# Patient Record
Sex: MALE | Race: WHITE | NOT HISPANIC OR LATINO | Employment: OTHER | URBAN - METROPOLITAN AREA
[De-identification: names, ages, dates, MRNs, and addresses within clinical notes are randomized per-mention and may not be internally consistent; named-entity substitution may affect disease eponyms.]

---

## 2017-04-28 ENCOUNTER — OFFICE VISIT (OUTPATIENT)
Dept: CARDIOLOGY | Facility: CLINIC | Age: 67
End: 2017-04-28

## 2017-04-28 ENCOUNTER — HOSPITAL ENCOUNTER (OUTPATIENT)
Dept: CARDIOLOGY | Facility: HOSPITAL | Age: 67
Discharge: HOME | End: 2017-04-28
Attending: INTERNAL MEDICINE | Admitting: INTERNAL MEDICINE

## 2017-04-28 VITALS
HEIGHT: 74 IN | DIASTOLIC BLOOD PRESSURE: 70 MMHG | BODY MASS INDEX: 24.38 KG/M2 | SYSTOLIC BLOOD PRESSURE: 128 MMHG | WEIGHT: 190 LBS | HEART RATE: 64 BPM

## 2017-04-28 DIAGNOSIS — R09.89 CAROTID BRUIT, UNSPECIFIED LATERALITY: Primary | ICD-10-CM

## 2017-04-28 DIAGNOSIS — E78.2 MIXED HYPERLIPIDEMIA: ICD-10-CM

## 2017-04-28 DIAGNOSIS — I10 ESSENTIAL HYPERTENSION: ICD-10-CM

## 2017-04-28 DIAGNOSIS — E11.65 TYPE 2 DIABETES MELLITUS WITH HYPERGLYCEMIA, WITHOUT LONG-TERM CURRENT USE OF INSULIN (HCC): ICD-10-CM

## 2017-04-28 DIAGNOSIS — I25.10 CORONARY ARTERY DISEASE INVOLVING NATIVE CORONARY ARTERY OF NATIVE HEART WITHOUT ANGINA PECTORIS: ICD-10-CM

## 2017-04-28 LAB
BH CV XLRA MEAS LEFT CCA RATIO VEL: 95.1 CM/SEC
BH CV XLRA MEAS LEFT DIST CCA EDV: 16.5 CM/SEC
BH CV XLRA MEAS LEFT DIST CCA PSV: 95.9 CM/SEC
BH CV XLRA MEAS LEFT DIST ICA EDV: 28.3 CM/SEC
BH CV XLRA MEAS LEFT DIST ICA PSV: 102.9 CM/SEC
BH CV XLRA MEAS LEFT ICA RATIO VEL: 95.1 CM/SEC
BH CV XLRA MEAS LEFT ICA/CCA RATIO: 1
BH CV XLRA MEAS LEFT MID ICA EDV: 27.5 CM/SEC
BH CV XLRA MEAS LEFT MID ICA PSV: 95.9 CM/SEC
BH CV XLRA MEAS LEFT PROX CCA EDV: 17.3 CM/SEC
BH CV XLRA MEAS LEFT PROX CCA PSV: 84.9 CM/SEC
BH CV XLRA MEAS LEFT PROX ECA PSV: 140.6 CM/SEC
BH CV XLRA MEAS LEFT PROX ICA EDV: 23.6 CM/SEC
BH CV XLRA MEAS LEFT PROX ICA PSV: 88 CM/SEC
BH CV XLRA MEAS LEFT PROX SCLA PSV: 108.4 CM/SEC
BH CV XLRA MEAS LEFT VERTEBRAL A EDV: 11.3 CM/SEC
BH CV XLRA MEAS LEFT VERTEBRAL A PSV: 58.9 CM/SEC
BH CV XLRA MEAS RIGHT CCA RATIO VEL: 73.3 CM/SEC
BH CV XLRA MEAS RIGHT DIST CCA EDV: 15.4 CM/SEC
BH CV XLRA MEAS RIGHT DIST CCA PSV: 73.9 CM/SEC
BH CV XLRA MEAS RIGHT DIST ICA EDV: 29.5 CM/SEC
BH CV XLRA MEAS RIGHT DIST ICA PSV: 103.1 CM/SEC
BH CV XLRA MEAS RIGHT ICA RATIO VEL: 104 CM/SEC
BH CV XLRA MEAS RIGHT ICA/CCA RATIO: 1.4
BH CV XLRA MEAS RIGHT MID ICA EDV: 28.3 CM/SEC
BH CV XLRA MEAS RIGHT MID ICA PSV: 105 CM/SEC
BH CV XLRA MEAS RIGHT PROX CCA EDV: 17.1 CM/SEC
BH CV XLRA MEAS RIGHT PROX CCA PSV: 84.4 CM/SEC
BH CV XLRA MEAS RIGHT PROX ECA PSV: 129.2 CM/SEC
BH CV XLRA MEAS RIGHT PROX ICA EDV: 18.9 CM/SEC
BH CV XLRA MEAS RIGHT PROX ICA PSV: 57.8 CM/SEC
BH CV XLRA MEAS RIGHT PROX SCLA PSV: 121 CM/SEC
BH CV XLRA MEAS RIGHT VERTEBRAL A EDV: 12.7 CM/SEC
BH CV XLRA MEAS RIGHT VERTEBRAL A PSV: 41 CM/SEC

## 2017-04-28 PROCEDURE — 93880 EXTRACRANIAL BILAT STUDY: CPT

## 2017-04-28 PROCEDURE — 99214 OFFICE O/P EST MOD 30 MIN: CPT | Performed by: INTERNAL MEDICINE

## 2017-04-28 PROCEDURE — 93880 EXTRACRANIAL BILAT STUDY: CPT | Performed by: INTERNAL MEDICINE

## 2017-04-28 NOTE — PROGRESS NOTES
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    Chief Complaint   Patient presents with   • Coronary Artery Disease   • Right bundle branch block   • Hypertension       PROBLEM LIST:   1.  Coronary artery disease:  a. Surgical revascularization,  Dr. Bryant, 07/01, [facility], receiving LIMA to LAD, free radial graft to PDA, saphenous graft to first diagonal, saphenous graft to obtuse marginal.  b. MPS, May 2008, per [facility]: Normal perfusion, EF 68%.   c. April 2012 - exercise Cardiolite  WNL, EF preserved.  2. Diabetes mellitus, on Metformin  times one year, onset 2009:  March 2015.  HG A1c 8.6.   3.  Dyslipidemia:  a. March 2015:  Total cholesterol 143, triglycerides  326, HDL 39, LDL 38.  4. Hypertension, presumed essential.   5.  Baseline right bundle-branch block.     Allergies  Allergies   Allergen Reactions   • Cardizem [Diltiazem Hcl]    • Celebrex [Celecoxib]        Current Medications    Current Outpatient Prescriptions:   •  aspirin 81 MG tablet, Take 81 mg by mouth Daily., Disp: , Rfl:   •  atorvastatin (LIPITOR) 20 MG tablet, Take 20 mg by mouth Daily., Disp: , Rfl:   •  glipiZIDE (GLUCOTROL) 5 MG tablet, Take 5 mg by mouth Daily., Disp: , Rfl:   •  metFORMIN (GLUCOPHAGE) 1000 MG tablet, Take 1,000 mg by mouth 2 (Two) Times a Day With Meals., Disp: , Rfl:   •  metoprolol succinate XL (TOPROL-XL) 50 MG 24 hr tablet, Take  by mouth Daily., Disp: , Rfl:   •  nitroglycerin (NITROSTAT) 0.4 MG SL tablet, Place 0.4 mg under the tongue Every 5 (Five) Minutes As Needed for chest pain. Take no more than 3 doses in 15 minutes., Disp: , Rfl:       History of Present Illness   Patient presents in routine follow-up. L shoulder and  "arm pain intermittently.  No leg or facial issue.  No chest pain or recurrent leg swelling .  Has not been compliant w diabetic diet after loss of his wife.  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    ROS:  All systems have been reviewed and are negative with the exception of those mentioned in the HPI.    OBJECTIVE:  Vitals:    04/28/17 1531   BP: 128/70   BP Location: Right arm   Patient Position: Sitting   Pulse: 64   Weight: 190 lb (86.2 kg)   Height: 74\" (188 cm)     Physical Exam   Constitutional: He appears well-developed and well-nourished.   Neck: Normal range of motion. Neck supple. No hepatojugular reflux and no JVD present. Carotid bruit is not present. No tracheal deviation present. No thyromegaly present.   Cardiovascular: Normal rate, regular rhythm, S1 normal, S2 normal, intact distal pulses and normal pulses.  PMI is not displaced.  Exam reveals no gallop, no distant heart sounds, no friction rub, no midsystolic click and no opening snap.    No murmur heard.  Pulses:       Carotid pulses are on the right side with bruit, and on the left side with bruit.       Radial pulses are 2+ on the right side, and 2+ on the left side.        Dorsalis pedis pulses are 2+ on the right side, and 2+ on the left side.        Posterior tibial pulses are 2+ on the right side, and 2+ on the left side.   Pulmonary/Chest: Effort normal and breath sounds normal. He has no wheezes. He has no rales.   Abdominal: Soft. Bowel sounds are normal. He exhibits no mass. There is no tenderness. There is no guarding.       Diagnostic Data:  Procedures      ASSESSMENT:   Diagnosis Plan   1. Carotid bruit, unspecified laterality  Duplex Carotid Ultrasound CAR   2. Coronary artery disease involving native coronary artery of native heart without angina pectoris     3. Essential hypertension     4. Mixed hyperlipidemia     5. Type 2 diabetes mellitus with hyperglycemia, without long-term current use of insulin   "       PLAN:  1.  No current anginal equivalent with last stress test 2012.  Continue current medical management and risk reduction.  2. htn not Elevated in office today with reported elevation at his PCPs office.  He does not check his blood pressures at home.  He is obtaining home monitor to see what his pressures at home or.  With his diabetes and coronary artery disease would recommend initiation of ACE inhibitor for cardiorenal protection unless otherwise contraindicated.  Pt thinks victoza is only rx that has helped him historically  But [facility].  3. HL  Reportedly well controlled on current statin therapy.  Recommend continued routine lipid check through his PCP.  4. L arm numbness - carotid bruit, will check cus

## 2017-11-10 ENCOUNTER — OFFICE VISIT (OUTPATIENT)
Dept: CARDIOLOGY | Facility: CLINIC | Age: 67
End: 2017-11-10

## 2017-11-10 VITALS
BODY MASS INDEX: 22.77 KG/M2 | OXYGEN SATURATION: 96 % | DIASTOLIC BLOOD PRESSURE: 60 MMHG | HEIGHT: 74 IN | HEART RATE: 92 BPM | WEIGHT: 177.4 LBS | SYSTOLIC BLOOD PRESSURE: 100 MMHG

## 2017-11-10 DIAGNOSIS — I10 ESSENTIAL HYPERTENSION: ICD-10-CM

## 2017-11-10 DIAGNOSIS — E78.2 MIXED HYPERLIPIDEMIA: ICD-10-CM

## 2017-11-10 DIAGNOSIS — I25.10 CORONARY ARTERY DISEASE INVOLVING NATIVE CORONARY ARTERY OF NATIVE HEART WITHOUT ANGINA PECTORIS: Primary | ICD-10-CM

## 2017-11-10 DIAGNOSIS — E11.9 TYPE 2 DIABETES MELLITUS WITHOUT COMPLICATION, WITHOUT LONG-TERM CURRENT USE OF INSULIN (HCC): ICD-10-CM

## 2017-11-10 PROCEDURE — 99214 OFFICE O/P EST MOD 30 MIN: CPT | Performed by: INTERNAL MEDICINE

## 2017-11-10 NOTE — PROGRESS NOTES
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    Chief Complaint   Patient presents with   • Coronary Artery Disease       PROBLEM LIST:   1.  Coronary artery disease:  a. Surgical revascularization,  Dr. Bryant, 07/01, [facility], receiving LIMA to LAD, free radial graft to PDA, saphenous graft to first diagonal, saphenous graft to obtuse marginal.  b. MPS, May 2008, per [facility]: Normal perfusion, EF 68%.   c. April 2012 - exercise Cardiolite  WNL, EF preserved.  2. Diabetes mellitus, on Metformin  times one year, onset 2009:  March 2015.  HG A1c 8.6.   3.  Dyslipidemia:  a. March 2015:  Total cholesterol 143, triglycerides  326, HDL 39, LDL 38.  4. Hypertension, presumed essential.   5. CVD - 4/17 CUS wnl  6.  Baseline right bundle-branch block.     Allergies  Allergies   Allergen Reactions   • Cardizem [Diltiazem Hcl]    • Celebrex [Celecoxib]        Current Medications    Current Outpatient Prescriptions:   •  aspirin 81 MG tablet, Take 81 mg by mouth Daily., Disp: , Rfl:   •  atorvastatin (LIPITOR) 20 MG tablet, Take 20 mg by mouth Daily., Disp: , Rfl:   •  glipiZIDE (GLUCOTROL) 5 MG tablet, Take 5 mg by mouth Daily., Disp: , Rfl:   •  metFORMIN (GLUCOPHAGE) 1000 MG tablet, Take 1,000 mg by mouth 2 (Two) Times a Day With Meals., Disp: , Rfl:   •  metoprolol succinate XL (TOPROL-XL) 50 MG 24 hr tablet, Take  by mouth 2 (Two) Times a Day., Disp: , Rfl:   •  nitroglycerin (NITROSTAT) 0.4 MG SL tablet, Place 0.4 mg under the tongue Every 5 (Five) Minutes As Needed for chest pain. Take no more than 3 doses in 15 minutes., Disp: , Rfl:       History of Present Illness   Patient presents in routine follow-up. L shoulder and arm pain  "intermittently.  No leg or facial issue.  No chest pain or recurrent leg swelling . Walking regularly up to 3 miles daily with his new female friend.  He states his blood sugars have been much better controlled and he is asymptomatic    ROS:  All systems have been reviewed and are negative with the exception of those mentioned in the HPI.    OBJECTIVE:  Vitals:    11/10/17 1402   BP: 100/60   BP Location: Right arm   Patient Position: Sitting   Pulse: 92   SpO2: 96%   Weight: 177 lb 6.4 oz (80.5 kg)   Height: 74\" (188 cm)     Physical Exam   Constitutional: He appears well-developed and well-nourished.   Neck: Normal range of motion. Neck supple. No hepatojugular reflux and no JVD present. Carotid bruit is not present. No tracheal deviation present. No thyromegaly present.   Cardiovascular: Normal rate, regular rhythm, S1 normal, S2 normal, intact distal pulses and normal pulses.  PMI is not displaced.  Exam reveals no gallop, no distant heart sounds, no friction rub, no midsystolic click and no opening snap.    No murmur heard.  Pulses:       Carotid pulses are on the right side with bruit, and on the left side with bruit.       Radial pulses are 2+ on the right side, and 2+ on the left side.        Dorsalis pedis pulses are 2+ on the right side, and 2+ on the left side.        Posterior tibial pulses are 2+ on the right side, and 2+ on the left side.   Pulmonary/Chest: Effort normal and breath sounds normal. He has no wheezes. He has no rales.   Abdominal: Soft. Bowel sounds are normal. He exhibits no mass. There is no tenderness. There is no guarding.       Diagnostic Data:  Procedures      ASSESSMENT:   Diagnosis Plan   1. Coronary artery disease involving native coronary artery of native heart without angina pectoris     2. Essential hypertension     3. Mixed hyperlipidemia     4. Type 2 diabetes mellitus without complication, without long-term current use of insulin         PLAN:  1.  No current anginal " equivalent with last stress test 2012.  Continue current medical management and risk reduction.  2. htn not Elevated in office today with reported elevation at his PCPs office.  He does not check his blood pressures at home.  He is obtaining home monitor to see what his pressures at home or.  With his diabetes and coronary artery disease would recommend initiation of ACE inhibitor for cardiorenal protection unless otherwise contraindicated.  Pt thinks victoza is only rx that has helped him historically  But [facility].  3. HL  Reportedly well controlled on current statin therapy.  Recommend continued routine lipid check through his PCP.  4. L arm numbness - carotid bruit, will check cus

## 2018-04-10 ENCOUNTER — HOSPITAL ENCOUNTER (OUTPATIENT)
Dept: GENERAL RADIOLOGY | Facility: HOSPITAL | Age: 68
Discharge: HOME | End: 2018-04-10
Admitting: FAMILY MEDICINE

## 2018-04-10 DIAGNOSIS — M25.551 RIGHT HIP PAIN: ICD-10-CM

## 2018-04-10 PROCEDURE — 73502 X-RAY EXAM HIP UNI 2-3 VIEWS: CPT

## 2018-04-10 PROCEDURE — 73502 X-RAY EXAM HIP UNI 2-3 VIEWS: CPT | Performed by: RADIOLOGY

## 2018-08-17 ENCOUNTER — OFFICE VISIT (OUTPATIENT)
Dept: CARDIOLOGY | Facility: CLINIC | Age: 68
End: 2018-08-17

## 2018-08-17 VITALS
DIASTOLIC BLOOD PRESSURE: 62 MMHG | WEIGHT: 185.2 LBS | SYSTOLIC BLOOD PRESSURE: 128 MMHG | HEIGHT: 74 IN | HEART RATE: 55 BPM | BODY MASS INDEX: 23.77 KG/M2

## 2018-08-17 DIAGNOSIS — I10 ESSENTIAL HYPERTENSION: ICD-10-CM

## 2018-08-17 DIAGNOSIS — E78.2 MIXED HYPERLIPIDEMIA: ICD-10-CM

## 2018-08-17 DIAGNOSIS — I25.10 CORONARY ARTERY DISEASE INVOLVING NATIVE CORONARY ARTERY OF NATIVE HEART WITHOUT ANGINA PECTORIS: Primary | ICD-10-CM

## 2018-08-17 PROCEDURE — 93000 ELECTROCARDIOGRAM COMPLETE: CPT | Performed by: INTERNAL MEDICINE

## 2018-08-17 PROCEDURE — 99213 OFFICE O/P EST LOW 20 MIN: CPT | Performed by: INTERNAL MEDICINE

## 2018-08-17 NOTE — PROGRESS NOTES
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    Chief Complaint   Patient presents with   • Coronary Artery Disease       PROBLEM LIST:   1.  Coronary artery disease:  a. Surgical revascularization,  Dr. Bryant, 07/01, [facility], receiving LIMA to LAD, free radial graft to PDA, saphenous graft to first diagonal, saphenous graft to obtuse marginal.  b. MPS, May 2008, per [facility]: Normal perfusion, EF 68%.   c. April 2012 - exercise Cardiolite  WNL, EF preserved.  2. Diabetes mellitus, on Metformin  times one year, onset 2009:  March 2015.  HG A1c 8.6.   3.  Dyslipidemia:  a. March 2015:  Total cholesterol 143, triglycerides  326, HDL 39, LDL 38.  4. Hypertension, presumed essential.   5. CVD - 4/17 CUS wnl  6.  Baseline right bundle-branch block.     Allergies  Allergies   Allergen Reactions   • Cardizem [Diltiazem Hcl]    • Celebrex [Celecoxib]        Current Medications    Current Outpatient Prescriptions:   •  aspirin 81 MG tablet, Take 81 mg by mouth Daily., Disp: , Rfl:   •  atorvastatin (LIPITOR) 20 MG tablet, Take 20 mg by mouth Daily., Disp: , Rfl:   •  glipiZIDE (GLUCOTROL) 5 MG tablet, Take 5 mg by mouth Daily., Disp: , Rfl:   •  metFORMIN (GLUCOPHAGE) 1000 MG tablet, Take 1,000 mg by mouth 2 (Two) Times a Day With Meals., Disp: , Rfl:   •  metoprolol succinate XL (TOPROL-XL) 50 MG 24 hr tablet, Take  by mouth 2 (Two) Times a Day., Disp: , Rfl:   •  nitroglycerin (NITROSTAT) 0.4 MG SL tablet, Place 0.4 mg under the tongue Every 5 (Five) Minutes As Needed for chest pain. Take no more than 3 doses in 15 minutes., Disp: , Rfl:       History of Present Illness   Patient presents in routine follow-up.No chest pain or  "recurrent leg swelling . Walking regularly up to 2 miles daily.  He states his blood sugars have been much better controlled and he is asymptomatic.  Caring for mother w cancer 87 yo may not go to [place] this winter.    ROS:  All systems have been reviewed and are negative with the exception of those mentioned in the HPI.    OBJECTIVE:  Vitals:    08/17/18 1349   BP: 128/62   BP Location: Left arm   Patient Position: Sitting   Pulse: 55   Weight: 84 kg (185 lb 3.2 oz)   Height: 188 cm (74\")     Physical Exam   Constitutional: He appears well-developed and well-nourished.   Neck: Normal range of motion. Neck supple. No hepatojugular reflux and no JVD present. Carotid bruit is not present. No tracheal deviation present. No thyromegaly present.   Cardiovascular: Normal rate, regular rhythm, S1 normal, S2 normal, intact distal pulses and normal pulses.  PMI is not displaced.  Exam reveals no gallop, no distant heart sounds, no friction rub, no midsystolic click and no opening snap.    No murmur heard.  Pulses:       Carotid pulses are on the right side with bruit, and on the left side with bruit.       Radial pulses are 2+ on the right side, and 2+ on the left side.        Dorsalis pedis pulses are 2+ on the right side, and 2+ on the left side.        Posterior tibial pulses are 2+ on the right side, and 2+ on the left side.   Pulmonary/Chest: Effort normal and breath sounds normal. He has no wheezes. He has no rales.   Abdominal: Soft. Bowel sounds are normal. He exhibits no mass. There is no tenderness. There is no guarding.       Diagnostic Data:    ECG 12 Lead  Date/Time: 8/17/2018 1:18 PM  Performed by: SYDNEY LIRIANO  Authorized by: SYDNEY LIRIANO   Comparison: not compared with previous ECG   Previous ECG: no previous ECG available  Rhythm: sinus rhythm  BPM: 86  Conduction: right bundle branch block  Clinical impression: abnormal ECG  Comments: TWA              ASSESSMENT:   Diagnosis Plan   1. Coronary " artery disease involving native coronary artery of native heart without angina pectoris     2. Essential hypertension     3. Mixed hyperlipidemia         PLAN:  1.  No current anginal equivalent with last stress test 2012.  Continue current medical management and risk reduction.  2. htn not Elevated in office today with reported elevation at his PCPs office.  He does not check his blood pressures at home.  He is obtaining home monitor to see what his pressures at home or.  With his diabetes and coronary artery disease would recommend initiation of ACE inhibitor for cardiorenal protection unless otherwise contraindicated.   3. HL  Reportedly well controlled on current statin therapy.  Recommend continued routine lipid check through his PCP.

## 2018-10-01 ENCOUNTER — OFFICE VISIT (OUTPATIENT)
Dept: UROLOGY | Facility: CLINIC | Age: 68
End: 2018-10-01

## 2018-10-01 VITALS — WEIGHT: 185 LBS | HEIGHT: 74 IN | BODY MASS INDEX: 23.74 KG/M2

## 2018-10-01 DIAGNOSIS — N40.1 BPH ASSOCIATED WITH NOCTURIA: ICD-10-CM

## 2018-10-01 DIAGNOSIS — R53.83 FATIGUE, UNSPECIFIED TYPE: Primary | ICD-10-CM

## 2018-10-01 DIAGNOSIS — R35.1 BPH ASSOCIATED WITH NOCTURIA: ICD-10-CM

## 2018-10-01 DIAGNOSIS — N48.6 PEYRONIE DISEASE: ICD-10-CM

## 2018-10-01 DIAGNOSIS — R79.89 LOW TESTOSTERONE IN MALE: ICD-10-CM

## 2018-10-01 LAB — TESTOST SERPL-MCNC: 453.78 NG/DL (ref 86.98–780.1)

## 2018-10-01 PROCEDURE — 36415 COLL VENOUS BLD VENIPUNCTURE: CPT | Performed by: UROLOGY

## 2018-10-01 PROCEDURE — 99204 OFFICE O/P NEW MOD 45 MIN: CPT | Performed by: UROLOGY

## 2018-10-01 PROCEDURE — 84403 ASSAY OF TOTAL TESTOSTERONE: CPT | Performed by: UROLOGY

## 2018-10-01 RX ORDER — TAMSULOSIN HYDROCHLORIDE 0.4 MG/1
1 CAPSULE ORAL NIGHTLY
Qty: 30 CAPSULE | Refills: 3 | Status: ON HOLD | OUTPATIENT
Start: 2018-10-01 | End: 2022-08-26

## 2018-10-01 NOTE — PROGRESS NOTES
Chief Complaint:          Chief Complaint   Patient presents with   • Erectile Dysfunction       HPI:   67 y.o. male.  67-year-old white male referred for evaluation of erectile dysfunction.  He took Viagra get a severe headache.  He didn't think Cialis helped.  He said his PSA is low.  He's never had a testosterone level.  He has significant dribbling after micturition he is a diabetic but is not sure how well it is controlled. he quit tobacco in 2001 when he had his bypass he's got extensive Peyronie's disease on palpation of his penis; place him on alpha blockade and check a testosterone level as well as recheck him back in 4 weeks    Past Medical History:        Past Medical History:   Diagnosis Date   • Coronary artery disease 10/17/2016    Surgical revascularization, Dr. Bryant, 07/01, [facility], receiving LIMA to LAD, free radial graft to PDA, saphenous graft to first diagonal, saphenous graft to obtuse marginal. MPS, May 2008, per [facility]: Normal perfusion, EF 68%.  April 2012 - exercise Cardiolite WNL, EF preserved.   • Diabetes mellitus (CMS/HCC) 10/17/2016    on Metformin times one year, onset 2009:  March 2015.  HG A1c 8.6.    • Dyslipidemia 10/17/2016    March 2015:  Total cholesterol 143, triglycerides 326, HDL 39, LDL 38.   • Hypertension 10/17/2016    presumed essential.    • Right bundle branch block 10/17/2016    baseline         Current Meds:     Current Outpatient Prescriptions   Medication Sig Dispense Refill   • aspirin 81 MG tablet Take 81 mg by mouth Daily.     • atorvastatin (LIPITOR) 20 MG tablet Take 20 mg by mouth Daily.     • glipiZIDE (GLUCOTROL) 5 MG tablet Take 5 mg by mouth Daily.     • metFORMIN (GLUCOPHAGE) 1000 MG tablet Take 1,000 mg by mouth 2 (Two) Times a Day With Meals.     • metoprolol succinate XL (TOPROL-XL) 50 MG 24 hr tablet Take  by mouth 2 (Two) Times a Day.     • nitroglycerin (NITROSTAT) 0.4 MG SL tablet Place 0.4 mg under the tongue Every 5 (Five) Minutes As Needed for  chest pain. Take no more than 3 doses in 15 minutes.       No current facility-administered medications for this visit.         Allergies:      Allergies   Allergen Reactions   • Cardizem [Diltiazem Hcl]    • Celebrex [Celecoxib]         Past Surgical History:     No past surgical history on file.      Social History:     Social History     Social History   • Marital status:      Spouse name: N/A   • Number of children: N/A   • Years of education: N/A     Occupational History   • Not on file.     Social History Main Topics   • Smoking status: Former Smoker     Types: Cigarettes     Quit date: 10/28/2001   • Smokeless tobacco: Never Used   • Alcohol use No   • Drug use: No   • Sexual activity: Defer     Other Topics Concern   • Not on file     Social History Narrative   • No narrative on file       Family History:     Family History   Problem Relation Age of Onset   • Ovarian cancer Mother    • Hypertension Father    • Stroke Father        Review of Systems:     Review of Systems   Constitutional: Negative.    HENT: Negative.    Eyes: Negative.    Respiratory: Negative.    Cardiovascular: Negative.    Gastrointestinal: Negative.    Endocrine: Negative.    Genitourinary: Positive for penile pain.   Musculoskeletal: Negative.    Allergic/Immunologic: Negative.    Neurological: Negative.    Hematological: Negative.    Psychiatric/Behavioral: Negative.        Physical Exam:     Physical Exam   Constitutional: He is oriented to person, place, and time. He appears well-developed and well-nourished.   HENT:   Head: Normocephalic and atraumatic.   Eyes: Pupils are equal, round, and reactive to light. Conjunctivae and EOM are normal.   Neck: Normal range of motion.   Cardiovascular: Normal rate, regular rhythm, normal heart sounds and intact distal pulses.    Pulmonary/Chest: Effort normal and breath sounds normal.   Abdominal: Soft. Bowel sounds are normal.   Genitourinary:   Genitourinary Comments: Soft nontender  abdomen with no organomegaly, rigidity, or tenderness.  He has normal external genitalia and uncircumcised phallus with a freely movable foreskin.  He has extensive plaque in the shaft. bilaterally descended testes without masses there is no inguinal hernias adenopathy or abnormalities he had good rectal tone and a large smooth firm prostate.  There is no nodularity or any suspicious rectal abnormalities     Musculoskeletal: Normal range of motion.   Neurological: He is alert and oriented to person, place, and time. He has normal reflexes.   Skin: Skin is warm and dry.   Psychiatric: He has a normal mood and affect. His behavior is normal. Judgment and thought content normal.   Nursing note and vitals reviewed.      I have reviewed the following portions of the patient's history: allergies, current medications, past family history, past medical history, past social history, past surgical history, problem list and ROS and confirm it's accurate.      Procedure:       Assessment/Plan:   Peyronie's Disease-we discussed the pathophysiology of this at length.  I went ahead and anand a diagram showing the disease status and how it has about an 18% association with a congenital condition called Dupuytren's contracture which is very much an inherited disease but is seen in men at the average age in the 50s and is associated many times with diabetes.  However the vast majority of this disease is related to penile trauma especially in the mid 50s when testosterone levels are dropping and a rockhard erection is not an option causing bending at the flexion point of the penis.  We discussed treatment options including the use of xiaflex, which is clostridial collagenase causing a dissolution of the plaque.  This is especially useful single bend however we also discussed penile traction devices vacuum pumps and the use of low-dose PDE-5 inhibitors have been effective. We also discussed the significant foreshortening of the penis  which is a hallmark of the disease  This is a very difficult situation for him and we will initiate aggressive therapy as his wife is very interested in resuming sexual intercourse.  He also discussed the various antiquated therapies including the use of colchicine,PABA, verapamil, and vitamin E therapy.  Low TestosteroneThis pleasant male patient presents today with signs and symptoms that are consistent with low testosterone he has positive Ramon questionnaire by history this includes both the sexual and nonsexual side effects.  Sexual side effects include inability to achieve and maintain an erection, in ability to maintain his erection and decreased interest and sexual activity.  Nonsexual symptomatology includes fatigue, difficulty completing a job, tiredness.  He has a discussion of the various forms testosterone available including parenteral, topical, and the form of a patch.  We discussed the efficacy of the gels, and the injections.  As well as the cost and benefits analysis.  We discussed the the studies a talked about heart disease and its effect on prostate cancer both of which are negligible.  He gives verbal consent to proceed with treatment.  He understands the risks and benefits of length he also completed his attempts at fertility he understands the partial effect on spermatogenesis     Patient's Body mass index is 23.74 kg/m². BMI is within normal parameters. No follow-up required.          This document has been electronically signed by LIAN SHULTZ MD October 1, 2018 8:55 AM

## 2018-10-17 ENCOUNTER — TELEPHONE (OUTPATIENT)
Dept: UROLOGY | Facility: CLINIC | Age: 68
End: 2018-10-17

## 2018-10-17 NOTE — TELEPHONE ENCOUNTER
I called Dr. Estrada's office per Dr. Espinoza's request to try and get a copy of the patient's PSA results.  I left a message and asked them to please fax it over or call us and left both numbers.

## 2018-11-13 ENCOUNTER — OFFICE VISIT (OUTPATIENT)
Dept: UROLOGY | Facility: CLINIC | Age: 68
End: 2018-11-13

## 2018-11-13 VITALS — BODY MASS INDEX: 23.74 KG/M2 | WEIGHT: 185 LBS | HEIGHT: 74 IN

## 2018-11-13 DIAGNOSIS — N48.6 PEYRONIE DISEASE: Primary | ICD-10-CM

## 2018-11-13 PROBLEM — R79.89 LOW TESTOSTERONE IN MALE: Status: RESOLVED | Noted: 2018-10-01 | Resolved: 2018-11-13

## 2018-11-13 PROCEDURE — 99214 OFFICE O/P EST MOD 30 MIN: CPT | Performed by: UROLOGY

## 2018-11-13 NOTE — PROGRESS NOTES
Chief Complaint:          Chief Complaint   Patient presents with   • Fatigue       HPI:   68 y.o. male.  67-year-old white male referred for evaluation of erectile dysfunction.  He took Viagra get a severe headache.  He didn't think Cialis helped.  He said his PSA is low.  He's never had a testosterone level.  He has significant dribbling after micturition he is a diabetic but is not sure how well it is controlled. he quit tobacco in 2001 when he had his bypass he's got extensive Peyronie's disease on palpation of his penis; place him on alpha blockade and check a testosterone level as well as recheck him back in 4 weeks.  He returns today to review his labs.  His testosterone levels 453.78 which is well within the normal range and is indicative of the fact he will get no response from exogenous testosterone.  With regards to his Peyronie's Disease.  He is unable to penetrate because of distal softening.  Because of this, I don't believe is a candidate for Xiaflex we discussed other options including penile injections versus a penile implant.  He wants to talk it over with his second wife.  I indicated if he has a very active sex life this would be worth it but if it's a situation where she has no interest and would probably be a waste of time and very futile and frustrating for him.  He's going to discuss his situation with his wife and get back with me in this regard.    Past Medical History:        Past Medical History:   Diagnosis Date   • Coronary artery disease 10/17/2016    Surgical revascularization, Dr. Bryant, 07/01, [facility], receiving LIMA to LAD, free radial graft to PDA, saphenous graft to first diagonal, saphenous graft to obtuse marginal. MPS, May 2008, per [facility]: Normal perfusion, EF 68%.  April 2012 - exercise Cardiolite WNL, EF preserved.   • Diabetes mellitus (CMS/[facility]) 10/17/2016    on Metformin times one year, onset 2009:  March 2015.  HG A1c 8.6.    • Dyslipidemia 10/17/2016    March 2015:   Total cholesterol 143, triglycerides 326, HDL 39, LDL 38.   • Hypertension 10/17/2016    presumed essential.    • Right bundle branch block 10/17/2016    baseline         Current Meds:     Current Outpatient Medications   Medication Sig Dispense Refill   • aspirin 81 MG tablet Take 81 mg by mouth Daily.     • atorvastatin (LIPITOR) 20 MG tablet Take 20 mg by mouth Daily.     • glipiZIDE (GLUCOTROL) 5 MG tablet Take 5 mg by mouth Daily.     • metFORMIN (GLUCOPHAGE) 1000 MG tablet Take 1,000 mg by mouth 2 (Two) Times a Day With Meals.     • metoprolol succinate XL (TOPROL-XL) 50 MG 24 hr tablet Take  by mouth 2 (Two) Times a Day.     • nitroglycerin (NITROSTAT) 0.4 MG SL tablet Place 0.4 mg under the tongue Every 5 (Five) Minutes As Needed for chest pain. Take no more than 3 doses in 15 minutes.     • tamsulosin (FLOMAX) 0.4 MG capsule 24 hr capsule Take 1 capsule by mouth Every Night. 30 capsule 3     No current facility-administered medications for this visit.         Allergies:      Allergies   Allergen Reactions   • Cardizem [Diltiazem Hcl]    • Celebrex [Celecoxib]         Past Surgical History:     No past surgical history on file.      Social History:     Social History     Socioeconomic History   • Marital status:      Spouse name: Not on file   • Number of children: Not on file   • Years of education: Not on file   • Highest education level: Not on file   Social Needs   • Financial resource strain: Not on file   • Food insecurity - worry: Not on file   • Food insecurity - inability: Not on file   • Transportation needs - medical: Not on file   • Transportation needs - non-medical: Not on file   Occupational History   • Not on file   Tobacco Use   • Smoking status: Former Smoker     Types: Cigarettes     Last attempt to quit: 10/28/2001     Years since quittin.0   • Smokeless tobacco: Never Used   Substance and Sexual Activity   • Alcohol use: No   • Drug use: No   • Sexual activity: Defer   Other  Topics Concern   • Not on file   Social History Narrative   • Not on file       Family History:     Family History   Problem Relation Age of Onset   • Ovarian cancer Mother    • Hypertension Father    • Stroke Father        Review of Systems:     Review of Systems   Constitutional: Negative.    HENT: Negative.    Eyes: Negative.    Respiratory: Negative.    Cardiovascular: Negative.    Gastrointestinal: Negative.    Endocrine: Negative.    Musculoskeletal: Negative.    Allergic/Immunologic: Negative.    Neurological: Negative.    Hematological: Negative.    Psychiatric/Behavioral: Negative.        Physical Exam:     Physical Exam   Constitutional: He is oriented to person, place, and time. He appears well-developed and well-nourished.   HENT:   Head: Normocephalic and atraumatic.   Eyes: Conjunctivae and EOM are normal. Pupils are equal, round, and reactive to light.   Neck: Normal range of motion.   Cardiovascular: Normal rate, regular rhythm, normal heart sounds and intact distal pulses.   Pulmonary/Chest: Effort normal and breath sounds normal.   Abdominal: Soft. Bowel sounds are normal.   Musculoskeletal: Normal range of motion.   Neurological: He is alert and oriented to person, place, and time. He has normal reflexes.   Skin: Skin is warm and dry.   Psychiatric: He has a normal mood and affect. His behavior is normal. Judgment and thought content normal.   Nursing note and vitals reviewed.      I have reviewed the following portions of the patient's history: allergies, current medications, past family history, past medical history, past social history, past surgical history, problem list and ROS and confirm it's accurate.      Procedure:       Assessment/Plan:   Peyronie's Disease-we discussed the pathophysiology of this at length.  I went ahead and anand a diagram showing the disease status and how it has about an 18% association with a congenital condition called Dupuytren's contracture which is very much an  inherited disease but is seen in men at the average age in the 50s and is associated many times with diabetes.  However the vast majority of this disease is related to penile trauma especially in the mid 50s when testosterone levels are dropping and a rockhard erection is not an option causing bending at the flexion point of the penis.  We discussed treatment options including the use of xiaflex, which is clostridial collagenase causing a dissolution of the plaque.  This is especially useful single bend however we also discussed penile traction devices vacuum pumps and the use of low-dose PDE-5 inhibitors have been effective. We also discussed the significant foreshortening of the penis which is a hallmark of the disease  This is a very difficult situation for him and we will initiate aggressive therapy as his wife is very interested in resuming sexual intercourse.  He also discussed the various antiquated therapies including the use of colchicine,PABA, verapamil, and vitamin E therapy.  He has enough distal softening and inability to penetrate I think his next step would be to be penile injections which will be probably only marginal versus a penile implant he's to think about this and get back with me in this regard     Patient's Body mass index is 23.74 kg/m². BMI is within normal parameters. No follow-up required.          This document has been electronically signed by LIAN SHULTZ MD November 13, 2018 9:23 AM

## 2019-07-18 ENCOUNTER — TELEPHONE (OUTPATIENT)
Dept: CARDIOLOGY | Facility: CLINIC | Age: 69
End: 2019-07-18

## 2019-07-18 DIAGNOSIS — R07.9 CHEST PAIN, UNSPECIFIED TYPE: Primary | ICD-10-CM

## 2019-07-19 ENCOUNTER — TELEPHONE (OUTPATIENT)
Dept: CARDIOLOGY | Facility: CLINIC | Age: 69
End: 2019-07-19

## 2019-07-19 ENCOUNTER — OFFICE VISIT (OUTPATIENT)
Dept: CARDIOLOGY | Facility: CLINIC | Age: 69
End: 2019-07-19

## 2019-07-19 VITALS
HEART RATE: 77 BPM | OXYGEN SATURATION: 97 % | HEIGHT: 74 IN | BODY MASS INDEX: 22.3 KG/M2 | SYSTOLIC BLOOD PRESSURE: 122 MMHG | WEIGHT: 173.8 LBS | DIASTOLIC BLOOD PRESSURE: 66 MMHG

## 2019-07-19 DIAGNOSIS — I10 ESSENTIAL HYPERTENSION: ICD-10-CM

## 2019-07-19 DIAGNOSIS — R06.02 SHORTNESS OF BREATH: ICD-10-CM

## 2019-07-19 DIAGNOSIS — E78.5 DYSLIPIDEMIA: ICD-10-CM

## 2019-07-19 DIAGNOSIS — R49.0 HOARSENESS: Primary | ICD-10-CM

## 2019-07-19 DIAGNOSIS — I25.10 CORONARY ARTERY DISEASE INVOLVING NATIVE CORONARY ARTERY OF NATIVE HEART WITHOUT ANGINA PECTORIS: ICD-10-CM

## 2019-07-19 DIAGNOSIS — N52.9 IMPOTENCE: ICD-10-CM

## 2019-07-19 DIAGNOSIS — E11.9 TYPE 2 DIABETES MELLITUS WITHOUT COMPLICATION, WITHOUT LONG-TERM CURRENT USE OF INSULIN (HCC): ICD-10-CM

## 2019-07-19 PROCEDURE — 93000 ELECTROCARDIOGRAM COMPLETE: CPT | Performed by: INTERNAL MEDICINE

## 2019-07-19 PROCEDURE — 99214 OFFICE O/P EST MOD 30 MIN: CPT | Performed by: INTERNAL MEDICINE

## 2019-07-19 NOTE — TELEPHONE ENCOUNTER
ent referral made for thurs July 25. Pt will go to [address] [facility] to [place] [facility].     Depending on what this shows he will go for a gi referral.     Pt is set up with dr espinal for urology in [place] already a pt there. He will call and make an appt.

## 2019-07-19 NOTE — PROGRESS NOTES
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, from [place].    Chief Complaint   Patient presents with   • Coronary Artery Disease   • Shortness of Breath   • Cough       PROBLEM LIST:   1.  Coronary artery disease:  a. Surgical revascularization,  Dr. Bryant, 07/01, [facility], receiving LIMA to LAD, free radial graft to PDA, saphenous graft to first diagonal, saphenous graft to obtuse marginal.  b. MPS, May 2008, per [facility]: Normal perfusion, EF 68%.   c. April 2012 - exercise Cardiolite  WNL, EF preserved.  2. Diabetes mellitus, on Metformin  times one year, onset 2009:  March 2015.  HG A1c 8.6.   3.  Dyslipidemia:  a. March 2015:  Total cholesterol 143, triglycerides  326, HDL 39, LDL 38.  4. Hypertension, presumed essential.   5. CVD - 4/17 CUS wnl  6.  Baseline right bundle-branch block.     Allergies  Allergies   Allergen Reactions   • Cardizem [Diltiazem Hcl]    • Celebrex [Celecoxib]        Current Medications    Current Outpatient Medications:   •  aspirin 81 MG tablet, Take 81 mg by mouth Daily., Disp: , Rfl:   •  atorvastatin (LIPITOR) 20 MG tablet, Take 20 mg by mouth Daily., Disp: , Rfl:   •  glipiZIDE (GLUCOTROL) 5 MG tablet, Take 5 mg by mouth Daily., Disp: , Rfl:   •  metFORMIN (GLUCOPHAGE) 1000 MG tablet, Take 1,000 mg by mouth 2 (Two) Times a Day With Meals., Disp: , Rfl:   •  metoprolol succinate XL (TOPROL-XL) 50 MG 24 hr tablet, Take  by mouth 2 (Two) Times a Day., Disp: , Rfl:   •  nitroglycerin (NITROSTAT) 0.4 MG SL tablet, Place 0.4 mg under the tongue Every 5 (Five) Minutes As Needed for chest pain. Take no more than 3 doses in 15 minutes., Disp: , Rfl:   •  tamsulosin (FLOMAX) 0.4 MG capsule 24 hr capsule, Take 1  "capsule by mouth Every Night., Disp: 30 capsule, Rfl: 3      History of Present Illness   Patient presents in follow-up. Reports acid reflux and esophageal spasm pain as well. Has been horse and having SOB. No chest tightness.     He states his blood sugars have been much better controlled and he is asymptomatic.  BP is acceptable. Is on statin.     Is down 12 lbs from last visit unintentionally.         OBJECTIVE:  Vitals:    07/19/19 1353   BP: 122/66   BP Location: Left arm   Patient Position: Sitting   Pulse: 77   SpO2: 97%   Weight: 78.8 kg (173 lb 12.8 oz)   Height: 188 cm (74\")     Physical Exam   Constitutional: He appears well-developed and well-nourished.   Neck: Normal range of motion. Neck supple. No hepatojugular reflux and no JVD present. Carotid bruit is not present. No tracheal deviation present. No thyromegaly present.   Cardiovascular: Normal rate, regular rhythm, S1 normal, S2 normal, intact distal pulses and normal pulses. PMI is not displaced. Exam reveals no gallop, no distant heart sounds, no friction rub, no midsystolic click and no opening snap.   No murmur heard.  Pulses:       Carotid pulses are on the right side with bruit, and on the left side with bruit.       Radial pulses are 2+ on the right side, and 2+ on the left side.        Dorsalis pedis pulses are 2+ on the right side, and 2+ on the left side.        Posterior tibial pulses are 2+ on the right side, and 2+ on the left side.   Pulmonary/Chest: Effort normal and breath sounds normal. He has no wheezes. He has no rales.   Abdominal: Soft. Bowel sounds are normal. He exhibits no mass. There is no tenderness. There is no guarding.       Diagnostic Data:    ECG 12 Lead  Date/Time: 7/19/2019 9:24 AM  Performed by: Dinoicio Trevino MD  Authorized by: Dionicio Trevino MD   Comparison: compared with previous ECG from 8/17/2018  Similar to previous ECG  Rhythm: sinus rhythm  Rate: normal  BPM: 84  Conduction: right bundle branch " block  QRS axis: indeterminate  Other findings: left ventricular hypertrophy  Comments: Inferior infarct,               ASSESSMENT:   Diagnosis Plan   1. Coronary artery disease involving native coronary artery of native heart without angina pectoris     2. Essential hypertension     3. Dyslipidemia     4. Type 2 diabetes mellitus without complication, without long-term current use of insulin (CMS/[facility])         PLAN:  1. Pt w stress test planned next week for eval of anginal equivalent dyspnea. Cont medical management otherwise.  2. BP acceptable  3. Cont statin therapy  4. Counseled that cardiac risk w dm increases w a1c >7  5. With SOB and hoarseness, if stress test is acceptable would be concerned re upper airway issue. Will refer to ENT. Dr. Dhillon goes to [place].   6. Will also refer to urology per pt request to [facility]    Scribed for Dionicio Trevino MD by Audrey Crow PA-C. 7/19/2019  2:14 PM   IDionicio MD, personally performed the services described in this documentation as scribed by the above named individual in my presence, and it is both accurate and complete.  7/19/2019  2:27 PM

## 2019-07-23 ENCOUNTER — HOSPITAL ENCOUNTER (OUTPATIENT)
Dept: CARDIOLOGY | Facility: HOSPITAL | Age: 69
Discharge: HOME | End: 2019-07-23

## 2019-07-23 DIAGNOSIS — R07.9 CHEST PAIN, UNSPECIFIED TYPE: ICD-10-CM

## 2019-07-23 PROCEDURE — 25010000002 REGADENOSON 0.4 MG/5ML SOLUTION: Performed by: INTERNAL MEDICINE

## 2019-07-23 PROCEDURE — 0 TECHNETIUM SESTAMIBI: Performed by: INTERNAL MEDICINE

## 2019-07-23 PROCEDURE — 78452 HT MUSCLE IMAGE SPECT MULT: CPT | Performed by: INTERNAL MEDICINE

## 2019-07-23 PROCEDURE — A9500 TC99M SESTAMIBI: HCPCS | Performed by: INTERNAL MEDICINE

## 2019-07-23 PROCEDURE — 93018 CV STRESS TEST I&R ONLY: CPT | Performed by: INTERNAL MEDICINE

## 2019-07-23 PROCEDURE — 93017 CV STRESS TEST TRACING ONLY: CPT

## 2019-07-23 PROCEDURE — 78452 HT MUSCLE IMAGE SPECT MULT: CPT

## 2019-07-23 RX ADMIN — REGADENOSON 0.4 MG: 0.08 INJECTION, SOLUTION INTRAVENOUS at 13:12

## 2019-07-23 RX ADMIN — TECHNETIUM TC 99M SESTAMIBI 1 DOSE: 1 INJECTION INTRAVENOUS at 11:45

## 2019-07-23 RX ADMIN — TECHNETIUM TC 99M SESTAMIBI 1 DOSE: 1 INJECTION INTRAVENOUS at 13:15

## 2019-07-24 LAB
BH CV STRESS BP STAGE 2: NORMAL
BH CV STRESS COMMENTS STAGE 1: NORMAL
BH CV STRESS DOSE REGADENOSON STAGE 1: 0.4
BH CV STRESS DURATION MIN STAGE 1: 1
BH CV STRESS DURATION MIN STAGE 2: 1
BH CV STRESS DURATION MIN STAGE 3: 1
BH CV STRESS DURATION MIN STAGE 4: 1
BH CV STRESS DURATION SEC STAGE 1: 10
BH CV STRESS DURATION SEC STAGE 2: 0
BH CV STRESS HR STAGE 1: 72
BH CV STRESS HR STAGE 2: 78
BH CV STRESS HR STAGE 3: 73
BH CV STRESS HR STAGE 4: 64
BH CV STRESS PROTOCOL 1: NORMAL
BH CV STRESS RECOVERY BP: NORMAL MMHG
BH CV STRESS RECOVERY HR: 70 BPM
BH CV STRESS STAGE 1: 1
BH CV STRESS STAGE 2: 2
BH CV STRESS STAGE 3: 3
BH CV STRESS STAGE 4: 4
LV EF NUC BP: 70 %
MAXIMAL PREDICTED HEART RATE: 151 BPM
PERCENT MAX PREDICTED HR: 52.98 %
STRESS BASELINE BP: NORMAL MMHG
STRESS BASELINE HR: 48 BPM
STRESS PERCENT HR: 62 %
STRESS POST PEAK BP: NORMAL MMHG
STRESS POST PEAK HR: 80 BPM
STRESS TARGET HR: 128 BPM

## 2019-07-30 ENCOUNTER — TELEPHONE (OUTPATIENT)
Dept: CARDIOLOGY | Facility: CLINIC | Age: 69
End: 2019-07-30

## 2020-07-24 ENCOUNTER — OFFICE VISIT (OUTPATIENT)
Dept: CARDIOLOGY | Facility: CLINIC | Age: 70
End: 2020-07-24

## 2020-07-24 VITALS
HEIGHT: 74 IN | WEIGHT: 179.4 LBS | BODY MASS INDEX: 23.02 KG/M2 | DIASTOLIC BLOOD PRESSURE: 70 MMHG | SYSTOLIC BLOOD PRESSURE: 120 MMHG

## 2020-07-24 DIAGNOSIS — E11.9 TYPE 2 DIABETES MELLITUS WITHOUT COMPLICATION, WITHOUT LONG-TERM CURRENT USE OF INSULIN (HCC): ICD-10-CM

## 2020-07-24 DIAGNOSIS — I25.10 CORONARY ARTERY DISEASE INVOLVING NATIVE CORONARY ARTERY OF NATIVE HEART WITHOUT ANGINA PECTORIS: Primary | ICD-10-CM

## 2020-07-24 DIAGNOSIS — I10 ESSENTIAL HYPERTENSION: ICD-10-CM

## 2020-07-24 DIAGNOSIS — E78.2 MIXED HYPERLIPIDEMIA: ICD-10-CM

## 2020-07-24 PROCEDURE — 99214 OFFICE O/P EST MOD 30 MIN: CPT | Performed by: INTERNAL MEDICINE

## 2020-07-24 NOTE — PROGRESS NOTES
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    PROBLEM LIST:   1.  Coronary artery disease:  a. Surgical revascularization,  Dr. Bryant, 07/01, [facility], receiving LIMA to LAD, free radial graft to PDA, saphenous graft to first diagonal, saphenous graft to obtuse marginal.  b. MPS, May 2008, per [facility]: Normal perfusion, EF 68%.   c. April 2012 - exercise Cardiolite  WNL, EF preserved.  d. 7/24/19 MPS exercise cardiolite wnl  2. Diabetes mellitus, on Metformin  times one year, onset 2009:  March 2015.  HG A1c 8.6.   3.  Dyslipidemia:  a. March 2015:  Total cholesterol 143, triglycerides  326, HDL 39, LDL 38.  4. Hypertension, presumed essential.   5. CVD - 4/17 CUS wnl  6.  Baseline right bundle-branch block.       Chief Complaint   Patient presents with   • Coronary artery disease involving native coronary artery of        Allergies  Allergies   Allergen Reactions   • Cardizem [Diltiazem Hcl]    • Celebrex [Celecoxib]        Current Medications    Current Outpatient Medications:   •  aspirin 81 MG tablet, Take 81 mg by mouth Daily., Disp: , Rfl:   •  atorvastatin (LIPITOR) 20 MG tablet, Take 20 mg by mouth Daily., Disp: , Rfl:   •  glipiZIDE (GLUCOTROL) 5 MG tablet, Take 5 mg by mouth Daily., Disp: , Rfl:   •  metFORMIN (GLUCOPHAGE) 1000 MG tablet, Take 1,000 mg by mouth 2 (Two) Times a Day With Meals., Disp: , Rfl:   •  metoprolol succinate XL (TOPROL-XL) 50 MG 24 hr tablet, Take  by mouth 2 (Two) Times a Day., Disp: , Rfl:   •  nitroglycerin (NITROSTAT) 0.4 MG SL tablet, Place 0.4 mg under the tongue Every 5 (Five) Minutes As Needed for chest pain. Take no more than 3 doses in 15 minutes., Disp: ,  "Rfl:   •  NON FORMULARY, New medicine for prostate pt states does not know name, Disp: , Rfl:   •  omalizumab (XOLAIR) 150 MG injection, Inject  under the skin into the appropriate area as directed 1 (One) Time., Disp: , Rfl:   •  tamsulosin (FLOMAX) 0.4 MG capsule 24 hr capsule, Take 1 capsule by mouth Every Night., Disp: 30 capsule, Rfl: 3      History of Present Illness   Giorgio Omer is a 69 y.o. year old male here for follow up. Pt reports more fatigue. Stress last summer was low risk. Reports pain in R hip, has seen orthopedics, not considering surgery as of now.     Pt denies any chest pain, dyspnea, dyspnea on exertion, orthopnea, PND, palpitations, lower extremity edema, or claudication. Reports a1c was recently low 7s. Is on statin. BP is controlled.       OBJECTIVE:  Vitals:    07/24/20 1306   BP: 120/70   BP Location: Right arm   Patient Position: Sitting   Weight: 81.4 kg (179 lb 6.4 oz)   Height: 188 cm (74\")     Physical Exam   Constitutional: He appears well-developed and well-nourished.   Neck: Normal range of motion. Neck supple. No hepatojugular reflux and no JVD present. Carotid bruit is not present. No tracheal deviation present. No thyromegaly present.   Cardiovascular: Normal rate, regular rhythm, S1 normal, S2 normal, intact distal pulses and normal pulses. PMI is not displaced. Exam reveals no gallop, no distant heart sounds, no friction rub, no midsystolic click and no opening snap.   No murmur heard.  Pulses:       Carotid pulses are on the right side with bruit, and on the left side with bruit.       Radial pulses are 2+ on the right side, and 2+ on the left side.        Dorsalis pedis pulses are 2+ on the right side, and 2+ on the left side.        Posterior tibial pulses are 2+ on the right side, and 2+ on the left side.   Pulmonary/Chest: Effort normal and breath sounds normal. He has no wheezes. He has no rales.   Abdominal: Soft. Bowel sounds are normal. He exhibits no mass. There " is no tenderness. There is no guarding.       Diagnostic Data:  Procedures      ASSESSMENT:   Diagnosis Plan   1. Coronary artery disease involving native coronary artery of native heart without angina pectoris     2. Essential hypertension     3. Type 2 diabetes mellitus without complication, without long-term current use of insulin (CMS/[facility])     4. Mixed hyperlipidemia         PLAN:  1. Patient is stable with no anginal equivalent symptoms. Continue medical management. Acceptable stress last summer  2. Patient has acceptable BP. Continue current medical management. Counseled to regularly check BP at home with goal averaging <130/80.   3. Patient counseled that cardiac risk with diabetes increases with hemaglobin a1c >7. Counseled to avoid starch rich foods such as bread, pasta, potatoes, and sweets, and to avoid drinking sugary beverages.    4. Continue statin therapy.     Lurdes Estrada MD, thank you for referring Mr. Omer for evaluation.  I have forwarded my electronically generated recommendations to you for review.  Please do not hesitate to call with any questions.    Scribed for Dionicio Trevino MD by Audrey Crow PA-C. 7/24/2020  13:57   Dionicio Trevino MD, [facility]

## 2020-08-07 ENCOUNTER — TELEPHONE (OUTPATIENT)
Dept: CARDIOLOGY | Facility: CLINIC | Age: 70
End: 2020-08-07

## 2020-08-07 NOTE — TELEPHONE ENCOUNTER
Surgical/Cardiac Clearance needed:    Procedure: Hernia Repair    Provider: Dr. Marr    Date: unknown      Contact Info: [place]  -Phone: [phone]  -Fax: [phone]    Please Advise

## 2020-08-20 ENCOUNTER — TRANSCRIBE ORDERS (OUTPATIENT)
Dept: ADMINISTRATIVE | Facility: HOSPITAL | Age: 70
End: 2020-08-20

## 2020-08-20 DIAGNOSIS — Z01.818 OTHER SPECIFIED PRE-OPERATIVE EXAMINATION: Primary | ICD-10-CM

## 2020-08-24 ENCOUNTER — LAB (OUTPATIENT)
Dept: LAB | Facility: HOSPITAL | Age: 70
End: 2020-08-24

## 2020-08-24 DIAGNOSIS — Z01.818 OTHER SPECIFIED PRE-OPERATIVE EXAMINATION: ICD-10-CM

## 2020-08-24 LAB
REF LAB TEST METHOD: NORMAL
SARS-COV-2 RNA RESP QL NAA+PROBE: NOT DETECTED

## 2020-08-24 PROCEDURE — U0004 COV-19 TEST NON-CDC HGH THRU: HCPCS

## 2020-08-24 PROCEDURE — U0002 COVID-19 LAB TEST NON-CDC: HCPCS

## 2020-08-24 PROCEDURE — C9803 HOPD COVID-19 SPEC COLLECT: HCPCS

## 2021-02-17 DIAGNOSIS — Z23 IMMUNIZATION DUE: ICD-10-CM

## 2021-08-06 ENCOUNTER — OFFICE VISIT (OUTPATIENT)
Dept: CARDIOLOGY | Facility: CLINIC | Age: 71
End: 2021-08-06

## 2021-08-06 VITALS
HEART RATE: 56 BPM | SYSTOLIC BLOOD PRESSURE: 130 MMHG | DIASTOLIC BLOOD PRESSURE: 56 MMHG | WEIGHT: 176 LBS | BODY MASS INDEX: 22.59 KG/M2 | OXYGEN SATURATION: 98 % | HEIGHT: 74 IN

## 2021-08-06 DIAGNOSIS — I25.10 CORONARY ARTERY DISEASE INVOLVING NATIVE CORONARY ARTERY OF NATIVE HEART WITHOUT ANGINA PECTORIS: ICD-10-CM

## 2021-08-06 DIAGNOSIS — E08.00 DIABETES MELLITUS DUE TO UNDERLYING CONDITION WITH HYPEROSMOLARITY WITHOUT COMA, WITHOUT LONG-TERM CURRENT USE OF INSULIN (HCC): ICD-10-CM

## 2021-08-06 DIAGNOSIS — E78.5 DYSLIPIDEMIA: ICD-10-CM

## 2021-08-06 DIAGNOSIS — I10 ESSENTIAL HYPERTENSION: ICD-10-CM

## 2021-08-06 DIAGNOSIS — M25.551 RIGHT HIP PAIN: Primary | ICD-10-CM

## 2021-08-06 DIAGNOSIS — I45.10 RIGHT BUNDLE BRANCH BLOCK: ICD-10-CM

## 2021-08-06 PROCEDURE — 99214 OFFICE O/P EST MOD 30 MIN: CPT | Performed by: INTERNAL MEDICINE

## 2021-08-06 NOTE — PROGRESS NOTES
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, from [place].    PROBLEM LIST:   1.  Coronary artery disease:  a. Surgical revascularization,  Dr. Bryant, 07/01, [facility], receiving LIMA to LAD, free radial graft to PDA, saphenous graft to first diagonal, saphenous graft to obtuse marginal.  b. MPS, May 2008, per [facility]: Normal perfusion, EF 68%.   c. April 2012 - exercise Cardiolite  WNL, EF preserved.  d. 7/24/19 MPS exercise cardiolite wnl  2. Diabetes mellitus, on Metformin  times one year, onset 2009:  March 2015.  HG A1c 8.6.   3.  Dyslipidemia:  a. March 2015:  Total cholesterol 143, triglycerides  326, HDL 39, LDL 38.  4. Hypertension, presumed essential.   5. CVD - 4/17 CUS wnl  6.  Baseline right bundle-branch block.   7. Left Lower Extremity Edema  1. US LLE: 2016 Partial DVT      CC:   Chief Complaint   Patient presents with   • Coronary artery disease involving native coronary artery of        Allergies  Allergies   Allergen Reactions   • Cardizem [Diltiazem Hcl]    • Celebrex [Celecoxib]        Current Medications    Current Outpatient Medications:   •  aspirin 81 MG tablet, Take 81 mg by mouth Daily., Disp: , Rfl:   •  atorvastatin (LIPITOR) 20 MG tablet, Take 20 mg by mouth Daily., Disp: , Rfl:   •  glipiZIDE (GLUCOTROL) 5 MG tablet, Take 5 mg by mouth Daily., Disp: , Rfl:   •  metFORMIN (GLUCOPHAGE) 1000 MG tablet, Take 1,000 mg by mouth 2 (Two) Times a Day With Meals., Disp: , Rfl:   •  metoprolol succinate XL (TOPROL-XL) 50 MG 24 hr tablet, Take 25 mg by mouth Daily., Disp: , Rfl:   •  nitroglycerin (NITROSTAT) 0.4 MG SL tablet, Place 0.4 mg under the tongue Every 5 (Five) Minutes As Needed for chest  "pain. Take no more than 3 doses in 15 minutes., Disp: , Rfl:   •  NON FORMULARY, New medicine for prostate pt states does not know name, Disp: , Rfl:   •  tamsulosin (FLOMAX) 0.4 MG capsule 24 hr capsule, Take 1 capsule by mouth Every Night., Disp: 30 capsule, Rfl: 3  •  omalizumab (XOLAIR) 150 MG injection, Inject  under the skin into the appropriate area as directed 1 (One) Time., Disp: , Rfl:       History of Present Illness   Giorgio Omer is a 70 y.o. year old male here for follow up.  Today, the patient reports that he is doing well from a cardiac standpoint.  He states that he is significantly limited in his physical activities due to hip pain.  He states that this hip pain does not allow him to walk very far and causes excruciating pain.  Despite his lack of physical activity, the patient denies any chest pain, dyspnea, dyspnea on exertion, orthopnea, PND, palpitations, lower extremity edema, or claudication.  He also denies any signs or symptoms of sleep apnea.    [facility] him.  He would like to have a referral to have a steroid shot placed in his trochanteric bursa.    The patient reports that he had his A1c and lipid labs drawn earlier this year but is unaware of his current status.    OBJECTIVE:  Vitals:    08/06/21 1440   BP: 130/56   BP Location: Right arm   Patient Position: Sitting   Pulse: 56   SpO2: 98%   Weight: 79.8 kg (176 lb)   Height: 188 cm (74\")     Body mass index is 22.6 kg/m².    Vitals reviewed.   Constitutional:       General: Awake.      Appearance: Healthy appearance. Well-developed. Cachectic.   Neck:      Thyroid: No thyromegaly.      Vascular: No carotid bruit or JVD.      Trachea: No tracheal deviation.   Pulmonary:      Effort: Pulmonary effort is normal. No respiratory distress.      Breath sounds: Normal breath sounds. No wheezing. No rales.   Cardiovascular:      Normal rate. Regular " rhythm. Normal S1. Normal S2.      Murmurs: There is no murmur.      No gallop. No click. No rub.   Pulses:     Intact distal pulses.   Edema:     Peripheral edema absent.   Abdominal:      General: Bowel sounds are normal.      Palpations: Abdomen is soft. There is no abdominal mass.      Tenderness: There is no abdominal tenderness. There is no guarding.   Musculoskeletal:      Cervical back: Normal range of motion and neck supple. Skin:     General: Skin is warm and dry.   Neurological:      Mental Status: Alert, oriented to person, place, and time and oriented to person, place and time.   Psychiatric:         Attention and Perception: Attention normal.         Mood and Affect: Mood normal.         Speech: Speech normal.         Behavior: Behavior normal. Behavior is cooperative.         Diagnostic Data:  Procedures      ASSESSMENT:   Diagnosis Plan   1. Right hip pain     2. Coronary artery disease involving native coronary artery of native heart without angina pectoris     3. Essential hypertension     4. Right bundle branch block     5. Diabetes mellitus due to underlying condition with hyperosmolarity without coma, without long-term current use of insulin (CMS/[facility])     6. Dyslipidemia         PLAN:  1.  Right hip pain -referral to orthopedics for cortisone injections in right trochanteric bursa.  2.  Today the patient does not relay any anginal nor atypical anginal symptoms.  3. Hypertension well-controlled patient is not on any antihypertensive.  4.  [facility] of [facility] mellitus and dyslipidemia.    Scribe: Sanju Everett PA-C for Dr. Dionicio Trevino M.D. [facility]

## 2021-09-23 DIAGNOSIS — M25.559 HIP PAIN: Primary | ICD-10-CM

## 2021-09-30 ENCOUNTER — OFFICE VISIT (OUTPATIENT)
Dept: ORTHOPEDIC SURGERY | Facility: CLINIC | Age: 71
End: 2021-09-30

## 2021-09-30 VITALS
BODY MASS INDEX: 22.59 KG/M2 | DIASTOLIC BLOOD PRESSURE: 61 MMHG | HEIGHT: 74 IN | SYSTOLIC BLOOD PRESSURE: 138 MMHG | HEART RATE: 57 BPM

## 2021-09-30 DIAGNOSIS — M70.61 TROCHANTERIC BURSITIS OF RIGHT HIP: Primary | ICD-10-CM

## 2021-09-30 DIAGNOSIS — M25.551 RIGHT HIP PAIN: ICD-10-CM

## 2021-09-30 DIAGNOSIS — M16.11 PRIMARY OSTEOARTHRITIS OF RIGHT HIP: ICD-10-CM

## 2021-09-30 PROCEDURE — 99204 OFFICE O/P NEW MOD 45 MIN: CPT | Performed by: ORTHOPAEDIC SURGERY

## 2021-09-30 PROCEDURE — 20610 DRAIN/INJ JOINT/BURSA W/O US: CPT | Performed by: ORTHOPAEDIC SURGERY

## 2021-09-30 RX ORDER — BUPIVACAINE HYDROCHLORIDE 2.5 MG/ML
3 INJECTION, SOLUTION EPIDURAL; INFILTRATION; INTRACAUDAL
Status: COMPLETED | OUTPATIENT
Start: 2021-09-30 | End: 2021-09-30

## 2021-09-30 RX ORDER — TRIAMCINOLONE ACETONIDE 40 MG/ML
80 INJECTION, SUSPENSION INTRA-ARTICULAR; INTRAMUSCULAR
Status: COMPLETED | OUTPATIENT
Start: 2021-09-30 | End: 2021-09-30

## 2021-09-30 RX ORDER — LIDOCAINE HYDROCHLORIDE 10 MG/ML
3 INJECTION, SOLUTION EPIDURAL; INFILTRATION; INTRACAUDAL; PERINEURAL
Status: COMPLETED | OUTPATIENT
Start: 2021-09-30 | End: 2021-09-30

## 2021-09-30 RX ADMIN — TRIAMCINOLONE ACETONIDE 80 MG: 40 INJECTION, SUSPENSION INTRA-ARTICULAR; INTRAMUSCULAR at 13:09

## 2021-09-30 RX ADMIN — LIDOCAINE HYDROCHLORIDE 3 ML: 10 INJECTION, SOLUTION EPIDURAL; INFILTRATION; INTRACAUDAL; PERINEURAL at 13:09

## 2021-09-30 RX ADMIN — BUPIVACAINE HYDROCHLORIDE 3 ML: 2.5 INJECTION, SOLUTION EPIDURAL; INFILTRATION; INTRACAUDAL at 13:09

## 2021-09-30 NOTE — PROGRESS NOTES
Procedure   Large Joint Arthrocentesis: R greater trochanteric bursa  Date/Time: 9/30/2021 1:09 PM  Consent given by: patient  Site marked: site marked  Timeout: Immediately prior to procedure a time out was called to verify the correct patient, procedure, equipment, support staff and site/side marked as required   Supporting Documentation  Indications: pain   Procedure Details  Location: hip - R greater trochanteric bursa  Preparation: Patient was prepped and draped in the usual sterile fashion  Needle size: 22 G  Approach: lateral  Medications administered: 3 mL bupivacaine (PF) 0.25 %; 3 mL lidocaine PF 1% 1 %; 80 mg triamcinolone acetonide 40 MG/ML  Patient tolerance: patient tolerated the procedure well with no immediate complications

## 2021-09-30 NOTE — PROGRESS NOTES
Orthopaedic Clinic Note: Hip New Patient    Chief Complaint   Patient presents with   • Right Hip - Initial Evaluation        HPI  Consult from: Dionicio Trevino MD    Giorgio Omer is a 70 y.o. male who presents with right hip pain for 3 year(s). Onset atraumatic and gradual in nature. Pain is localized to lateral trochanter and is a 7/10 on the pain scale.Pain is described as stabbing. Associated symptoms include pain.  The pain is worse with walking and standing; sitting improve the pain. Previous treatments have included: physical therapy since symptom onset. Although some transient relief was reported with these interventions, these conservative measures have failed and symptoms have persisted. The patient is limited in daily activities and has had a significant decrease in quality of life as a result. He denies fevers, chills, or constitutional symptoms.    I have reviewed the following portions of the patient's history:History of Present Illness    Past Medical History:   Diagnosis Date   • Coronary artery disease 10/17/2016    Surgical revascularization, Dr. Bryant, 07/01, [facility], receiving LIMA to LAD, free radial graft to PDA, saphenous graft to first diagonal, saphenous graft to obtuse marginal. MPS, May 2008, per [facility]: Normal perfusion, EF 68%.  April 2012 - exercise Cardiolite WNL, EF preserved.   • Diabetes mellitus (CMS/[facility]) 10/17/2016    on Metformin times one year, onset 2009:  March 2015.  HG A1c 8.6.    • Dyslipidemia 10/17/2016    March 2015:  Total cholesterol 143, triglycerides 326, HDL 39, LDL 38.   • Hypertension 10/17/2016    presumed essential.    • Right bundle branch block 10/17/2016    baseline      Past Surgical History:   Procedure Laterality Date   • CORONARY ARTERY BYPASS GRAFT        Family History   Problem Relation Age of Onset   • Ovarian cancer Mother    • Hypertension Father    • Stroke Father      Social History     Socioeconomic History   • Marital status:       Spouse name: Not on file   • Number of children: Not on file   • Years of education: Not on file   • Highest education level: Not on file   Tobacco Use   • Smoking status: Former Smoker     Types: Cigarettes     Quit date: 10/28/2001     Years since quittin.9   • Smokeless tobacco: Never Used   Vaping Use   • Vaping Use: Never used   Substance and Sexual Activity   • Alcohol use: No   • Drug use: No   • Sexual activity: Defer      Current Outpatient Medications on File Prior to Visit   Medication Sig Dispense Refill   • aspirin 81 MG tablet Take 81 mg by mouth Daily.     • atorvastatin (LIPITOR) 20 MG tablet Take 20 mg by mouth Daily.     • glipiZIDE (GLUCOTROL) 5 MG tablet Take 5 mg by mouth Daily.     • metFORMIN (GLUCOPHAGE) 1000 MG tablet Take 1,000 mg by mouth 2 (Two) Times a Day With Meals.     • metoprolol succinate XL (TOPROL-XL) 50 MG 24 hr tablet Take 25 mg by mouth Daily.     • nitroglycerin (NITROSTAT) 0.4 MG SL tablet Place 0.4 mg under the tongue Every 5 (Five) Minutes As Needed for chest pain. Take no more than 3 doses in 15 minutes.     • NON FORMULARY New medicine for prostate pt states does not know name     • tamsulosin (FLOMAX) 0.4 MG capsule 24 hr capsule Take 1 capsule by mouth Every Night. 30 capsule 3     No current facility-administered medications on file prior to visit.      Allergies   Allergen Reactions   • Cardizem [Diltiazem Hcl]    • Celebrex [Celecoxib]         Review of Systems   Constitutional: Negative.    HENT: Negative.    Eyes: Negative.    Respiratory: Negative.    Cardiovascular: Negative.    Gastrointestinal: Negative.    Endocrine: Negative.    Genitourinary: Negative.    Musculoskeletal: Positive for arthralgias.   Skin: Negative.    Allergic/Immunologic: Negative.    Neurological: Negative.    Hematological: Negative.    Psychiatric/Behavioral: Negative.         The patient's Review of Systems was personally reviewed and confirmed as accurate.    The following  "portions of the patient's history were reviewed and updated as appropriate: allergies, current medications, past family history, past medical history, past social history, past surgical history and problem list.    Physical Exam  Blood pressure 138/61, pulse 57, height 188 cm (74.02\").    Body mass index is 22.59 kg/m².    GENERAL APPEARANCE: awake, alert & oriented x 3, in no acute distress and well developed, well nourished  PSYCH: normal affect  LUNGS:  breathing nonlabored  EYES: sclera anicteric  CARDIOVASCULAR: palpable dorsalis pedis, palpable posterior tibial bilaterally. Capillary refill less than 2 seconds  EXTREMITIES: no clubbing, cyanosis  GAIT:  Normal           Right Hip Exam:  RANGE OF MOTION:   FLEXION CONTRACTURE: None   FLEXION: 110 degrees   INTERNAL ROTATION: 20 degrees at 90 degrees of flexion   EXTERNAL ROTATION: 40 degrees at 90 degrees of flexion    PAIN WITH HIP MOTION: no  PAIN WITH LOGROLL: no  STINCHFIELD TEST: negative    KNEE EXAM: full knee ROM (0-120 degrees), stable to varus and valgus stress at terminal extension and 30 degrees flexion     STRENGTH:  5/5 hip adduction, abduction, flexion. 5/5 strength knee flexion, extension. 5/5 strength ankle dorsiflexion and plantarflexion.     GREATER TROCHANTER BURSAL PAIN:  yes     REFLEXES:   PATELLAR 2+/4   ACHILLES 2+/4    CLONUS: negative  STRAIGHT LEG TEST:   negative    SENSATION TO LIGHT TOUCH:  DEEP PERONEAL/SUPERFICIAL PERONEAL/SURAL/SAPHENOUS/TIBIAL:  intact    EDEMA:   no  ERYTHEMA:  no  WOUNDS/INCISIONS: no overlying skin problems.      Left Hip Exam:   RANGE OF MOTION:   FLEXION CONTRACTURE: None   FLEXION: 110 degrees   INTERNAL ROTATION: 20 degrees at 90 degrees of flexion   EXTERNAL ROTATION: 40 degrees at 90 degrees of flexion    PAIN WITH HIP MOTION: no  PAIN WITH LOGROLL: no  STINCHFIELD TEST: negative    KNEE EXAM: full knee ROM (0-120 degrees), stable to varus and valgus stress at terminal extension and 30 degrees flexion "     STRENGTH:  5/5 hip adduction, abduction, flexion. 5/5 strength knee flexion, extension. 5/5 strength ankle dorsiflexion and plantarflexion.     GREATER TROCHANTER BURSAL PAIN:  no     REFLEXES:   PATELLAR 2+/4   ACHILLES 2+/4    CLONUS: negative  STRAIGHT LEG TEST:   negative    SENSATION TO LIGHT TOUCH:  DEEP PERONEAL/SUPERFICIAL PERONEAL/SURAL/SAPHENOUS/TIBIAL:  intact    EDEMA:   no  ERYTHEMA:  no  WOUNDS/INCISIONS: no overlying skin problems.      ------------------------------------------------------------------    LEG LENGTHS:  equal  _____________________________________________________  _____________________________________________________    RADIOGRAPHIC FINDINGS:   Indication: Right hip pain    Comparison: No prior xrays are available for comparison    AP pelvis, hip 2 views: Right: mild joint space narrowing, minimal osteophyte formation; Left: mild joint space narrowing, minimal osteophyte formation    Assessment/Plan:   Diagnosis Plan   1. Trochanteric bursitis of right hip     2. Right hip pain  XR Hip With or Without Pelvis 2 - 3 View Right   3. Primary osteoarthritis of right hip       Patient has mild but asymptomatic hip arthritis on the right side.  His range of motion symmetric and asymptomatic on exam today.  The majority of his pain is localized to trochanteric bursa.  He is suffering with trochanteric bursitis.  I discussed treatment options with him.  He is agreeable to trochanteric bursa cortisone injection today.  He will follow-up in 3 months for repeat assessment.    Procedure Note:  I discussed with the patient the potential benefits of performing a therapeutic injection of the right hip trochanteric bursa as well as potential risks including but not limited to infection, swelling, pain, bleeding, bruising, nerve/vessel damage, skin color changes, transient elevation in blood glucose levels, and fat atrophy. After informed consent and verifying correct patient, procedure site, and  type of procedure, the area was prepped with alcohol, ethyl chloride was used to numb the skin. Via the direct lateral approach, 3cc of 1% lidocaine, 3cc of 0.25% bupivicaine and 2 cc of 40mg/ml of Kenalog were injected into the right hip trochanteric bursa. The patient tolerated the procedure well. There were no complications. A sterile dressing was placed over the injection site.      Jameel Patiño MD  09/30/21  13:11 EDT

## 2022-01-04 ENCOUNTER — OFFICE VISIT (OUTPATIENT)
Dept: ORTHOPEDIC SURGERY | Facility: CLINIC | Age: 72
End: 2022-01-04

## 2022-01-04 VITALS
DIASTOLIC BLOOD PRESSURE: 76 MMHG | SYSTOLIC BLOOD PRESSURE: 126 MMHG | BODY MASS INDEX: 22.07 KG/M2 | HEIGHT: 74 IN | WEIGHT: 172 LBS

## 2022-01-04 DIAGNOSIS — M25.551 RIGHT HIP PAIN: ICD-10-CM

## 2022-01-04 DIAGNOSIS — M70.61 TROCHANTERIC BURSITIS OF RIGHT HIP: Primary | ICD-10-CM

## 2022-01-04 PROCEDURE — 99213 OFFICE O/P EST LOW 20 MIN: CPT | Performed by: ORTHOPAEDIC SURGERY

## 2022-01-04 NOTE — PROGRESS NOTES
Orthopaedic Clinic Note: Hip Established Patient    Chief Complaint   Patient presents with   • Follow-up     3 month f/u Trochanteric bursitis of right hip; cortisone injection 2021        HPI    It has been 3  month(s) since Mr. Omer's last visit. He returns to clinic today for follow-up right hip trochanteric bursitis.  He underwent cortisone injection the right hip 3 months ago.  The injection worked well for about 4 days.  His pain is gradually returned.  Rates his pain 9/10 on the pain scale today.  He is localizing pain in the lateral trochanter.  He is ambulating with no assistive device.  Denies fevers chills or constitutional symptoms.  Overall he is doing about the same.    Past Medical History:   Diagnosis Date   • Coronary artery disease 10/17/2016    Surgical revascularization, Dr. Bryant, , [facility], receiving LIMA to LAD, free radial graft to PDA, saphenous graft to first diagonal, saphenous graft to obtuse marginal. MPS, May 2008, per [facility]: Normal perfusion, EF 68%.  2012 - exercise Cardiolite WNL, EF preserved.   • Diabetes mellitus (HCC) 10/17/2016    on Metformin times one year, onset :  2015.  HG A1c 8.6.    • Dyslipidemia 10/17/2016    2015:  Total cholesterol 143, triglycerides 326, HDL 39, LDL 38.   • Hypertension 10/17/2016    presumed essential.    • Right bundle branch block 10/17/2016    baseline      Past Surgical History:   Procedure Laterality Date   • CORONARY ARTERY BYPASS GRAFT        Family History   Problem Relation Age of Onset   • Ovarian cancer Mother    • Hypertension Father    • Stroke Father      Social History     Socioeconomic History   • Marital status:    Tobacco Use   • Smoking status: Former Smoker     Types: Cigarettes     Quit date: 10/28/2001     Years since quittin.2   • Smokeless tobacco: Never Used   Vaping Use   • Vaping Use: Never used   Substance and Sexual Activity   • Alcohol use: No   • Drug use: No   • Sexual  "activity: Defer      Current Outpatient Medications on File Prior to Visit   Medication Sig Dispense Refill   • aspirin 81 MG tablet Take 81 mg by mouth Daily.     • atorvastatin (LIPITOR) 20 MG tablet Take 20 mg by mouth Daily.     • glipiZIDE (GLUCOTROL) 5 MG tablet Take 5 mg by mouth Daily.     • metFORMIN (GLUCOPHAGE) 1000 MG tablet Take 1,000 mg by mouth 2 (Two) Times a Day With Meals.     • metoprolol succinate XL (TOPROL-XL) 50 MG 24 hr tablet Take 25 mg by mouth Daily.     • nitroglycerin (NITROSTAT) 0.4 MG SL tablet Place 0.4 mg under the tongue Every 5 (Five) Minutes As Needed for chest pain. Take no more than 3 doses in 15 minutes.     • NON FORMULARY New medicine for prostate pt states does not know name     • tamsulosin (FLOMAX) 0.4 MG capsule 24 hr capsule Take 1 capsule by mouth Every Night. 30 capsule 3     No current facility-administered medications on file prior to visit.      Allergies   Allergen Reactions   • Cardizem [Diltiazem Hcl]    • Celebrex [Celecoxib]         Review of Systems   Constitutional: Negative.    HENT: Negative.    Eyes: Negative.    Respiratory: Negative.    Cardiovascular: Negative.    Gastrointestinal: Negative.    Endocrine: Negative.    Genitourinary: Negative.    Musculoskeletal: Positive for arthralgias.   Skin: Negative.    Allergic/Immunologic: Negative.    Neurological: Negative.    Hematological: Negative.    Psychiatric/Behavioral: Negative.         The patient's Review of Systems was personally reviewed and confirmed as accurate.    Physical Exam  Blood pressure 126/76, height 188 cm (74.02\"), weight 78 kg (172 lb).    Body mass index is 22.07 kg/m².    GENERAL APPEARANCE: awake, alert, oriented, in no acute distress and well developed, well nourished  LUNGS:  breathing nonlabored  EXTREMITIES: no clubbing, cyanosis  PERIPHERAL PULSES: palpable dorsalis pedis and posterior tibial pulses bilaterally.    GAIT:  Normal            Hip Exam:  Right    RANGE OF " MOTION:  EXTENSION/FLEXION:  normal (0-110 degrees)  IR (at 90 degrees of flexion):  20  ER (at 90 degrees of flexion):  40  PAIN WITH HIP MOTION:  no  PAIN WITH LOGROLL:  no     STINCHFIELD TEST: negative    STRENGTH:  ABDUCTOR:  5/5  ADDUCTOR:  5/5  HIP FLEXION:  5/5    GREATER TROCHANTER BURSAL PAIN:  yes    SENSATION TO LIGHT TOUCH:  DEEP PERONEAL/SUPERFICIAL PERONEAL/SURAL/SAPHENOUS/TIBIAL:   intact    EDEMA:  no  ERYTHEMA:  no  WOUNDS/INCISIONS:   no  _________________________________________________________________  _________________________________________________________________    RADIOGRAPHIC FINDINGS:   No new imaging today    Assessment/Plan:   Diagnosis Plan   1. Trochanteric bursitis of right hip  MRI Hip Right Without Contrast   2. Right hip pain       Patient's right hip pain has been refractory to conservative treatments including physical therapy, over-the-counter anti-inflammatories, and cortisone injection of the trochanteric bursa.  He is continue to have debilitating pain localized lateral trochanter.  His hip range of motion is well-preserved and radiographs demonstrate mild arthritic changes.  I recommend obtaining an MRI of the right hip to evaluate for potential source of his lateral based hip pain.  He is agreeable this plan.  He will follow-up after the MRI.    Jameel Patiño MD  01/04/22  15:01 EST

## 2022-01-24 ENCOUNTER — HOSPITAL ENCOUNTER (OUTPATIENT)
Dept: MRI IMAGING | Facility: HOSPITAL | Age: 72
Discharge: HOME | End: 2022-01-24
Admitting: ORTHOPAEDIC SURGERY

## 2022-01-24 DIAGNOSIS — M70.61 TROCHANTERIC BURSITIS OF RIGHT HIP: ICD-10-CM

## 2022-01-24 PROCEDURE — 73721 MRI JNT OF LWR EXTRE W/O DYE: CPT

## 2022-03-18 ENCOUNTER — TELEPHONE (OUTPATIENT)
Dept: ORTHOPEDIC SURGERY | Facility: CLINIC | Age: 72
End: 2022-03-18

## 2022-03-31 ENCOUNTER — OFFICE VISIT (OUTPATIENT)
Dept: ORTHOPEDIC SURGERY | Facility: CLINIC | Age: 72
End: 2022-03-31

## 2022-03-31 VITALS
SYSTOLIC BLOOD PRESSURE: 112 MMHG | WEIGHT: 172.6 LBS | HEIGHT: 74 IN | DIASTOLIC BLOOD PRESSURE: 60 MMHG | BODY MASS INDEX: 22.15 KG/M2

## 2022-03-31 DIAGNOSIS — M16.11 PRIMARY OSTEOARTHRITIS OF RIGHT HIP: ICD-10-CM

## 2022-03-31 DIAGNOSIS — M70.61 TROCHANTERIC BURSITIS OF RIGHT HIP: Primary | ICD-10-CM

## 2022-03-31 PROCEDURE — 20610 DRAIN/INJ JOINT/BURSA W/O US: CPT | Performed by: ORTHOPAEDIC SURGERY

## 2022-03-31 PROCEDURE — 99214 OFFICE O/P EST MOD 30 MIN: CPT | Performed by: ORTHOPAEDIC SURGERY

## 2022-03-31 RX ORDER — LIDOCAINE HYDROCHLORIDE 10 MG/ML
3 INJECTION, SOLUTION EPIDURAL; INFILTRATION; INTRACAUDAL; PERINEURAL
Status: COMPLETED | OUTPATIENT
Start: 2022-03-31 | End: 2022-03-31

## 2022-03-31 RX ORDER — TRIAMCINOLONE ACETONIDE 40 MG/ML
80 INJECTION, SUSPENSION INTRA-ARTICULAR; INTRAMUSCULAR
Status: COMPLETED | OUTPATIENT
Start: 2022-03-31 | End: 2022-03-31

## 2022-03-31 RX ADMIN — TRIAMCINOLONE ACETONIDE 80 MG: 40 INJECTION, SUSPENSION INTRA-ARTICULAR; INTRAMUSCULAR at 13:55

## 2022-03-31 RX ADMIN — LIDOCAINE HYDROCHLORIDE 3 ML: 10 INJECTION, SOLUTION EPIDURAL; INFILTRATION; INTRACAUDAL; PERINEURAL at 13:55

## 2022-03-31 NOTE — PROGRESS NOTES
Procedure   - Large Joint Arthrocentesis: R greater trochanteric bursa on 3/31/2022 1:55 PM  Indications: pain  Details: 22 G needle, medial approach  Medications: 3 mL lidocaine PF 1% 1 %; 80 mg triamcinolone acetonide 40 MG/ML (1cc .75% Marcaine NDC 3738-8693-21 LOT [id number] EXP 04.01.2023)  Outcome: tolerated well, no immediate complications  Procedure, treatment alternatives, risks and benefits explained, specific risks discussed. Consent was given by the patient. Immediately prior to procedure a time out was called to verify the correct patient, procedure, equipment, support staff and site/side marked as required. Patient was prepped and draped in the usual sterile fashion.

## 2022-03-31 NOTE — PROGRESS NOTES
Orthopaedic Clinic Note: Hip Established Patient    Chief Complaint   Patient presents with   • Follow-up     2.5 month MRI f/u        HPI    It has been 2.5  month(s) since Mr. Omer's last visit. He returns to clinic today for follow-up MRI.  Patient was last seen 2-1/2 months ago and MRI of the right hip was ordered.  He returns to clinic today for follow-up assessment.  He continues localized pain lateral trochanter they rates a 9/10 on the pain scale today.  He has failed anti-inflammatories as well as cortisone injections and therapy in the past.  He is here today to discuss treatment as well as the results of his MRI.  Overall he is doing worse.    Past Medical History:   Diagnosis Date   • Coronary artery disease 10/17/2016    Surgical revascularization, Dr. Bryant, , [facility], receiving LIMA to LAD, free radial graft to PDA, saphenous graft to first diagonal, saphenous graft to obtuse marginal. MPS, May 2008, per [facility]: Normal perfusion, EF 68%.  2012 - exercise Cardiolite WNL, EF preserved.   • Diabetes mellitus (HCC) 10/17/2016    on Metformin times one year, onset :  2015.  HG A1c 8.6.    • Dyslipidemia 10/17/2016    2015:  Total cholesterol 143, triglycerides 326, HDL 39, LDL 38.   • Hypertension 10/17/2016    presumed essential.    • Right bundle branch block 10/17/2016    baseline      Past Surgical History:   Procedure Laterality Date   • CORONARY ARTERY BYPASS GRAFT        Family History   Problem Relation Age of Onset   • Ovarian cancer Mother    • Hypertension Father    • Stroke Father      Social History     Socioeconomic History   • Marital status:    Tobacco Use   • Smoking status: Former Smoker     Types: Cigarettes     Quit date: 10/28/2001     Years since quittin.4   • Smokeless tobacco: Never Used   Vaping Use   • Vaping Use: Never used   Substance and Sexual Activity   • Alcohol use: No   • Drug use: No   • Sexual activity: Defer      Current  "Outpatient Medications on File Prior to Visit   Medication Sig Dispense Refill   • aspirin 81 MG tablet Take 81 mg by mouth Daily.     • atorvastatin (LIPITOR) 20 MG tablet Take 20 mg by mouth Daily.     • insulin aspart (novoLOG) 100 UNIT/ML injection Inject  under the skin into the appropriate area as directed 3 (Three) Times a Day Before Meals.     • metoprolol succinate XL (TOPROL-XL) 50 MG 24 hr tablet Take 25 mg by mouth Daily.     • nitroglycerin (NITROSTAT) 0.4 MG SL tablet Place 0.4 mg under the tongue Every 5 (Five) Minutes As Needed for Chest Pain. Take no more than 3 doses in 15 minutes.     • NON FORMULARY New medicine for prostate pt states does not know name     • tamsulosin (FLOMAX) 0.4 MG capsule 24 hr capsule Take 1 capsule by mouth Every Night. 30 capsule 3   • [DISCONTINUED] glipiZIDE (GLUCOTROL) 5 MG tablet Take 5 mg by mouth Daily.     • [DISCONTINUED] metFORMIN (GLUCOPHAGE) 1000 MG tablet Take 1,000 mg by mouth 2 (Two) Times a Day With Meals.       No current facility-administered medications on file prior to visit.      Allergies   Allergen Reactions   • Cardizem [Diltiazem Hcl]    • Celebrex [Celecoxib]         Review of Systems   Constitutional: Negative.    HENT: Negative.    Eyes: Negative.    Respiratory: Negative.    Cardiovascular: Negative.    Gastrointestinal: Negative.    Endocrine: Negative.    Genitourinary: Negative.    Musculoskeletal: Positive for arthralgias.   Skin: Negative.    Allergic/Immunologic: Negative.    Neurological: Negative.    Hematological: Negative.    Psychiatric/Behavioral: Negative.         The patient's Review of Systems was personally reviewed and confirmed as accurate.    Physical Exam  Blood pressure 112/60, height 188 cm (74.02\"), weight 78.3 kg (172 lb 9.6 oz).    Body mass index is 22.15 kg/m².    GENERAL APPEARANCE: awake, alert, oriented, in no acute distress and well developed, well nourished  LUNGS:  breathing nonlabored  EXTREMITIES: no clubbing, " cyanosis  PERIPHERAL PULSES: palpable dorsalis pedis and posterior tibial pulses bilaterally.    GAIT:  Normal            Hip Exam:  Right     RANGE OF MOTION:  EXTENSION/FLEXION:  normal (0-110 degrees)  IR (at 90 degrees of flexion):  20  ER (at 90 degrees of flexion):  40  PAIN WITH HIP MOTION:  no  PAIN WITH LOGROLL:  no      STINCHFIELD TEST: negative     STRENGTH:  ABDUCTOR:    5/5  ADDUCTOR:  5/5  HIP FLEXION:  5/5     GREATER TROCHANTER BURSAL PAIN:  yes    SENSATION TO LIGHT TOUCH:  DEEP PERONEAL/SUPERFICIAL PERONEAL/SURAL/SAPHENOUS/TIBIAL:   intact    EDEMA:  no  ERYTHEMA:  no  WOUNDS/INCISIONS:   no  _________________________________________________________________  _________________________________________________________________    RADIOGRAPHIC FINDINGS:   MRI of the right hip from 1/24/2022 was personally interpreted.  MRI shows mild arthritis bilaterally of the hips with no significant joint space narrowing or osteophyte formation.  No evidence of subchondral edema, labral tear, or gluteal tendon tear.    Assessment/Plan:   Diagnosis Plan   1. Trochanteric bursitis of right hip     2. Primary osteoarthritis of right hip       I reviewed the MRI findings with the patient.  He clinically presents as trochanteric bursitis but his MRI is essentially benign.  I discussed treatment options.  He is agreeable to proceeding with trochanteric bursa cortisone injection today.  He will follow-up as needed.    Procedure Note:  I discussed with the patient the potential benefits of performing a therapeutic injection of the right hip trochanteric bursa as well as potential risks including but not limited to infection, swelling, pain, bleeding, bruising, nerve/vessel damage, skin color changes, transient elevation in blood glucose levels, and fat atrophy. After informed consent and verifying correct patient, procedure site, and type of procedure, the area was prepped with alcohol, ethyl chloride was used to numb the  skin. Via the direct lateral approach, 3cc of 1% lidocaine, 1 cc of 0.75% Marcaine and 2 cc of 40mg/ml of Kenalog were injected into the right hip trochanteric bursa. The patient tolerated the procedure well. There were no complications. A sterile dressing was placed over the injection site.      Jameel Patiño MD  03/31/22  13:57 EDT

## 2022-08-26 ENCOUNTER — HOSPITAL ENCOUNTER (INPATIENT)
Facility: HOSPITAL | Age: 72
LOS: 2 days | Discharge: HOME | End: 2022-08-28
Attending: INTERNAL MEDICINE | Admitting: INTERNAL MEDICINE

## 2022-08-26 PROBLEM — I21.4 NSTEMI (NON-ST ELEVATED MYOCARDIAL INFARCTION) (HCC): Status: ACTIVE | Noted: 2022-08-26

## 2022-08-26 LAB
CATH EF ESTIMATED: 55 %
GLUCOSE BLDC GLUCOMTR-MCNC: 144 MG/DL (ref 70–130)

## 2022-08-26 PROCEDURE — 25010000002 MIDAZOLAM PER 1 MG: Performed by: INTERNAL MEDICINE

## 2022-08-26 PROCEDURE — 93005 ELECTROCARDIOGRAM TRACING: CPT | Performed by: INTERNAL MEDICINE

## 2022-08-26 PROCEDURE — 99222 1ST HOSP IP/OBS MODERATE 55: CPT | Performed by: INTERNAL MEDICINE

## 2022-08-26 PROCEDURE — C1887 CATHETER, GUIDING: HCPCS | Performed by: INTERNAL MEDICINE

## 2022-08-26 PROCEDURE — B2111ZZ FLUOROSCOPY OF MULTIPLE CORONARY ARTERIES USING LOW OSMOLAR CONTRAST: ICD-10-PCS | Performed by: INTERNAL MEDICINE

## 2022-08-26 PROCEDURE — C1725 CATH, TRANSLUMIN NON-LASER: HCPCS | Performed by: INTERNAL MEDICINE

## 2022-08-26 PROCEDURE — 25010000002 BIVALIRUDIN TRIFLUOROACETATE 250 MG RECONSTITUTED SOLUTION 1 EACH VIAL: Performed by: INTERNAL MEDICINE

## 2022-08-26 PROCEDURE — C1894 INTRO/SHEATH, NON-LASER: HCPCS | Performed by: INTERNAL MEDICINE

## 2022-08-26 PROCEDURE — C1769 GUIDE WIRE: HCPCS | Performed by: INTERNAL MEDICINE

## 2022-08-26 PROCEDURE — 92937 PRQ TRLUML REVSC CAB GRF 1: CPT | Performed by: INTERNAL MEDICINE

## 2022-08-26 PROCEDURE — 82962 GLUCOSE BLOOD TEST: CPT

## 2022-08-26 PROCEDURE — 93010 ELECTROCARDIOGRAM REPORT: CPT | Performed by: INTERNAL MEDICINE

## 2022-08-26 PROCEDURE — 027034Z DILATION OF CORONARY ARTERY, ONE ARTERY WITH DRUG-ELUTING INTRALUMINAL DEVICE, PERCUTANEOUS APPROACH: ICD-10-PCS | Performed by: INTERNAL MEDICINE

## 2022-08-26 PROCEDURE — C9604 PERC D-E COR REVASC T CABG S: HCPCS | Performed by: INTERNAL MEDICINE

## 2022-08-26 PROCEDURE — 4A023N7 MEASUREMENT OF CARDIAC SAMPLING AND PRESSURE, LEFT HEART, PERCUTANEOUS APPROACH: ICD-10-PCS | Performed by: INTERNAL MEDICINE

## 2022-08-26 PROCEDURE — 93459 L HRT ART/GRFT ANGIO: CPT | Performed by: INTERNAL MEDICINE

## 2022-08-26 PROCEDURE — C1874 STENT, COATED/COV W/DEL SYS: HCPCS | Performed by: INTERNAL MEDICINE

## 2022-08-26 PROCEDURE — B2151ZZ FLUOROSCOPY OF LEFT HEART USING LOW OSMOLAR CONTRAST: ICD-10-PCS | Performed by: INTERNAL MEDICINE

## 2022-08-26 PROCEDURE — 25010000002 FENTANYL CITRATE (PF) 50 MCG/ML SOLUTION: Performed by: INTERNAL MEDICINE

## 2022-08-26 PROCEDURE — C1760 CLOSURE DEV, VASC: HCPCS | Performed by: INTERNAL MEDICINE

## 2022-08-26 PROCEDURE — B2131ZZ FLUOROSCOPY OF MULTIPLE CORONARY ARTERY BYPASS GRAFTS USING LOW OSMOLAR CONTRAST: ICD-10-PCS | Performed by: INTERNAL MEDICINE

## 2022-08-26 PROCEDURE — 25010000002 PHENYLEPHRINE 10 MG/ML SOLUTION: Performed by: INTERNAL MEDICINE

## 2022-08-26 PROCEDURE — 0 IOPAMIDOL PER 1 ML: Performed by: INTERNAL MEDICINE

## 2022-08-26 DEVICE — XIENCE SKYPOINT™ EVEROLIMUS ELUTING CORONARY STENT SYSTEM 4.00 MM X 38 MM / RAPID-EXCHANGE
Type: IMPLANTABLE DEVICE | Status: FUNCTIONAL
Brand: XIENCE SKYPOINT™

## 2022-08-26 RX ORDER — SODIUM CHLORIDE 0.9 % (FLUSH) 0.9 %
10 SYRINGE (ML) INJECTION AS NEEDED
Status: DISCONTINUED | OUTPATIENT
Start: 2022-08-26 | End: 2022-08-28 | Stop reason: HOSPADM

## 2022-08-26 RX ORDER — NICARDIPINE HCL-0.9% SOD CHLOR 1 MG/10 ML
SYRINGE (ML) INTRAVENOUS AS NEEDED
Status: DISCONTINUED | OUTPATIENT
Start: 2022-08-26 | End: 2022-08-26 | Stop reason: HOSPADM

## 2022-08-26 RX ORDER — SODIUM CHLORIDE 0.9 % (FLUSH) 0.9 %
10 SYRINGE (ML) INJECTION EVERY 12 HOURS SCHEDULED
Status: DISCONTINUED | OUTPATIENT
Start: 2022-08-26 | End: 2022-08-28 | Stop reason: HOSPADM

## 2022-08-26 RX ORDER — SODIUM CHLORIDE 9 MG/ML
1 INJECTION, SOLUTION INTRAVENOUS CONTINUOUS
Status: ACTIVE | OUTPATIENT
Start: 2022-08-26 | End: 2022-08-27

## 2022-08-26 RX ORDER — CLOPIDOGREL BISULFATE 75 MG/1
75 TABLET ORAL DAILY
Status: DISCONTINUED | OUTPATIENT
Start: 2022-08-27 | End: 2022-08-28 | Stop reason: HOSPADM

## 2022-08-26 RX ORDER — CLOPIDOGREL 300 MG/1
TABLET, FILM COATED ORAL AS NEEDED
Status: DISCONTINUED | OUTPATIENT
Start: 2022-08-26 | End: 2022-08-26 | Stop reason: HOSPADM

## 2022-08-26 RX ORDER — PHENYLEPHRINE HYDROCHLORIDE 10 MG/ML
INJECTION INTRAVENOUS AS NEEDED
Status: DISCONTINUED | OUTPATIENT
Start: 2022-08-26 | End: 2022-08-26 | Stop reason: HOSPADM

## 2022-08-26 RX ORDER — LIDOCAINE HYDROCHLORIDE 10 MG/ML
INJECTION, SOLUTION EPIDURAL; INFILTRATION; INTRACAUDAL; PERINEURAL AS NEEDED
Status: DISCONTINUED | OUTPATIENT
Start: 2022-08-26 | End: 2022-08-26 | Stop reason: HOSPADM

## 2022-08-26 RX ORDER — LISINOPRIL 5 MG/1
5 TABLET ORAL DAILY
Status: DISCONTINUED | OUTPATIENT
Start: 2022-08-26 | End: 2022-08-27

## 2022-08-26 RX ORDER — DIPHENHYDRAMINE HYDROCHLORIDE 50 MG/ML
25 INJECTION INTRAMUSCULAR; INTRAVENOUS EVERY 6 HOURS PRN
Status: DISCONTINUED | OUTPATIENT
Start: 2022-08-26 | End: 2022-08-28 | Stop reason: HOSPADM

## 2022-08-26 RX ORDER — FENTANYL CITRATE 50 UG/ML
INJECTION, SOLUTION INTRAMUSCULAR; INTRAVENOUS AS NEEDED
Status: DISCONTINUED | OUTPATIENT
Start: 2022-08-26 | End: 2022-08-26 | Stop reason: HOSPADM

## 2022-08-26 RX ORDER — ACETAMINOPHEN 325 MG/1
650 TABLET ORAL EVERY 4 HOURS PRN
Status: DISCONTINUED | OUTPATIENT
Start: 2022-08-26 | End: 2022-08-28 | Stop reason: HOSPADM

## 2022-08-26 RX ORDER — ASPIRIN 81 MG/1
81 TABLET ORAL DAILY
Status: DISCONTINUED | OUTPATIENT
Start: 2022-08-26 | End: 2022-08-28 | Stop reason: HOSPADM

## 2022-08-26 RX ORDER — ROSUVASTATIN CALCIUM 20 MG/1
40 TABLET, COATED ORAL NIGHTLY
Status: DISCONTINUED | OUTPATIENT
Start: 2022-08-26 | End: 2022-08-28 | Stop reason: HOSPADM

## 2022-08-26 RX ORDER — ALPRAZOLAM 0.25 MG/1
0.25 TABLET ORAL 3 TIMES DAILY PRN
Status: DISCONTINUED | OUTPATIENT
Start: 2022-08-26 | End: 2022-08-28 | Stop reason: HOSPADM

## 2022-08-26 RX ORDER — MIDAZOLAM HYDROCHLORIDE 1 MG/ML
INJECTION INTRAMUSCULAR; INTRAVENOUS AS NEEDED
Status: DISCONTINUED | OUTPATIENT
Start: 2022-08-26 | End: 2022-08-26 | Stop reason: HOSPADM

## 2022-08-26 RX ORDER — ASPIRIN 81 MG/1
81 TABLET ORAL DAILY
Status: DISCONTINUED | OUTPATIENT
Start: 2022-08-26 | End: 2022-08-26 | Stop reason: SDUPTHER

## 2022-08-26 RX ADMIN — SODIUM CHLORIDE 1 ML/KG/HR: 9 INJECTION, SOLUTION INTRAVENOUS at 22:22

## 2022-08-26 RX ADMIN — METOPROLOL TARTRATE 25 MG: 25 TABLET, FILM COATED ORAL at 21:35

## 2022-08-26 RX ADMIN — ROSUVASTATIN CALCIUM 40 MG: 20 TABLET, FILM COATED ORAL at 21:35

## 2022-08-27 LAB
ANION GAP SERPL CALCULATED.3IONS-SCNC: 10 MMOL/L (ref 5–15)
BUN SERPL-MCNC: 16 MG/DL (ref 8–23)
BUN/CREAT SERPL: 16.3 (ref 7–25)
CALCIUM SPEC-SCNC: 8.8 MG/DL (ref 8.6–10.5)
CHLORIDE SERPL-SCNC: 102 MMOL/L (ref 98–107)
CHOLEST SERPL-MCNC: 188 MG/DL (ref 0–200)
CO2 SERPL-SCNC: 24 MMOL/L (ref 22–29)
CREAT SERPL-MCNC: 0.98 MG/DL (ref 0.76–1.27)
DEPRECATED RDW RBC AUTO: 37.2 FL (ref 37–54)
EGFRCR SERPLBLD CKD-EPI 2021: 81.9 ML/MIN/1.73
ERYTHROCYTE [DISTWIDTH] IN BLOOD BY AUTOMATED COUNT: 11.3 % (ref 12.3–15.4)
FLUAV RNA RESP QL NAA+PROBE: NOT DETECTED
FLUBV RNA RESP QL NAA+PROBE: NOT DETECTED
GLUCOSE BLDC GLUCOMTR-MCNC: 323 MG/DL (ref 70–130)
GLUCOSE BLDC GLUCOMTR-MCNC: 330 MG/DL (ref 70–130)
GLUCOSE BLDC GLUCOMTR-MCNC: 366 MG/DL (ref 70–130)
GLUCOSE SERPL-MCNC: 236 MG/DL (ref 65–99)
HBA1C MFR BLD: 10.6 % (ref 4.8–5.6)
HCT VFR BLD AUTO: 50.1 % (ref 37.5–51)
HDLC SERPL-MCNC: 42 MG/DL (ref 40–60)
HGB BLD-MCNC: 17.3 G/DL (ref 13–17.7)
LDLC SERPL CALC-MCNC: 114 MG/DL (ref 0–100)
LDLC/HDLC SERPL: 2.6 {RATIO}
MCH RBC QN AUTO: 31.1 PG (ref 26.6–33)
MCHC RBC AUTO-ENTMCNC: 34.5 G/DL (ref 31.5–35.7)
MCV RBC AUTO: 90.1 FL (ref 79–97)
PLATELET # BLD AUTO: 196 10*3/MM3 (ref 140–450)
PMV BLD AUTO: 11.9 FL (ref 6–12)
POTASSIUM SERPL-SCNC: 4.3 MMOL/L (ref 3.5–5.2)
RBC # BLD AUTO: 5.56 10*6/MM3 (ref 4.14–5.8)
SARS-COV-2 RNA RESP QL NAA+PROBE: NOT DETECTED
SODIUM SERPL-SCNC: 136 MMOL/L (ref 136–145)
TRIGL SERPL-MCNC: 185 MG/DL (ref 0–150)
TROPONIN T SERPL-MCNC: 0.72 NG/ML (ref 0–0.03)
VLDLC SERPL-MCNC: 32 MG/DL (ref 5–40)
WBC NRBC COR # BLD: 8.66 10*3/MM3 (ref 3.4–10.8)

## 2022-08-27 PROCEDURE — 83036 HEMOGLOBIN GLYCOSYLATED A1C: CPT | Performed by: INTERNAL MEDICINE

## 2022-08-27 PROCEDURE — 93010 ELECTROCARDIOGRAM REPORT: CPT | Performed by: INTERNAL MEDICINE

## 2022-08-27 PROCEDURE — 84484 ASSAY OF TROPONIN QUANT: CPT | Performed by: INTERNAL MEDICINE

## 2022-08-27 PROCEDURE — 93005 ELECTROCARDIOGRAM TRACING: CPT | Performed by: INTERNAL MEDICINE

## 2022-08-27 PROCEDURE — 85027 COMPLETE CBC AUTOMATED: CPT | Performed by: INTERNAL MEDICINE

## 2022-08-27 PROCEDURE — 63710000001 INSULIN LISPRO (HUMAN) PER 5 UNITS: Performed by: NURSE PRACTITIONER

## 2022-08-27 PROCEDURE — 63710000001 INSULIN DETEMIR PER 5 UNITS: Performed by: NURSE PRACTITIONER

## 2022-08-27 PROCEDURE — 80061 LIPID PANEL: CPT | Performed by: INTERNAL MEDICINE

## 2022-08-27 PROCEDURE — 99232 SBSQ HOSP IP/OBS MODERATE 35: CPT | Performed by: NURSE PRACTITIONER

## 2022-08-27 PROCEDURE — 82962 GLUCOSE BLOOD TEST: CPT

## 2022-08-27 PROCEDURE — 87636 SARSCOV2 & INF A&B AMP PRB: CPT | Performed by: INTERNAL MEDICINE

## 2022-08-27 PROCEDURE — 80048 BASIC METABOLIC PNL TOTAL CA: CPT | Performed by: INTERNAL MEDICINE

## 2022-08-27 PROCEDURE — 99222 1ST HOSP IP/OBS MODERATE 55: CPT | Performed by: NURSE PRACTITIONER

## 2022-08-27 RX ORDER — DEXTROSE MONOHYDRATE 25 G/50ML
25 INJECTION, SOLUTION INTRAVENOUS
Status: DISCONTINUED | OUTPATIENT
Start: 2022-08-27 | End: 2022-08-28 | Stop reason: HOSPADM

## 2022-08-27 RX ORDER — INSULIN LISPRO 100 [IU]/ML
0-7 INJECTION, SOLUTION INTRAVENOUS; SUBCUTANEOUS
Status: DISCONTINUED | OUTPATIENT
Start: 2022-08-27 | End: 2022-08-27

## 2022-08-27 RX ORDER — LISINOPRIL 2.5 MG/1
2.5 TABLET ORAL DAILY
Qty: 30 TABLET | Refills: 11 | Status: CANCELLED | OUTPATIENT
Start: 2022-08-28

## 2022-08-27 RX ORDER — INSULIN LISPRO 100 [IU]/ML
0-14 INJECTION, SOLUTION INTRAVENOUS; SUBCUTANEOUS
Status: DISCONTINUED | OUTPATIENT
Start: 2022-08-27 | End: 2022-08-28 | Stop reason: HOSPADM

## 2022-08-27 RX ORDER — NICOTINE POLACRILEX 4 MG
15 LOZENGE BUCCAL
Status: DISCONTINUED | OUTPATIENT
Start: 2022-08-27 | End: 2022-08-28 | Stop reason: HOSPADM

## 2022-08-27 RX ADMIN — INSULIN DETEMIR 10 UNITS: 100 INJECTION, SOLUTION SUBCUTANEOUS at 21:23

## 2022-08-27 RX ADMIN — SODIUM CHLORIDE 250 ML: 9 INJECTION, SOLUTION INTRAVENOUS at 14:12

## 2022-08-27 RX ADMIN — INSULIN LISPRO 3 UNITS: 100 INJECTION, SOLUTION INTRAVENOUS; SUBCUTANEOUS at 11:08

## 2022-08-27 RX ADMIN — SODIUM CHLORIDE 250 ML: 9 INJECTION, SOLUTION INTRAVENOUS at 14:41

## 2022-08-27 RX ADMIN — ROSUVASTATIN CALCIUM 40 MG: 20 TABLET, FILM COATED ORAL at 21:22

## 2022-08-27 RX ADMIN — LISINOPRIL 5 MG: 5 TABLET ORAL at 08:23

## 2022-08-27 RX ADMIN — CLOPIDOGREL BISULFATE 75 MG: 75 TABLET ORAL at 08:23

## 2022-08-27 RX ADMIN — INSULIN LISPRO 2 UNITS: 100 INJECTION, SOLUTION INTRAVENOUS; SUBCUTANEOUS at 12:39

## 2022-08-27 RX ADMIN — Medication 10 ML: at 08:27

## 2022-08-27 RX ADMIN — Medication 10 ML: at 21:22

## 2022-08-27 RX ADMIN — ASPIRIN 81 MG: 81 TABLET, COATED ORAL at 08:23

## 2022-08-27 RX ADMIN — METOPROLOL TARTRATE 25 MG: 25 TABLET, FILM COATED ORAL at 08:23

## 2022-08-27 RX ADMIN — METOPROLOL TARTRATE 25 MG: 25 TABLET, FILM COATED ORAL at 21:22

## 2022-08-27 RX ADMIN — INSULIN LISPRO 10 UNITS: 100 INJECTION, SOLUTION INTRAVENOUS; SUBCUTANEOUS at 18:11

## 2022-08-28 VITALS
SYSTOLIC BLOOD PRESSURE: 116 MMHG | TEMPERATURE: 98.5 F | DIASTOLIC BLOOD PRESSURE: 70 MMHG | HEIGHT: 74 IN | OXYGEN SATURATION: 100 % | HEART RATE: 72 BPM | RESPIRATION RATE: 16 BRPM | BODY MASS INDEX: 20.17 KG/M2 | WEIGHT: 157.19 LBS

## 2022-08-28 LAB
GLUCOSE BLDC GLUCOMTR-MCNC: 161 MG/DL (ref 70–130)
GLUCOSE BLDC GLUCOMTR-MCNC: 234 MG/DL (ref 70–130)

## 2022-08-28 PROCEDURE — 99238 HOSP IP/OBS DSCHRG MGMT 30/<: CPT | Performed by: INTERNAL MEDICINE

## 2022-08-28 PROCEDURE — 63710000001 INSULIN LISPRO (HUMAN) PER 5 UNITS: Performed by: NURSE PRACTITIONER

## 2022-08-28 PROCEDURE — 97161 PT EVAL LOW COMPLEX 20 MIN: CPT

## 2022-08-28 PROCEDURE — 82962 GLUCOSE BLOOD TEST: CPT

## 2022-08-28 RX ORDER — ROSUVASTATIN CALCIUM 40 MG/1
40 TABLET, COATED ORAL NIGHTLY
Qty: 90 TABLET | Refills: 11 | Status: SHIPPED | OUTPATIENT
Start: 2022-08-28

## 2022-08-28 RX ORDER — CLOPIDOGREL BISULFATE 75 MG/1
75 TABLET ORAL DAILY
Qty: 30 TABLET | Refills: 11 | Status: SHIPPED | OUTPATIENT
Start: 2022-08-28

## 2022-08-28 RX ADMIN — CLOPIDOGREL BISULFATE 75 MG: 75 TABLET ORAL at 09:06

## 2022-08-28 RX ADMIN — Medication 10 ML: at 09:06

## 2022-08-28 RX ADMIN — INSULIN LISPRO 3 UNITS: 100 INJECTION, SOLUTION INTRAVENOUS; SUBCUTANEOUS at 09:06

## 2022-08-28 RX ADMIN — INSULIN LISPRO 5 UNITS: 100 INJECTION, SOLUTION INTRAVENOUS; SUBCUTANEOUS at 13:36

## 2022-08-28 RX ADMIN — ASPIRIN 81 MG: 81 TABLET, COATED ORAL at 09:06

## 2022-08-29 ENCOUNTER — READMISSION MANAGEMENT (OUTPATIENT)
Dept: CALL CENTER | Facility: HOSPITAL | Age: 72
End: 2022-08-29

## 2022-08-29 NOTE — OUTREACH NOTE
Prep Survey    Flowsheet Row Responses   [facility] patient discharged from? [facility]   Is LACE score < 7 ? No   Emergency Room discharge w/ pulse ox? No   Eligibility Readm Mgmt   Discharge diagnosis NSTEMI   Does the patient have one of the following disease processes/diagnoses(primary or secondary)? Acute MI (STEMI,NSTEMI)   Does the patient have Home health ordered? No   Is there a DME ordered? No   Prep survey completed? Yes          NAYELY MOJICA - Registered Nurse

## 2022-08-30 ENCOUNTER — DOCUMENTATION (OUTPATIENT)
Dept: CARDIAC REHAB | Facility: HOSPITAL | Age: 72
End: 2022-08-30

## 2022-08-30 NOTE — PROGRESS NOTES
Cardiac Rehab staff mailed referral letter to patient regarding Phase II Cardiac Rehab program. Instruction for patient to contact [facility] Cardiac Rehab Department for additional program information and to forward referral to closest facility.

## 2022-08-31 ENCOUNTER — READMISSION MANAGEMENT (OUTPATIENT)
Dept: CALL CENTER | Facility: HOSPITAL | Age: 72
End: 2022-08-31

## 2022-09-06 ENCOUNTER — READMISSION MANAGEMENT (OUTPATIENT)
Dept: CALL CENTER | Facility: HOSPITAL | Age: 72
End: 2022-09-06

## 2022-09-06 NOTE — OUTREACH NOTE
AMI Week 1 Survey    Flowsheet Row Responses   [facility] patient discharged from? [facility]   Does the patient have one of the following disease processes/diagnoses(primary or secondary)? Acute MI (STEMI,NSTEMI)   Week 1 attempt successful? Yes   Call start time 1530   Call end time 1536   Discharge diagnosis NSTEMI   Meds reviewed with patient/caregiver? Yes   Is the patient having any side effects they believe may be caused by any medication additions or changes? No   Does the patient have all prescriptions related to this admission filled (includes statins,anticoagulants,HTN meds,anti-arrhythmia meds) Yes   Is the patient taking all medications as directed (includes completed medication regime)? Yes   Does the patient have a primary care provider?  Yes   Does the patient have an appointment with their PCP,cardiologist,or clinic within 7 days of discharge? Yes   Has the patient kept scheduled appointments due by today? Yes   Has home health visited the patient within 72 hours of discharge? N/A   Psychosocial issues? No   Did the patient receive a copy of their discharge instructions? Yes   Nursing interventions Reviewed instructions with patient   What is the patient's perception of their health status since discharge? Improving   Nursing interventions Nurse provided patient education   Is the patient/caregiver able to teach back signs and symptoms of when to call for help immediately: Sudden chest discomfort, Sudden discomfort in arms, back, neck or jaw, Shortness of breath at any time, Sudden sweating or clammy skin, Nausea or vomiting, Dizziness or lightheadedness, Irregular or rapid heart rate   Nursing interventions Nurse provided patient education   Is the pateint /caregiver able to teach back the importance of cardiac rehab? Yes   Is the patient/caregiver able to teach back lifestyle changes to help prevent MIs Heart healthy diet, Reducing stress, Maintaining a healthy weight, Regular exercise as  approved by provider   Is the patient/caregiver able to teach back ways to prevent a second heart attack: Take medications, Follow up with MD, Participate in Cardiac Rehab, Manage risk factors   If the patient is a current smoker, are they able to teach back resources for cessation? Not a smoker   Is the patient/caregiver able to teach back the hierarchy of who to call/visit for symptoms/problems? PCP, Specialist, Home health nurse, Urgent Care, ED, 911 Yes   Additional teach back comments states is working on changing diet to low sodium, low fat   Week 1 call completed? Yes          DWAINE NIEVES - Registered Nurse

## 2022-09-09 LAB
QT INTERVAL: 392 MS
QT INTERVAL: 468 MS
QT INTERVAL: 470 MS
QT INTERVAL: 478 MS
QT INTERVAL: 494 MS
QTC INTERVAL: 457 MS
QTC INTERVAL: 459 MS
QTC INTERVAL: 471 MS
QTC INTERVAL: 493 MS
QTC INTERVAL: 501 MS

## 2022-10-04 ENCOUNTER — OFFICE VISIT (OUTPATIENT)
Dept: CARDIOLOGY | Facility: CLINIC | Age: 72
End: 2022-10-04

## 2022-10-04 VITALS
SYSTOLIC BLOOD PRESSURE: 118 MMHG | DIASTOLIC BLOOD PRESSURE: 64 MMHG | WEIGHT: 171.6 LBS | BODY MASS INDEX: 22.02 KG/M2 | HEIGHT: 74 IN | OXYGEN SATURATION: 99 % | HEART RATE: 58 BPM

## 2022-10-04 DIAGNOSIS — I10 PRIMARY HYPERTENSION: ICD-10-CM

## 2022-10-04 DIAGNOSIS — E78.2 MIXED HYPERLIPIDEMIA: ICD-10-CM

## 2022-10-04 DIAGNOSIS — I25.10 CORONARY ARTERY DISEASE INVOLVING NATIVE CORONARY ARTERY OF NATIVE HEART WITHOUT ANGINA PECTORIS: Primary | ICD-10-CM

## 2022-10-04 PROCEDURE — 99214 OFFICE O/P EST MOD 30 MIN: CPT | Performed by: INTERNAL MEDICINE

## 2022-10-04 NOTE — PROGRESS NOTES
[facility] Card[facility]  Office Progress Note  Giorgio Omer  1950  [address] [address]       Visit Date: 10/04/22    PCP: Lurdes Estrada MD  [address] [place] [address]    IDENTIFICATION: A 71 y.o. 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    PROBLEM LIST:   1.  Coronary artery disease:  a. Surgical revascularization,  Dr. Bryant, 07/01, [facility], receiving LIMA to LAD, free radial graft to PDA, saphenous graft to first diagonal, saphenous graft to obtuse marginal.  b. 7/24/19 MPS exercise cardiolite wnl  c. 8/22 LHC per TM 4.0X 38 Xience to svg- D  patent lima/lad, radial to PDA. 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 Diabetes mellitus, on Metformin  times one year, onset 2009:  March 2015.  HG A1c 8.6.   3. 8/22 10.6  4.  Dyslipidemia:  a. March 2015:  Total cholesterol 143, triglycerides  326, HDL 39, LDL 38.  b. 8/22 188/185/42/114  5. Hypertension, presumed essential.   6. CVD - 4/17 CUS wnl  7.  Baseline right bundle-branch block.   8. Left Lower Extremity Edema  1. US LLE: 2016 Partial DVT      CC:   Chief Complaint   Patient presents with   • Coronary artery disease involving native coronary artery of       Allergies  Allergies   Allergen Reactions   • Cardizem [Diltiazem Hcl]    • Celecoxib Itching       Current Medications    Current Outpatient Medications:   •  aspirin 81 MG tablet, Take 81 mg by mouth Daily., Disp: , Rfl:   •  clopidogrel (PLAVIX) 75 MG tablet, Take 1 tablet by mouth Daily., Disp: 30 tablet, Rfl: 11  •  insulin aspart (novoLOG) 100 UNIT/ML injection, Inject  under the skin into the appropriate area as directed 3 (Three) Times a Day Before Meals., Disp: , Rfl:   •  insulin detemir (LEVEMIR) 100 UNIT/ML injection, Inject 12 Units under the skin into the appropriate area as directed Daily. Dispense with needles (Patient taking differently:  "Inject 10 Units under the skin into the appropriate area as directed Daily. Dispense with needles), Disp: 10 mL, Rfl: 0  •  Liraglutide (VICTOZA SC), Inject  under the skin into the appropriate area as directed., Disp: , Rfl:   •  metFORMIN (GLUCOPHAGE) 1000 MG tablet, Take 1,000 mg by mouth 2 (Two) Times a Day With Meals., Disp: , Rfl:   •  metoprolol succinate XL (TOPROL-XL) 50 MG 24 hr tablet, Take 25 mg by mouth Daily., Disp: , Rfl:   •  nitroglycerin (NITROSTAT) 0.4 MG SL tablet, Place 0.4 mg under the tongue Every 5 (Five) Minutes As Needed for Chest Pain. Take no more than 3 doses in 15 minutes., Disp: , Rfl:   •  rosuvastatin (CRESTOR) 40 MG tablet, Take 1 tablet by mouth Every Night., Disp: 90 tablet, Rfl: 11      History of Present Illness   Giorgio Omer is a 71 y.o. year old male here for follow up.    He is fared well post his most recent PCI and stenting.  He is back on his medications and has no cardiac complaints.  He is again planning to go to [facility] this winter for crappy fishing    OBJECTIVE:  Vitals:    10/04/22 1310   BP: 118/64   BP Location: Left arm   Patient Position: Sitting   Pulse: 58   SpO2: 99%   Weight: 77.8 kg (171 lb 9.6 oz)   Height: 188 cm (74\")     Body mass index is 22.03 kg/m².    Vitals reviewed.   Constitutional:       General: Awake.      Appearance: Healthy appearance. Well-developed. Cachectic.   Neck:      Thyroid: No thyromegaly.      Vascular: No carotid bruit or JVD.      Trachea: No tracheal deviation.   Pulmonary:      Effort: Pulmonary effort is normal. No respiratory distress.      Breath sounds: Normal breath sounds. No wheezing. No rales.   Cardiovascular:      Normal rate. Regular rhythm. Normal S1. Normal S2.      Murmurs: There is no murmur.      No gallop. No click. No rub.   Pulses:     Intact distal pulses.   Edema:     Peripheral edema absent.   Abdominal:      General: Bowel sounds are normal.      Palpations: Abdomen is soft. There is no " abdominal mass.      Tenderness: There is no abdominal tenderness. There is no guarding.   Musculoskeletal:      Cervical back: Normal range of motion and neck supple. Skin:     General: Skin is warm and dry.   Neurological:      Mental Status: Alert, oriented to person, place, and time and oriented to person, place and time.   Psychiatric:         Attention and Perception: Attention normal.         Mood and Affect: Mood normal.         Speech: Speech normal.         Behavior: Behavior normal. Behavior is cooperative.         Diagnostic Data:  Procedures      ASSESSMENT:   Diagnosis Plan   1. Coronary artery disease involving native coronary artery of native heart without angina pectoris     2. Primary hypertension     3. Mixed hyperlipidemia         PLAN:  1.  CAD post recent PCI and stenting remote surgical revascularization continue GDMT  2.  Today the patient does not relay any anginal nor atypical anginal symptoms.  3. Hypertension well-controlled patient is not on any antihypertensive.  4.  [facility] for further assessment of diabetes mellitus and dyslipidemia.   5.  Right hip pain -referred to orthopedics for cortisone injections in right trochanteric bursa.  Unfortunately he has not had significant relief with such     Dr. Dionicio Trevino M.D. [facility]

## 2023-01-16 ENCOUNTER — OFFICE VISIT (OUTPATIENT)
Dept: UROLOGY | Facility: CLINIC | Age: 73
End: 2023-01-16
Payer: MEDICARE

## 2023-01-16 VITALS — WEIGHT: 171 LBS | HEIGHT: 74 IN | BODY MASS INDEX: 21.94 KG/M2

## 2023-01-16 DIAGNOSIS — N40.1 BPH WITH OBSTRUCTION/LOWER URINARY TRACT SYMPTOMS: ICD-10-CM

## 2023-01-16 DIAGNOSIS — Q24.9 HEART ABNORMALITY: ICD-10-CM

## 2023-01-16 DIAGNOSIS — N47.1 PHIMOSIS: Primary | ICD-10-CM

## 2023-01-16 DIAGNOSIS — N48.6 PEYRONIE DISEASE: ICD-10-CM

## 2023-01-16 DIAGNOSIS — N52.9 ERECTILE DYSFUNCTION, UNSPECIFIED ERECTILE DYSFUNCTION TYPE: ICD-10-CM

## 2023-01-16 DIAGNOSIS — N13.8 BPH WITH OBSTRUCTION/LOWER URINARY TRACT SYMPTOMS: ICD-10-CM

## 2023-01-16 PROCEDURE — 99204 OFFICE O/P NEW MOD 45 MIN: CPT

## 2023-01-16 RX ORDER — GENTAMICIN SULFATE 80 MG/100ML
80 INJECTION, SOLUTION INTRAVENOUS ONCE
Status: CANCELLED | OUTPATIENT
Start: 2023-01-16 | End: 2023-01-16

## 2023-01-16 RX ORDER — TAMSULOSIN HYDROCHLORIDE 0.4 MG/1
1 CAPSULE ORAL NIGHTLY
Qty: 30 CAPSULE | Refills: 5 | Status: SHIPPED | OUTPATIENT
Start: 2023-01-16 | End: 2023-01-16 | Stop reason: SDUPTHER

## 2023-01-16 RX ORDER — TAMSULOSIN HYDROCHLORIDE 0.4 MG/1
1 CAPSULE ORAL NIGHTLY
Qty: 30 CAPSULE | Refills: 5 | Status: SHIPPED | OUTPATIENT
Start: 2023-01-16

## 2023-01-16 NOTE — PROGRESS NOTES
"Chief Complaint:    Chief Complaint   Patient presents with   • Difficulty Urinating       Vital Signs:   Ht 188 cm (74.02\")   Wt 77.6 kg (171 lb)   BMI 21.95 kg/m²   Body mass index is 21.95 kg/m².      HPI:  Giorgio Omer is a 72 y.o. male who presents today for initial evaluation     History of Present Illness  Mr. Omer presents to the clinic for initial evaluation of difficulty urinating and erectile dysfunction.  He has a medical history significant for dyslipidemia, coronary artery disease, hypertension, and type 2 diabetes mellitus.  He has a medical history for CABG. He currently takes Plavix 75 mg once daily with a baby aspirin.  He reports that his diabetes has been uncontrolled for the past 2 years.  States he currently recently started taking medication to improve A1c.  His last A1c on file was in August 2022 and roughly 10.5.  Denies any history of prostate problems in the past.  Denies any family history of prostate cancer.  He states that his last PSA was normal.  He was previously seen by Dr. Espinoza in 2019 for Peyronie's disease and ED.  He has tried Viagra and Cialis in the past with minimal benefit.  He did have some improvement with erections with Viagra however had significant headaches so stopped the medication.  At his previous visit with Dr. Espinoza he had discussed undergoing penile injections versus an implant.  Patient wanted to discuss this with his current wife before starting medication.  States today that he has an increase in difficulty urinating.  He does not have an erection in roughly 1 year.  On physical exam today there is a significant phimosis noted.  I discussed with the patient circumcision to help treat phimosis.  I also discussed the use of penile injections versus implants however I am not recommending this at this time given inability to retract foreskin appropriately.      Past Medical History:  Past Medical History:   Diagnosis Date   • Coronary artery disease " 10/17/2016    Surgical revascularization, Dr. Bryant, 07/01, [facility], receiving LIMA to LAD, free radial graft to PDA, saphenous graft to first diagonal, saphenous graft to obtuse marginal. MPS, May 2008, per [facility]: Normal perfusion, EF 68%.  April 2012 - exercise Cardiolite WNL, EF preserved.   • Diabetes mellitus (HCC) 10/17/2016    on Metformin times one year, onset 2009:  March 2015.  HG A1c 8.6.    • Dyslipidemia 10/17/2016    March 2015:  Total cholesterol 143, triglycerides 326, HDL 39, LDL 38.   • Hypertension 10/17/2016    presumed essential.    • Right bundle branch block 10/17/2016    baseline       Current Meds:  Current Outpatient Medications   Medication Sig Dispense Refill   • tamsulosin (Flomax) 0.4 MG capsule 24 hr capsule Take 1 capsule by mouth Every Night. 30 capsule 5   • aspirin 81 MG tablet Take 81 mg by mouth Daily.     • clopidogrel (PLAVIX) 75 MG tablet Take 1 tablet by mouth Daily. 30 tablet 11   • insulin aspart (novoLOG) 100 UNIT/ML injection Inject  under the skin into the appropriate area as directed 3 (Three) Times a Day Before Meals.     • insulin detemir (LEVEMIR) 100 UNIT/ML injection Inject 12 Units under the skin into the appropriate area as directed Daily. Dispense with needles (Patient taking differently: Inject 10 Units under the skin into the appropriate area as directed Daily. Dispense with needles) 10 mL 0   • Liraglutide (VICTOZA SC) Inject  under the skin into the appropriate area as directed.     • metFORMIN (GLUCOPHAGE) 1000 MG tablet Take 1,000 mg by mouth 2 (Two) Times a Day With Meals.     • metoprolol succinate XL (TOPROL-XL) 50 MG 24 hr tablet Take 25 mg by mouth Daily.     • nitroglycerin (NITROSTAT) 0.4 MG SL tablet Place 0.4 mg under the tongue Every 5 (Five) Minutes As Needed for Chest Pain. Take no more than 3 doses in 15 minutes.     • rosuvastatin (CRESTOR) 40 MG tablet Take 1 tablet by mouth Every Night. 90 tablet 11     No current facility-administered  medications for this visit.        Allergies:   Allergies   Allergen Reactions   • Cardizem [Diltiazem Hcl]    • Celecoxib Itching        Past Surgical History:  Past Surgical History:   Procedure Laterality Date   • CARDIAC CATHETERIZATION N/A 2022    Procedure: LEFT HEART CATH;  Surgeon: Sriram Bowers MD;  Location: [facility] CATH INVASIVE LOC[facility];  Service: Cardiovascular;  Laterality: N/A;   • CORONARY ARTERY BYPASS GRAFT         Social History:  Social History     Socioeconomic History   • Marital status:    Tobacco Use   • Smoking status: Former     Types: Cigarettes     Quit date: 10/28/2001     Years since quittin.2   • Smokeless tobacco: Never   Vaping Use   • Vaping Use: Never used   Substance and Sexual Activity   • Alcohol use: No   • Drug use: No   • Sexual activity: Defer       Family History:  Family History   Problem Relation Age of Onset   • Ovarian cancer Mother    • Hypertension Father    • Stroke Father        Review of Systems:  Review of Systems   Constitutional: Negative for fatigue, fever and unexpected weight change.   Respiratory: Negative for chest tightness and shortness of breath.    Cardiovascular: Negative for chest pain.   Gastrointestinal: Negative for abdominal pain, constipation, diarrhea, nausea and vomiting.   Genitourinary: Positive for difficulty urinating, penile pain and penile swelling. Negative for dysuria, frequency, hematuria and urgency.   Skin: Negative for rash.   Psychiatric/Behavioral: Negative for confusion and suicidal ideas.       Physical Exam:  Physical Exam  Constitutional:       General: He is not in acute distress.     Appearance: Normal appearance.   HENT:      Head: Normocephalic and atraumatic.      Nose: Nose normal.      Mouth/Throat:      Mouth: Mucous membranes are moist.   Eyes:      Conjunctiva/sclera: Conjunctivae normal.   Cardiovascular:      Rate and Rhythm: Normal rate and regular rhythm.      Pulses: Normal pulses.      Heart  sounds: Normal heart sounds.   Pulmonary:      Effort: Pulmonary effort is normal.      Breath sounds: Normal breath sounds.   Abdominal:      General: Bowel sounds are normal.      Palpations: Abdomen is soft.      Tenderness: There is no right CVA tenderness or left CVA tenderness.   Genitourinary:     Comments: Phimosis  Musculoskeletal:         General: Normal range of motion.      Cervical back: Normal range of motion.   Skin:     General: Skin is warm.   Neurological:      General: No focal deficit present.      Mental Status: He is alert and oriented to person, place, and time.   Psychiatric:         Mood and Affect: Mood normal.         Behavior: Behavior normal.         Thought Content: Thought content normal.         Judgment: Judgment normal.         IPSS Questionnaire (AUA-7):  IPSS Questionnaire (AUA-7):                  IPSS Questionnaire (AUA-7):  Over the past month…    1)  Incomplete Emptying  How often have you had a sensation of not emptying your bladder?  3 - About half the time   2)  Frequency  How often have you had to urinate less than every two hours? 4 - More than half the time   3)  Intermittency  How often have you found you stopped and started again several times when you urinated?  5 - Almost always   4) Urgency  How often have you found it difficult to postpone urination?  5 - Almost always   5) Weak Stream  How often have you had a weak urinary stream?  5 - Almost always   6) Straining  How often have you had to push or strain to begin urination?  0 - Not at all   7) Nocturia  How many times did you typically get up at night to urinate?  3 - 3 times   Total Score:  25   The International Prostate Symptom Score (IPSS) is used to screen, diagnose, track symptoms of benign prostatic hyperplasia (BPH).    0-7 pts (Mild Symptoms)  / 8-19 pts (Moderate) / 20-35 (Severe)    Quality of life due to urinary symptoms:  If you were to spend the rest of your life with your urinary condition the way  it is now, how would you feel about that? 6-Terrible   Urine Leakage (Incontinence) 0-No Leakage       Recent Image (CT and/or KUB):   CT Abdomen and Pelvis: No results found for this or any previous visit.     CT Stone Protocol: No results found for this or any previous visit.     KUB: No results found for this or any previous visit.       Labs:  Brief Urine Lab Results     None        No visits with results within 3 Month(s) from this visit.   Latest known visit with results is:   Admission on 08/26/2022, Discharged on 08/28/2022   Component Date Value Ref Range Status   • Glucose 08/26/2022 144 (H)  70 - 130 mg/dL Final    Meter: [id number] : [id number] Ulysses Palumbo   • QT Interval 08/26/2022 478  ms Final   • QTC Interval 08/26/2022 493  ms Final   • Cath EF Estimated 08/26/2022 55  % Final   • QT Interval 08/26/2022 392  ms Final   • QTC Interval 08/26/2022 471  ms Final   • QT Interval 08/27/2022 470  ms Final   • QTC Interval 08/27/2022 457  ms Final   • Troponin T 08/27/2022 0.716 (C)  0.000 - 0.030 ng/mL Final   • Glucose 08/27/2022 236 (H)  65 - 99 mg/dL Final   • BUN 08/27/2022 16  8 - 23 mg/dL Final   • Creatinine 08/27/2022 0.98  0.76 - 1.27 mg/dL Final   • Sodium 08/27/2022 136  136 - 145 mmol/L Final   • Potassium 08/27/2022 4.3  3.5 - 5.2 mmol/L Final    Slight hemolysis detected by analyzer. Results may be affected.   • Chloride 08/27/2022 102  98 - 107 mmol/L Final   • CO2 08/27/2022 24.0  22.0 - 29.0 mmol/L Final   • Calcium 08/27/2022 8.8  8.6 - 10.5 mg/dL Final   • BUN/Creatinine Ratio 08/27/2022 16.3  7.0 - 25.0 Final   • Anion Gap 08/27/2022 10.0  5.0 - 15.0 mmol/L Final   • eGFR 08/27/2022 81.9  >60.0 mL/min/1.73 Final    National Kidney Foundation and American Society of Nephrology (ASN) Task Force recommended calculation based on the Chronic Kidney Disease Epidemiology Collaboration (CKD-EPI) equation refit without adjustment for race.   • Total Cholesterol 08/27/2022 188  0 - 200  mg/dL Final   • Triglycerides 08/27/2022 185 (H)  0 - 150 mg/dL Final   • HDL Cholesterol 08/27/2022 42  40 - 60 mg/dL Final   • LDL Cholesterol  08/27/2022 114 (H)  0 - 100 mg/dL Final   • VLDL Cholesterol 08/27/2022 32  5 - 40 mg/dL Final   • LDL/HDL Ratio 08/27/2022 2.60   Final   • Hemoglobin A1C 08/27/2022 10.60 (H)  4.80 - 5.60 % Final   • WBC 08/27/2022 8.66  3.40 - 10.80 10*3/mm3 Final   • RBC 08/27/2022 5.56  4.14 - 5.80 10*6/mm3 Final   • Hemoglobin 08/27/2022 17.3  13.0 - 17.7 g/dL Final   • Hematocrit 08/27/2022 50.1  37.5 - 51.0 % Final   • MCV 08/27/2022 90.1  79.0 - 97.0 fL Final   • MCH 08/27/2022 31.1  26.6 - 33.0 pg Final   • MCHC 08/27/2022 34.5  31.5 - 35.7 g/dL Final   • RDW 08/27/2022 11.3 (L)  12.3 - 15.4 % Final   • RDW-SD 08/27/2022 37.2  37.0 - 54.0 fl Final   • MPV 08/27/2022 11.9  6.0 - 12.0 fL Final   • Platelets 08/27/2022 196  140 - 450 10*3/mm3 Final   • QT Interval 08/27/2022 494  ms Final   • QTC Interval 08/27/2022 501  ms Final   • QT Interval 08/27/2022 468  ms Final   • QTC Interval 08/27/2022 459  ms Final   • Glucose 08/27/2022 323 (H)  70 - 130 mg/dL Final    Meter: [id number] : [id number] Saw Choi   • Glucose 08/27/2022 330 (H)  70 - 130 mg/dL Final    Meter: [id number] : [id number] Evan Summers   • COVID19 08/27/2022 Not Detected  Not Detected - Ref. Range Final   • Influenza A PCR 08/27/2022 Not Detected  Not Detected Final   • Influenza B PCR 08/27/2022 Not Detected  Not Detected Final   • Glucose 08/27/2022 366 (H)  70 - 130 mg/dL Final    Meter: [id number] : [id number] Osei Ashton   • Glucose 08/28/2022 161 (H)  70 - 130 mg/dL Final    Meter: [id number] : [id number] Karlos Philip   • Glucose 08/28/2022 234 (H)  70 - 130 mg/dL Final    Meter: [id number] : [id number] Karlos Philip        Procedure: None  Procedures     I have reviewed and agree with the above PMH, PSH, FMH, social history, medications, allergies, and labs.      Assessment/Plan:   Problem List Items Addressed This Visit        Genitourinary and Reproductive     Peyronie disease    Phimosis - Primary    Overview     Added automatically from request for surgery [id number]         Relevant Orders    Case Request (Completed)    CBC (No Diff)    Basic Metabolic Panel   Other Visit Diagnoses     BPH with obstruction/lower urinary tract symptoms        Relevant Medications    tamsulosin (Flomax) 0.4 MG capsule 24 hr capsule    Heart abnormality        Erectile dysfunction, unspecified erectile dysfunction type              Health Maintenance:   Health Maintenance Due   Topic Date Due   • URINE MICROALBUMIN  Never done   • COVID-19 Vaccine (1) Never done   • Pneumococcal Vaccine 65+ (1 - PCV) Never done   • TDAP/TD VACCINES (1 - Tdap) Never done   • ZOSTER VACCINE (1 of 2) Never done   • HEPATITIS C SCREENING  Never done   • DIABETIC FOOT EXAM  Never done   • DIABETIC EYE EXAM  Never done   • ANNUAL WELLNESS VISIT  05/20/2022   • INFLUENZA VACCINE  08/01/2022        Smoking Counseling: Former smoker quit in 2001.  Never used smokeless tobacco.    Urine Incontinence: Patient reports that he is not currently experiencing any symptoms of urinary incontinence.    Patient was given instructions and counseling regarding his condition or for health maintenance advice. Please see specific information pulled into the AVS if appropriate.    Patient Education:   Phimosis -discussed with the patient pathophysiology of the phimosis.  I discussed treatment for phimosis including conservative methods such as Lotrisone cream versus surgery such as circumcision.  Also discussed the use of a dorsal slit.  This time I am recommending a circumcision.  Patient is currently on anticoagulation daily.  Advised him that he will need to stop anticoagulation 5 to 7 days prior to procedure.  He will need to be cleared by his cardiologist first.  Discussed the risks and benefits of a circumcision.  Advised  patient that he may experience bleeding, bruising, swelling, infection, or pain.  Patient verbalizes understanding and agrees to proceed forward with a circumcision. I will go ahead and schedule him for a circumcision with Dr. Espinoza on 2/6/2023.  Benign Prostate Hypertrophy (BPH): Discussed the pathophysiology of BPH and obstruction.  We discussed the static and dynamic effects of BPH as well as using 5 alpha reductase inhibitors versus alpha blockade.  We discussed the indications for transurethral surgery as well and/ or other therapeutic options available including all of the newer techniques.  Patient has never been on any medications for his prostate.  I am recommending Flomax once daily to help with urinary symptoms.  Discussed the risk and side effects of this medication.  Advised patient to discontinue medication if he has significant syncope, lightheadedness, dizziness, blurry vision, chest pain, or shortness of breath.  Patient verbalizes understanding.  Peyronie's disease - we discussed the pathophysiology of this at length.  I went ahead and anand a diagram showing the disease status and how it has about an 18% association with a congenital condition called Dupuytren's contracture which is very much an inherited disease but is seen in men at the average age in the 50s and is associated many times with diabetes.  However the vast majority of this disease is related to penile trauma especially in the mid 50s when testosterone levels are dropping and a rockhard erection is not an option causing bending at the flexion point of the penis.  We discussed treatment options including the use of xiaflex, which is clostridial collagenase causing a dissolution of the plaque.  This is especially useful single bend however we also discussed penile traction devices vacuum pumps and the use of low-dose PDE-5 inhibitors have been effective.   Erectile dysfunction -I discussed with the patient the pathophysiology of  erectile dysfunction.  Patient has tried PDE 5 inhibitors in the past with no benefit.  I discussed with him the use of penile injections versus penile implants.  Advised patient that penile injections may minimally help given current history of Peyronie's disease and phimosis.  Advised circumcision to be completed prior to the start of injections.  Patient is in agreements with this and verbalizes understanding.    Visit Diagnoses:    ICD-10-CM ICD-9-CM   1. Phimosis  N47.1 605   2. BPH with obstruction/lower urinary tract symptoms  N40.1 600.01    N13.8 599.69   3. Heart abnormality  Q24.9 746.9   4. Peyronie disease  N48.6 607.85   5. Erectile dysfunction, unspecified erectile dysfunction type  N52.9 607.84       Meds Ordered During Visit:  New Medications Ordered This Visit   Medications   • tamsulosin (Flomax) 0.4 MG capsule 24 hr capsule     Sig: Take 1 capsule by mouth Every Night.     Dispense:  30 capsule     Refill:  5       Follow Up Appointment: Circumcision with Dr. Espinoza pending cardiology approval  No follow-ups on file.      This document has been electronically signed by Silvio Roblero PA-C   January 16, 2023 15:49 EST    Part of this note may be an electronic transcription/translation of spoken language to printed text using the Dragon Dictation System.

## 2023-02-03 NOTE — DISCHARGE INSTRUCTIONS

## 2023-02-06 ENCOUNTER — PRE-ADMISSION TESTING (OUTPATIENT)
Dept: PREADMISSION TESTING | Facility: HOSPITAL | Age: 73
End: 2023-02-06
Payer: MEDICARE

## 2023-02-06 DIAGNOSIS — N47.1 PHIMOSIS: ICD-10-CM

## 2023-02-06 LAB
ANION GAP SERPL CALCULATED.3IONS-SCNC: 10.5 MMOL/L (ref 5–15)
BUN SERPL-MCNC: 14 MG/DL (ref 8–23)
BUN/CREAT SERPL: 13.1 (ref 7–25)
CALCIUM SPEC-SCNC: 9.2 MG/DL (ref 8.6–10.5)
CHLORIDE SERPL-SCNC: 98 MMOL/L (ref 98–107)
CO2 SERPL-SCNC: 25.5 MMOL/L (ref 22–29)
CREAT SERPL-MCNC: 1.07 MG/DL (ref 0.76–1.27)
DEPRECATED RDW RBC AUTO: 37.1 FL (ref 37–54)
EGFRCR SERPLBLD CKD-EPI 2021: 73.3 ML/MIN/1.73
ERYTHROCYTE [DISTWIDTH] IN BLOOD BY AUTOMATED COUNT: 11.7 % (ref 12.3–15.4)
GLUCOSE SERPL-MCNC: 505 MG/DL (ref 65–99)
HCT VFR BLD AUTO: 45.3 % (ref 37.5–51)
HGB BLD-MCNC: 15.5 G/DL (ref 13–17.7)
MCH RBC QN AUTO: 30.2 PG (ref 26.6–33)
MCHC RBC AUTO-ENTMCNC: 34.2 G/DL (ref 31.5–35.7)
MCV RBC AUTO: 88.3 FL (ref 79–97)
PLATELET # BLD AUTO: 166 10*3/MM3 (ref 140–450)
PMV BLD AUTO: 12.7 FL (ref 6–12)
POTASSIUM SERPL-SCNC: 4.7 MMOL/L (ref 3.5–5.2)
RBC # BLD AUTO: 5.13 10*6/MM3 (ref 4.14–5.8)
SODIUM SERPL-SCNC: 134 MMOL/L (ref 136–145)
WBC NRBC COR # BLD: 6.29 10*3/MM3 (ref 3.4–10.8)

## 2023-02-06 PROCEDURE — 85027 COMPLETE CBC AUTOMATED: CPT

## 2023-02-06 PROCEDURE — 36415 COLL VENOUS BLD VENIPUNCTURE: CPT

## 2023-02-06 PROCEDURE — 80048 BASIC METABOLIC PNL TOTAL CA: CPT

## 2023-02-08 ENCOUNTER — TELEPHONE (OUTPATIENT)
Dept: UROLOGY | Facility: CLINIC | Age: 73
End: 2023-02-08
Payer: MEDICARE

## 2023-02-08 NOTE — TELEPHONE ENCOUNTER
Called patient to inform him of his most recent increased glucose on CMP.  Advised him to continue to monitor his glucose 2-3 times daily.  Patient states that has come down and he is doing okay at this time.  Advised him to go to the ER if it continues to stay high.  Patient verbalized understanding.

## 2023-02-27 ENCOUNTER — HOSPITAL ENCOUNTER (OUTPATIENT)
Facility: HOSPITAL | Age: 73
Setting detail: HOSPITAL OUTPATIENT SURGERY
Discharge: HOME | End: 2023-02-27
Attending: UROLOGY | Admitting: UROLOGY
Payer: MEDICARE

## 2023-02-27 ENCOUNTER — ANESTHESIA EVENT (OUTPATIENT)
Dept: PERIOP | Facility: HOSPITAL | Age: 73
End: 2023-02-27
Payer: MEDICARE

## 2023-02-27 ENCOUNTER — ANESTHESIA (OUTPATIENT)
Dept: PERIOP | Facility: HOSPITAL | Age: 73
End: 2023-02-27
Payer: MEDICARE

## 2023-02-27 VITALS
BODY MASS INDEX: 22.28 KG/M2 | OXYGEN SATURATION: 97 % | RESPIRATION RATE: 14 BRPM | DIASTOLIC BLOOD PRESSURE: 72 MMHG | HEART RATE: 72 BPM | TEMPERATURE: 97.8 F | HEIGHT: 74 IN | SYSTOLIC BLOOD PRESSURE: 115 MMHG | WEIGHT: 173.6 LBS

## 2023-02-27 DIAGNOSIS — N47.1 PHIMOSIS: ICD-10-CM

## 2023-02-27 LAB — GLUCOSE BLDC GLUCOMTR-MCNC: 201 MG/DL (ref 70–130)

## 2023-02-27 PROCEDURE — 25010000002 ONDANSETRON PER 1 MG: Performed by: NURSE ANESTHETIST, CERTIFIED REGISTERED

## 2023-02-27 PROCEDURE — 88304 TISSUE EXAM BY PATHOLOGIST: CPT

## 2023-02-27 PROCEDURE — S0260 H&P FOR SURGERY: HCPCS | Performed by: UROLOGY

## 2023-02-27 PROCEDURE — 25010000002 PROPOFOL 10 MG/ML EMULSION: Performed by: NURSE ANESTHETIST, CERTIFIED REGISTERED

## 2023-02-27 PROCEDURE — 54161 CIRCUM 28 DAYS OR OLDER: CPT | Performed by: UROLOGY

## 2023-02-27 PROCEDURE — 82962 GLUCOSE BLOOD TEST: CPT

## 2023-02-27 PROCEDURE — 25010000002 FENTANYL CITRATE (PF) 50 MCG/ML SOLUTION: Performed by: NURSE ANESTHETIST, CERTIFIED REGISTERED

## 2023-02-27 PROCEDURE — 25010000002 GENTAMICIN PER 80 MG

## 2023-02-27 RX ORDER — ONDANSETRON 2 MG/ML
INJECTION INTRAMUSCULAR; INTRAVENOUS AS NEEDED
Status: DISCONTINUED | OUTPATIENT
Start: 2023-02-27 | End: 2023-02-27 | Stop reason: SURG

## 2023-02-27 RX ORDER — BUPIVACAINE HYDROCHLORIDE AND EPINEPHRINE 5; 5 MG/ML; UG/ML
INJECTION, SOLUTION EPIDURAL; INTRACAUDAL; PERINEURAL AS NEEDED
Status: DISCONTINUED | OUTPATIENT
Start: 2023-02-27 | End: 2023-02-27 | Stop reason: HOSPADM

## 2023-02-27 RX ORDER — MIDAZOLAM HYDROCHLORIDE 1 MG/ML
0.5 INJECTION INTRAMUSCULAR; INTRAVENOUS
Status: DISCONTINUED | OUTPATIENT
Start: 2023-02-27 | End: 2023-02-27 | Stop reason: HOSPADM

## 2023-02-27 RX ORDER — SODIUM CHLORIDE, SODIUM LACTATE, POTASSIUM CHLORIDE, CALCIUM CHLORIDE 600; 310; 30; 20 MG/100ML; MG/100ML; MG/100ML; MG/100ML
125 INJECTION, SOLUTION INTRAVENOUS ONCE
Status: COMPLETED | OUTPATIENT
Start: 2023-02-27 | End: 2023-02-27

## 2023-02-27 RX ORDER — SODIUM CHLORIDE 0.9 % (FLUSH) 0.9 %
10 SYRINGE (ML) INJECTION EVERY 12 HOURS SCHEDULED
Status: DISCONTINUED | OUTPATIENT
Start: 2023-02-27 | End: 2023-02-27 | Stop reason: HOSPADM

## 2023-02-27 RX ORDER — IPRATROPIUM BROMIDE AND ALBUTEROL SULFATE 2.5; .5 MG/3ML; MG/3ML
3 SOLUTION RESPIRATORY (INHALATION) ONCE AS NEEDED
Status: DISCONTINUED | OUTPATIENT
Start: 2023-02-27 | End: 2023-02-27 | Stop reason: HOSPADM

## 2023-02-27 RX ORDER — FAMOTIDINE 10 MG/ML
INJECTION, SOLUTION INTRAVENOUS AS NEEDED
Status: DISCONTINUED | OUTPATIENT
Start: 2023-02-27 | End: 2023-02-27 | Stop reason: SURG

## 2023-02-27 RX ORDER — OXYCODONE HYDROCHLORIDE AND ACETAMINOPHEN 5; 325 MG/1; MG/1
1 TABLET ORAL ONCE AS NEEDED
Status: DISCONTINUED | OUTPATIENT
Start: 2023-02-27 | End: 2023-02-27 | Stop reason: HOSPADM

## 2023-02-27 RX ORDER — MEPERIDINE HYDROCHLORIDE 25 MG/ML
12.5 INJECTION INTRAMUSCULAR; INTRAVENOUS; SUBCUTANEOUS
Status: DISCONTINUED | OUTPATIENT
Start: 2023-02-27 | End: 2023-02-27 | Stop reason: HOSPADM

## 2023-02-27 RX ORDER — FENTANYL CITRATE 50 UG/ML
INJECTION, SOLUTION INTRAMUSCULAR; INTRAVENOUS AS NEEDED
Status: DISCONTINUED | OUTPATIENT
Start: 2023-02-27 | End: 2023-02-27 | Stop reason: SURG

## 2023-02-27 RX ORDER — LIDOCAINE HYDROCHLORIDE 20 MG/ML
INJECTION, SOLUTION EPIDURAL; INFILTRATION; INTRACAUDAL; PERINEURAL AS NEEDED
Status: DISCONTINUED | OUTPATIENT
Start: 2023-02-27 | End: 2023-02-27 | Stop reason: SURG

## 2023-02-27 RX ORDER — GENTAMICIN SULFATE 80 MG/100ML
80 INJECTION, SOLUTION INTRAVENOUS ONCE
Status: COMPLETED | OUTPATIENT
Start: 2023-02-27 | End: 2023-02-27

## 2023-02-27 RX ORDER — HYDROCODONE BITARTRATE AND ACETAMINOPHEN 10; 325 MG/1; MG/1
1 TABLET ORAL EVERY 4 HOURS PRN
Qty: 12 TABLET | Refills: 0 | Status: SHIPPED | OUTPATIENT
Start: 2023-02-27

## 2023-02-27 RX ORDER — FENTANYL CITRATE 50 UG/ML
50 INJECTION, SOLUTION INTRAMUSCULAR; INTRAVENOUS
Status: DISCONTINUED | OUTPATIENT
Start: 2023-02-27 | End: 2023-02-27 | Stop reason: HOSPADM

## 2023-02-27 RX ORDER — BUPIVACAINE HYDROCHLORIDE AND EPINEPHRINE 2.5; 5 MG/ML; UG/ML
INJECTION, SOLUTION EPIDURAL; INFILTRATION; INTRACAUDAL; PERINEURAL AS NEEDED
Status: DISCONTINUED | OUTPATIENT
Start: 2023-02-27 | End: 2023-02-27 | Stop reason: HOSPADM

## 2023-02-27 RX ORDER — SODIUM CHLORIDE 9 MG/ML
40 INJECTION, SOLUTION INTRAVENOUS AS NEEDED
Status: DISCONTINUED | OUTPATIENT
Start: 2023-02-27 | End: 2023-02-27 | Stop reason: HOSPADM

## 2023-02-27 RX ORDER — BACITRACIN ZINC 500 [USP'U]/G
OINTMENT TOPICAL AS NEEDED
Status: DISCONTINUED | OUTPATIENT
Start: 2023-02-27 | End: 2023-02-27 | Stop reason: HOSPADM

## 2023-02-27 RX ORDER — MAGNESIUM HYDROXIDE 1200 MG/15ML
LIQUID ORAL AS NEEDED
Status: DISCONTINUED | OUTPATIENT
Start: 2023-02-27 | End: 2023-02-27 | Stop reason: HOSPADM

## 2023-02-27 RX ORDER — SODIUM CHLORIDE 0.9 % (FLUSH) 0.9 %
10 SYRINGE (ML) INJECTION AS NEEDED
Status: DISCONTINUED | OUTPATIENT
Start: 2023-02-27 | End: 2023-02-27 | Stop reason: HOSPADM

## 2023-02-27 RX ORDER — PROPOFOL 10 MG/ML
VIAL (ML) INTRAVENOUS AS NEEDED
Status: DISCONTINUED | OUTPATIENT
Start: 2023-02-27 | End: 2023-02-27 | Stop reason: SURG

## 2023-02-27 RX ORDER — SODIUM CHLORIDE, SODIUM LACTATE, POTASSIUM CHLORIDE, CALCIUM CHLORIDE 600; 310; 30; 20 MG/100ML; MG/100ML; MG/100ML; MG/100ML
INJECTION, SOLUTION INTRAVENOUS CONTINUOUS PRN
Status: DISCONTINUED | OUTPATIENT
Start: 2023-02-27 | End: 2023-02-27 | Stop reason: SURG

## 2023-02-27 RX ORDER — ONDANSETRON 2 MG/ML
4 INJECTION INTRAMUSCULAR; INTRAVENOUS AS NEEDED
Status: DISCONTINUED | OUTPATIENT
Start: 2023-02-27 | End: 2023-02-27 | Stop reason: HOSPADM

## 2023-02-27 RX ORDER — EPHEDRINE SULFATE 5 MG/ML
INJECTION INTRAVENOUS AS NEEDED
Status: DISCONTINUED | OUTPATIENT
Start: 2023-02-27 | End: 2023-02-27 | Stop reason: SURG

## 2023-02-27 RX ORDER — SODIUM CHLORIDE, SODIUM LACTATE, POTASSIUM CHLORIDE, CALCIUM CHLORIDE 600; 310; 30; 20 MG/100ML; MG/100ML; MG/100ML; MG/100ML
100 INJECTION, SOLUTION INTRAVENOUS ONCE AS NEEDED
Status: DISCONTINUED | OUTPATIENT
Start: 2023-02-27 | End: 2023-02-27 | Stop reason: HOSPADM

## 2023-02-27 RX ADMIN — EPHEDRINE SULFATE 10 MG: 5 INJECTION INTRAVENOUS at 08:34

## 2023-02-27 RX ADMIN — ONDANSETRON 4 MG: 2 INJECTION INTRAMUSCULAR; INTRAVENOUS at 08:12

## 2023-02-27 RX ADMIN — SODIUM CHLORIDE, POTASSIUM CHLORIDE, SODIUM LACTATE AND CALCIUM CHLORIDE: 600; 310; 30; 20 INJECTION, SOLUTION INTRAVENOUS at 08:12

## 2023-02-27 RX ADMIN — GENTAMICIN SULFATE 80 MG: 80 INJECTION, SOLUTION INTRAVENOUS at 08:12

## 2023-02-27 RX ADMIN — EPHEDRINE SULFATE 10 MG: 5 INJECTION INTRAVENOUS at 08:20

## 2023-02-27 RX ADMIN — SODIUM CHLORIDE, POTASSIUM CHLORIDE, SODIUM LACTATE AND CALCIUM CHLORIDE 125 ML/HR: 600; 310; 30; 20 INJECTION, SOLUTION INTRAVENOUS at 07:35

## 2023-02-27 RX ADMIN — PROPOFOL 100 MG: 10 INJECTION, EMULSION INTRAVENOUS at 08:15

## 2023-02-27 RX ADMIN — EPHEDRINE SULFATE 10 MG: 5 INJECTION INTRAVENOUS at 08:29

## 2023-02-27 RX ADMIN — FAMOTIDINE 20 MG: 10 INJECTION, SOLUTION INTRAVENOUS at 08:12

## 2023-02-27 RX ADMIN — EPHEDRINE SULFATE 10 MG: 5 INJECTION INTRAVENOUS at 08:17

## 2023-02-27 RX ADMIN — FENTANYL CITRATE 100 MCG: 50 INJECTION INTRAMUSCULAR; INTRAVENOUS at 08:12

## 2023-02-27 RX ADMIN — EPHEDRINE SULFATE 10 MG: 5 INJECTION INTRAVENOUS at 08:26

## 2023-02-27 RX ADMIN — EPHEDRINE SULFATE 10 MG: 5 INJECTION INTRAVENOUS at 08:23

## 2023-02-27 RX ADMIN — LIDOCAINE HYDROCHLORIDE 60 MG: 20 INJECTION, SOLUTION EPIDURAL; INFILTRATION; INTRACAUDAL; PERINEURAL at 08:15

## 2023-02-27 NOTE — ANESTHESIA PREPROCEDURE EVALUATION
Anesthesia Evaluation     no history of anesthetic complications:  NPO Solid Status: > 8 hours  NPO Liquid Status: > 8 hours           Airway   Mallampati: II  TM distance: >3 FB  Neck ROM: full  No difficulty expected  Dental - normal exam   (+) upper dentures and poor dentition    Pulmonary - normal exam   (+) a smoker Former,   Cardiovascular - normal exam    (+) hypertension, past MI , CAD, CABG >6 Months, dysrhythmias,       Neuro/Psych  GI/Hepatic/Renal/Endo    (+)   diabetes mellitus,     Musculoskeletal     Abdominal  - normal exam    Bowel sounds: normal.   Substance History      OB/GYN          Other                      Anesthesia Plan    ASA 3     general     intravenous induction     Anesthetic plan, risks, benefits, and alternatives have been provided, discussed and informed consent has been obtained with: patient.        CODE STATUS:

## 2023-02-27 NOTE — ANESTHESIA POSTPROCEDURE EVALUATION
Patient: Giorgio Omer    Procedure Summary     Date: 02/27/23 Room / Location: [facility] OR  /  [facility] OR    Anesthesia Start: 0812 Anesthesia Stop: 0845    Procedure: CIRCUMCISION (Penis) Diagnosis:       Phimosis      (Phimosis [N47.1])    Surgeons: Himanshu Espinoza MD Provider: Sy Wu MD    Anesthesia Type: general ASA Status: 3          Anesthesia Type: general    Vitals  Vitals Value Taken Time   /69 02/27/23 0916   Temp 97.4 °F (36.3 °C) 02/27/23 0846   Pulse 74 02/27/23 0916   Resp 14 02/27/23 0916   SpO2 97 % 02/27/23 0916           Post Anesthesia Care and Evaluation    Patient location during evaluation: PHASE II  Patient participation: complete - patient participated  Level of consciousness: awake and alert  Pain score: 1  Pain management: adequate    Airway patency: patent  Anesthetic complications: No anesthetic complications  PONV Status: controlled  Cardiovascular status: acceptable  Respiratory status: acceptable  Hydration status: acceptable

## 2023-02-27 NOTE — ANESTHESIA PROCEDURE NOTES
Airway  Urgency: elective    Date/Time: 2/27/2023 8:15 AM  Airway not difficult    General Information and Staff    Patient location during procedure: OR  Anesthesiologist: Sy Wu MD  CRNA/CAA: Lashaun Lozada CRNA    Indications and Patient Condition  Indications for airway management: airway protection    Preoxygenated: yes  Mask difficulty assessment: 0 - not attempted    Final Airway Details  Final airway type: supraglottic airway      Successful airway: classic  Size 4     Number of attempts at approach: 1  Assessment: lips, teeth, and gum same as pre-op    Additional Comments  LMA placed with no trauma noted. Patient tolerated well. Good seal. Secured.

## 2023-03-01 LAB — REF LAB TEST METHOD: NORMAL

## 2023-03-07 ENCOUNTER — OFFICE VISIT (OUTPATIENT)
Dept: UROLOGY | Facility: CLINIC | Age: 73
End: 2023-03-07
Payer: MEDICARE

## 2023-03-07 VITALS — HEIGHT: 74 IN | BODY MASS INDEX: 22.2 KG/M2 | WEIGHT: 173 LBS

## 2023-03-07 DIAGNOSIS — R35.0 FREQUENCY OF MICTURITION: ICD-10-CM

## 2023-03-07 DIAGNOSIS — N48.6 PEYRONIE DISEASE: ICD-10-CM

## 2023-03-07 DIAGNOSIS — Z48.816 AFTERCARE FOR CIRCUMCISION: Primary | ICD-10-CM

## 2023-03-07 DIAGNOSIS — N52.01 ERECTILE DYSFUNCTION DUE TO ARTERIAL INSUFFICIENCY: ICD-10-CM

## 2023-03-07 LAB
BILIRUB BLD-MCNC: NEGATIVE MG/DL
CLARITY, POC: CLEAR
COLOR UR: YELLOW
EXPIRATION DATE: ABNORMAL
GLUCOSE UR STRIP-MCNC: NEGATIVE MG/DL
KETONES UR QL: NEGATIVE
LEUKOCYTE EST, POC: NEGATIVE
Lab: ABNORMAL
NITRITE UR-MCNC: NEGATIVE MG/ML
PH UR: 5 [PH] (ref 5–8)
PROT UR STRIP-MCNC: ABNORMAL MG/DL
RBC # UR STRIP: NEGATIVE /UL
SP GR UR: 1.01 (ref 1–1.03)
UROBILINOGEN UR QL: NORMAL

## 2023-03-07 PROCEDURE — 81003 URINALYSIS AUTO W/O SCOPE: CPT

## 2023-03-07 PROCEDURE — 99213 OFFICE O/P EST LOW 20 MIN: CPT

## 2023-03-07 RX ORDER — CEPHALEXIN 500 MG/1
500 CAPSULE ORAL 2 TIMES DAILY
Qty: 8 CAPSULE | Refills: 0 | Status: SHIPPED | OUTPATIENT
Start: 2023-03-07

## 2023-03-07 NOTE — PROGRESS NOTES
"Chief Complaint:    Chief Complaint   Patient presents with   • phimosis        Vital Signs:   Ht 188 cm (74.02\")   Wt 78.5 kg (173 lb)   BMI 22.20 kg/m²   Body mass index is 22.2 kg/m².      HPI:  Giorgio Omer is a 72 y.o. male who presents today for follow up    History of Present Illness  Mr. Omer presents to the clinic for  a postop follow-up.  He underwent an elective circumcision by Dr. Espinoza roughly 1 week ago.  Patient states he has had some difficulty urinating with a split urinary stream.  On physical exam today He has not remove the wrap that was applied during surgery.  Patient was advised before he left the wrong treatment for roughly 48 hours after procedure.  UA today shows 3+ glucose with no signs of infection.  I removed his wrap in office and there was adhesions noted with slight bleeding.  Pathology report was unremarkable and only showed chronic inflammation. Area was cleaned well and triple antibiotic ointment was applied with a nonpressure wrap dressing.  Advised patient to remove this this evening.  Advised him to clean area with warm soap and water twice daily.  I will also give him a short course of cephalexin twice daily for infection prophylaxis due to open wounds.  Patient is also interested in referral for possible penile implant.  I will refer him to  for further evaluation.      Past Medical History:  Past Medical History:   Diagnosis Date   • Arthritis    • Coronary artery disease 10/17/2016    Surgical revascularization, Dr. Bryant, 07/01, [facility], receiving LIMA to LAD, free radial graft to PDA, saphenous graft to first diagonal, saphenous graft to obtuse marginal. MPS, May 2008, per C: Normal perfusion, EF 68%.  April 2012 - exercise Cardiolite WNL, EF preserved.   • Diabetes mellitus (HCC) 10/17/2016    on Metformin times one year, onset 2009:  March 2015.  HG A1c 8.6.    • Dyslipidemia 10/17/2016    March 2015:  Total cholesterol 143, triglycerides 326, HDL 39, LDL " 38.   • Hyperlipidemia    • Hypertension 10/17/2016    presumed essential.    • Right bundle branch block 10/17/2016    baseline       Current Meds:  Current Outpatient Medications   Medication Sig Dispense Refill   • aspirin 81 MG tablet Take 1 tablet by mouth Daily.     • clopidogrel (PLAVIX) 75 MG tablet Take 1 tablet by mouth Daily. 30 tablet 11   • HYDROcodone-acetaminophen (NORCO)  MG per tablet Take 1 tablet by mouth Every 4 (Four) Hours As Needed for Moderate Pain (Pain). 12 tablet 0   • insulin aspart (novoLOG) 100 UNIT/ML injection Inject  under the skin into the appropriate area as directed 3 (Three) Times a Day Before Meals.     • insulin detemir (LEVEMIR) 100 UNIT/ML injection Inject 12 Units under the skin into the appropriate area as directed Daily. Dispense with needles (Patient taking differently: Inject 10 Units under the skin into the appropriate area as directed Daily. Dispense with needles) 10 mL 0   • Liraglutide (VICTOZA SC) Inject  under the skin into the appropriate area as directed.     • metFORMIN (GLUCOPHAGE) 1000 MG tablet Take 1 tablet by mouth 2 (Two) Times a Day With Meals.     • metoprolol succinate XL (TOPROL-XL) 50 MG 24 hr tablet Take 25 mg by mouth Daily.     • nitroglycerin (NITROSTAT) 0.4 MG SL tablet Place 1 tablet under the tongue Every 5 (Five) Minutes As Needed for Chest Pain. Take no more than 3 doses in 15 minutes.     • rosuvastatin (CRESTOR) 40 MG tablet Take 1 tablet by mouth Every Night. 90 tablet 11   • tamsulosin (Flomax) 0.4 MG capsule 24 hr capsule Take 1 capsule by mouth Every Night. 30 capsule 5   • cephalexin (Keflex) 500 MG capsule Take 1 capsule by mouth 2 (Two) Times a Day. 8 capsule 0     No current facility-administered medications for this visit.        Allergies:   Allergies   Allergen Reactions   • Cardizem [Diltiazem Hcl]    • Celecoxib Itching        Past Surgical History:  Past Surgical History:   Procedure Laterality Date   • CARDIAC  CATHETERIZATION N/A 2022    Procedure: LEFT HEART CATH;  Surgeon: Sriram Bowers MD;  Location:  [facility];  Service: Cardiovascular;  Laterality: N/A;   • CIRCUMCISION N/A 2023    Procedure: CIRCUMCISION;  Surgeon: Himanshu Espinoza MD;  Location:  [facility] OR;  Service: Urology;  Laterality: N/A;   • COLONOSCOPY     • CORONARY ARTERY BYPASS GRAFT         Social History:  Social History     Socioeconomic History   • Marital status:    Tobacco Use   • Smoking status: Former     Types: Cigarettes     Quit date: 10/28/2001     Years since quittin.3   • Smokeless tobacco: Never   Vaping Use   • Vaping Use: Never used   Substance and Sexual Activity   • Alcohol use: No   • Drug use: No   • Sexual activity: Defer       Family History:  Family History   Problem Relation Age of Onset   • Ovarian cancer Mother    • Hypertension Father    • Stroke Father        Review of Systems:  Review of Systems   Constitutional: Negative for fatigue, fever and unexpected weight change.   Respiratory: Negative for chest tightness and shortness of breath.    Cardiovascular: Negative for chest pain.   Gastrointestinal: Negative for abdominal pain, constipation, diarrhea, nausea and vomiting.   Genitourinary: Positive for difficulty urinating, penile pain and penile swelling. Negative for dysuria, frequency, hematuria and urgency.   Skin: Negative for rash.   Psychiatric/Behavioral: Negative for confusion and suicidal ideas.       Physical Exam:  Physical Exam  Constitutional:       General: He is not in acute distress.     Appearance: Normal appearance.   HENT:      Head: Normocephalic and atraumatic.      Nose: Nose normal.      Mouth/Throat:      Mouth: Mucous membranes are moist.   Eyes:      Conjunctiva/sclera: Conjunctivae normal.   Cardiovascular:      Rate and Rhythm: Normal rate and regular rhythm.      Pulses: Normal pulses.      Heart sounds: Normal heart sounds.   Pulmonary:       Effort: Pulmonary effort is normal.      Breath sounds: Normal breath sounds.   Abdominal:      General: Bowel sounds are normal.      Palpations: Abdomen is soft.   Genitourinary:     Comments: Week old dressing was removed.  Well-healed surgical incisions.  There was some adhesions with minor bleeding noted.  Triple antibiotic ointment and a small noncompression dressing applied  Musculoskeletal:         General: Normal range of motion.      Cervical back: Normal range of motion.   Skin:     General: Skin is warm.   Neurological:      General: No focal deficit present.      Mental Status: He is alert and oriented to person, place, and time.   Psychiatric:         Mood and Affect: Mood normal.         Behavior: Behavior normal.         Thought Content: Thought content normal.         Judgment: Judgment normal.       Recent Image (CT and/or KUB):   CT Abdomen and Pelvis: No results found for this or any previous visit.     CT Stone Protocol: No results found for this or any previous visit.     KUB: No results found for this or any previous visit.       Labs:  Brief Urine Lab Results  (Last result in the past 365 days)      Color   Clarity   Blood   Leuk Est   Nitrite   Protein   CREAT   Urine HCG        03/07/23 0924 Yellow   Clear   Negative   Negative   Negative   Trace               Office Visit on 03/07/2023   Component Date Value Ref Range Status   • Color 03/07/2023 Yellow  Yellow, Straw, Dark Yellow, Zoila Final   • Clarity, UA 03/07/2023 Clear  Clear Final   • Specific Gravity  03/07/2023 1.015  1.005 - 1.030 Final   • pH, Urine 03/07/2023 5.0  5.0 - 8.0 Final   • Leukocytes 03/07/2023 Negative  Negative Final   • Nitrite, UA 03/07/2023 Negative  Negative Final   • Protein, POC 03/07/2023 Trace (A)  Negative mg/dL Final   • Glucose, UA 03/07/2023 Negative  Negative mg/dL Final   • Ketones, UA 03/07/2023 Negative  Negative Final   • Urobilinogen, UA 03/07/2023 Normal  Normal, 0.2 E.U./dL Final   • Bilirubin  "2023 Negative  Negative Final   • Blood, UA 2023 Negative  Negative Final   • Lot Number 2023 [id number]   Final   • Expiration Date 2023   Final   Admission on 2023, Discharged on 2023   Component Date Value Ref Range Status   • Glucose 2023 201 (H)  70 - 130 mg/dL Final    Meter: [id number] : [id number] GRACE   • Reference Lab Report 2023    Final                    Value:[facility]  [address] [place] [address]  Phone: [phone] or [phone]  Fax: [phone]  Rene Simon M.D., Medical Director    PATIENT NAME                           LABORATORY NO.  [id number]  ROSARIO YANG                      [id number]  [id number]                         AGE              SEX  SSN           CLIENT REF #  [facility]              72      1950      [id number]   [id number]    [facility]                     REQUESTING M.D.     ATTENDING [place] [address]                   LIAN SHULTZ  DATE COLLECTED      DATE RECEIVED      DATE REPORTED  2023    DIAGNOSIS:  FORESKIN:  Benign foreskin with scant chronic inflammation    CAM    CLINICAL HISTORY:  Phimosis    SPECIMENS RECEIVED:  FORESKIN    MICROSCOPIC DESCRIPTION:  Tissue blocks are prepared and slides are examined microscopically. See  diagnosis for details.    Professional                           interpretation rendered by Lamar Roberts M.D., F.C.A.P. at  [facility], [address] [id number].    GROSS DESCRIPTION:  Received in formalin labeled with \"foreskin\" is a 4.6 x 3.4 cm, excised a depth  of to 1.0 cm, unoriented portion of tan-gray, wrinkled skin and attached focally  edematous underlying soft tissue.  The surgical margin is inked blue.  The  specimen serially sectioned to reveal tan-pink, smooth, homogenous and focally  edematous cut " surfaces with pinpoint tortuous vessels.  No masses or lesions  identified.  Representative full-thickness sections are submitted in A1.  MJP    REVIEWED, DIAGNOSED AND ELECTRONICALLY  SIGNED BY:    Lamar Roberts M.D., F.C.A.P.  CPT CODES:  09635     Pre-Admission Testing on 02/06/2023   Component Date Value Ref Range Status   • WBC 02/06/2023 6.29  3.40 - 10.80 10*3/mm3 Final   • RBC 02/06/2023 5.13  4.14 - 5.80 10*6/mm3 Final   • Hemoglobin 02/06/2023 15.5  13.0 - 17.7 g/dL Final   • Hematocrit 02/06/2023 45.3  37.5 - 51.0 % Final   • MCV 02/06/2023 88.3  79.0 - 97.0 fL Final   • MCH 02/06/2023 30.2  26.6 - 33.0 pg Final   • MCHC 02/06/2023 34.2  31.5 - 35.7 g/dL Final   • RDW 02/06/2023 11.7 (L)  12.3 - 15.4 % Final   • RDW-SD 02/06/2023 37.1  37.0 - 54.0 fl Final   • MPV 02/06/2023 12.7 (H)  6.0 - 12.0 fL Final   • Platelets 02/06/2023 166  140 - 450 10*3/mm3 Final   • Glucose 02/06/2023 505 (C)  65 - 99 mg/dL Final   • BUN 02/06/2023 14  8 - 23 mg/dL Final   • Creatinine 02/06/2023 1.07  0.76 - 1.27 mg/dL Final   • Sodium 02/06/2023 134 (L)  136 - 145 mmol/L Final   • Potassium 02/06/2023 4.7  3.5 - 5.2 mmol/L Final   • Chloride 02/06/2023 98  98 - 107 mmol/L Final   • CO2 02/06/2023 25.5  22.0 - 29.0 mmol/L Final   • Calcium 02/06/2023 9.2  8.6 - 10.5 mg/dL Final   • BUN/Creatinine Ratio 02/06/2023 13.1  7.0 - 25.0 Final   • Anion Gap 02/06/2023 10.5  5.0 - 15.0 mmol/L Final   • eGFR 02/06/2023 73.3  >60.0 mL/min/1.73 Final        Procedure: None  Procedures     I have reviewed and agree with the above PMH, PSH, FMH, social history, medications, allergies, and labs.      Assessment/Plan:   Problem List Items Addressed This Visit        Genitourinary and Reproductive     Peyronie disease    Aftercare for circumcision - Primary    Relevant Medications    cephalexin (Keflex) 500 MG capsule   Other Visit Diagnoses     Frequency of micturition        Relevant Orders    POC Urinalysis Dipstick, Automated  (Completed)    Erectile dysfunction due to arterial insufficiency              Health Maintenance:   Health Maintenance Due   Topic Date Due   • URINE MICROALBUMIN  Never done   • COVID-19 Vaccine (1) Never done   • Pneumococcal Vaccine 65+ (1 - PCV) Never done   • TDAP/TD VACCINES (1 - Tdap) Never done   • ZOSTER VACCINE (1 of 2) Never done   • HEPATITIS C SCREENING  Never done   • DIABETIC FOOT EXAM  Never done   • DIABETIC EYE EXAM  Never done   • ANNUAL WELLNESS VISIT  05/20/2022   • INFLUENZA VACCINE  08/01/2022   • HEMOGLOBIN A1C  02/27/2023        Smoking Counseling:Former smoker.  Never used smokeless tobacco.    Urine Incontinence: Patient reports that he is not currently experiencing any symptoms of urinary incontinence.    Patient was given instructions and counseling regarding his condition or for health maintenance advice. Please see specific information pulled into the AVS if appropriate.    Patient Education:   Aftercare for circumcision -patient had left his postsurgical dressing on however was informed to remove it for 48 hours after surgery.  I removed the dressing in office and there was well-healed surgical incisions.  There was adhesions noted with minor bleeding.  I applied triple antibiotic ointment and a small noncompressing dressing was applied.  Advised patient to remove this this evening.  Advised patient to clean area with warm soap and water twice daily.  Advised patient to keep area dry.  I will also send in cephalexin twice daily for 4 days for infection prophylaxis due to slight bleeding and current small lacerations.  Patient verbalizes understanding.  Peyronie's disease and erectile dysfunction - discussed with the patient the pathophysiology of erectile dysfunction.  I explained to the patient that erectile dysfunction is a symptom and no disorder.  I educated the patient that erectile dysfunction is often secondary to significant arterial insufficiency, diabetes mellitus,  medications, trauma, low testosterone, or psychological factors.  I discussed with the patient ways to help treat erectile dysfunction which include but are not limited to medications, mechanical devices, penile injections, and penile implants.  Patient is interested for referral to  for further evaluation of a penile implant.  Advised patient that he will need to heal properly before undergoing surgery for implant.  Patient states he just wants a consult at this time.  I will refer him to  for further evaluation.  I would like to follow-up with him in 1 month for reevaluation of circumcision.  Patient verbalized understanding and agrees to plan of care.    Visit Diagnoses:    ICD-10-CM ICD-9-CM   1. Aftercare for circumcision  Z48.816 V58.76   2. Frequency of micturition  R35.0 788.41   3. Peyronie disease  N48.6 607.85   4. Erectile dysfunction due to arterial insufficiency  N52.01 607.84       Meds Ordered During Visit:  New Medications Ordered This Visit   Medications   • cephalexin (Keflex) 500 MG capsule     Sig: Take 1 capsule by mouth 2 (Two) Times a Day.     Dispense:  8 capsule     Refill:  0       Follow Up Appointment: 1 month  No follow-ups on file.      This document has been electronically signed by Silvio Roblero PA-C   March 7, 2023 10:03 EST    Part of this note may be an electronic transcription/translation of spoken language to printed text using the Dragon Dictation System.

## 2023-08-26 ENCOUNTER — APPOINTMENT (OUTPATIENT)
Dept: CARDIOLOGY | Facility: HOSPITAL | Age: 73
DRG: 281 | End: 2023-08-26
Payer: MEDICARE

## 2023-08-26 ENCOUNTER — HOSPITAL ENCOUNTER (INPATIENT)
Facility: HOSPITAL | Age: 73
LOS: 3 days | Discharge: OTHER INSTITUTION | DRG: 281 | End: 2023-08-29
Attending: STUDENT IN AN ORGANIZED HEALTH CARE EDUCATION/TRAINING PROGRAM | Admitting: STUDENT IN AN ORGANIZED HEALTH CARE EDUCATION/TRAINING PROGRAM
Payer: MEDICARE

## 2023-08-26 ENCOUNTER — APPOINTMENT (OUTPATIENT)
Dept: GENERAL RADIOLOGY | Facility: HOSPITAL | Age: 73
DRG: 281 | End: 2023-08-26
Payer: MEDICARE

## 2023-08-26 DIAGNOSIS — E78.5 DYSLIPIDEMIA: ICD-10-CM

## 2023-08-26 DIAGNOSIS — I21.4 NSTEMI (NON-ST ELEVATED MYOCARDIAL INFARCTION): ICD-10-CM

## 2023-08-26 DIAGNOSIS — I16.1 HYPERTENSIVE EMERGENCY: Primary | ICD-10-CM

## 2023-08-26 DIAGNOSIS — I10 PRIMARY HYPERTENSION: ICD-10-CM

## 2023-08-26 LAB
ALBUMIN SERPL-MCNC: 4.1 G/DL (ref 3.5–5.2)
ALBUMIN/GLOB SERPL: 1.4 G/DL
ALP SERPL-CCNC: 77 U/L (ref 39–117)
ALT SERPL W P-5'-P-CCNC: 8 U/L (ref 1–41)
ANION GAP SERPL CALCULATED.3IONS-SCNC: 12.5 MMOL/L (ref 5–15)
APTT PPP: 29.2 SECONDS (ref 26.5–34.5)
AST SERPL-CCNC: 14 U/L (ref 1–40)
BASOPHILS # BLD AUTO: 0.11 10*3/MM3 (ref 0–0.2)
BASOPHILS NFR BLD AUTO: 1.2 % (ref 0–1.5)
BH CV ECHO MEAS - ACS: 1.4 CM
BH CV ECHO MEAS - AO MAX PG: 3.8 MMHG
BH CV ECHO MEAS - AO MEAN PG: 2 MMHG
BH CV ECHO MEAS - AO ROOT DIAM: 4.1 CM
BH CV ECHO MEAS - AO V2 MAX: 98 CM/SEC
BH CV ECHO MEAS - AO V2 VTI: 15.6 CM
BH CV ECHO MEAS - AVA(I,D): 2.6 CM2
BH CV ECHO MEAS - EDV(CUBED): 185.2 ML
BH CV ECHO MEAS - EDV(MOD-SP4): 54.7 ML
BH CV ECHO MEAS - EF(MOD-SP4): 45.7 %
BH CV ECHO MEAS - ESV(CUBED): 103.8 ML
BH CV ECHO MEAS - ESV(MOD-SP4): 29.7 ML
BH CV ECHO MEAS - FS: 17.5 %
BH CV ECHO MEAS - IVS/LVPW: 1 CM
BH CV ECHO MEAS - IVSD: 1.1 CM
BH CV ECHO MEAS - LA DIMENSION: 3.4 CM
BH CV ECHO MEAS - LAT PEAK E' VEL: 6.4 CM/SEC
BH CV ECHO MEAS - LV DIASTOLIC VOL/BSA (35-75): 27.9 CM2
BH CV ECHO MEAS - LV MASS(C)D: 256.7 GRAMS
BH CV ECHO MEAS - LV MAX PG: 1.9 MMHG
BH CV ECHO MEAS - LV MEAN PG: 1 MMHG
BH CV ECHO MEAS - LV SYSTOLIC VOL/BSA (12-30): 15.2 CM2
BH CV ECHO MEAS - LV V1 MAX: 68.9 CM/SEC
BH CV ECHO MEAS - LV V1 VTI: 11.5 CM
BH CV ECHO MEAS - LVIDD: 5.7 CM
BH CV ECHO MEAS - LVIDS: 4.7 CM
BH CV ECHO MEAS - LVOT AREA: 3.5 CM2
BH CV ECHO MEAS - LVOT DIAM: 2.1 CM
BH CV ECHO MEAS - LVPWD: 1.1 CM
BH CV ECHO MEAS - MED PEAK E' VEL: 7.6 CM/SEC
BH CV ECHO MEAS - MR MAX PG: 131.5 MMHG
BH CV ECHO MEAS - MR MAX VEL: 573.3 CM/SEC
BH CV ECHO MEAS - MR MEAN PG: 81 MMHG
BH CV ECHO MEAS - MR MEAN VEL: 419 CM/SEC
BH CV ECHO MEAS - MR VTI: 172 CM
BH CV ECHO MEAS - MV A MAX VEL: 31.2 CM/SEC
BH CV ECHO MEAS - MV E MAX VEL: 95.7 CM/SEC
BH CV ECHO MEAS - MV E/A: 3.1
BH CV ECHO MEAS - PA ACC TIME: 0.05 SEC
BH CV ECHO MEAS - RAP SYSTOLE: 10 MMHG
BH CV ECHO MEAS - RVSP: 32.1 MMHG
BH CV ECHO MEAS - SI(MOD-SP4): 12.8 ML/M2
BH CV ECHO MEAS - SV(LVOT): 39.8 ML
BH CV ECHO MEAS - SV(MOD-SP4): 25 ML
BH CV ECHO MEAS - TAPSE (>1.6): 1.26 CM
BH CV ECHO MEAS - TR MAX PG: 22.1 MMHG
BH CV ECHO MEAS - TR MAX VEL: 235 CM/SEC
BH CV ECHO MEASUREMENTS AVERAGE E/E' RATIO: 13.67
BILIRUB SERPL-MCNC: 1.2 MG/DL (ref 0–1.2)
BUN SERPL-MCNC: 12 MG/DL (ref 8–23)
BUN/CREAT SERPL: 12.8 (ref 7–25)
CALCIUM SPEC-SCNC: 9.1 MG/DL (ref 8.6–10.5)
CHLORIDE SERPL-SCNC: 100 MMOL/L (ref 98–107)
CO2 SERPL-SCNC: 24.5 MMOL/L (ref 22–29)
CREAT SERPL-MCNC: 0.94 MG/DL (ref 0.76–1.27)
DEPRECATED RDW RBC AUTO: 38.2 FL (ref 37–54)
EGFRCR SERPLBLD CKD-EPI 2021: 85.6 ML/MIN/1.73
EOSINOPHIL # BLD AUTO: 0.31 10*3/MM3 (ref 0–0.4)
EOSINOPHIL NFR BLD AUTO: 3.5 % (ref 0.3–6.2)
ERYTHROCYTE [DISTWIDTH] IN BLOOD BY AUTOMATED COUNT: 11.7 % (ref 12.3–15.4)
GEN 5 2HR TROPONIN T REFLEX: 57 NG/L
GLOBULIN UR ELPH-MCNC: 2.9 GM/DL
GLUCOSE BLDC GLUCOMTR-MCNC: 316 MG/DL (ref 70–130)
GLUCOSE BLDC GLUCOMTR-MCNC: 341 MG/DL (ref 70–130)
GLUCOSE BLDC GLUCOMTR-MCNC: 412 MG/DL (ref 70–130)
GLUCOSE SERPL-MCNC: 334 MG/DL (ref 65–99)
HBA1C MFR BLD: 10.2 % (ref 4.8–5.6)
HCT VFR BLD AUTO: 47.3 % (ref 37.5–51)
HGB BLD-MCNC: 16 G/DL (ref 13–17.7)
HOLD SPECIMEN: NORMAL
HOLD SPECIMEN: NORMAL
IMM GRANULOCYTES # BLD AUTO: 0.02 10*3/MM3 (ref 0–0.05)
IMM GRANULOCYTES NFR BLD AUTO: 0.2 % (ref 0–0.5)
INR PPP: 0.91 (ref 0.9–1.1)
LYMPHOCYTES # BLD AUTO: 1.96 10*3/MM3 (ref 0.7–3.1)
LYMPHOCYTES NFR BLD AUTO: 21.8 % (ref 19.6–45.3)
MCH RBC QN AUTO: 30.2 PG (ref 26.6–33)
MCHC RBC AUTO-ENTMCNC: 33.8 G/DL (ref 31.5–35.7)
MCV RBC AUTO: 89.2 FL (ref 79–97)
MONOCYTES # BLD AUTO: 0.47 10*3/MM3 (ref 0.1–0.9)
MONOCYTES NFR BLD AUTO: 5.2 % (ref 5–12)
NEUTROPHILS NFR BLD AUTO: 6.11 10*3/MM3 (ref 1.7–7)
NEUTROPHILS NFR BLD AUTO: 68.1 % (ref 42.7–76)
NRBC BLD AUTO-RTO: 0 /100 WBC (ref 0–0.2)
NT-PROBNP SERPL-MCNC: 549.1 PG/ML (ref 0–900)
PLATELET # BLD AUTO: 186 10*3/MM3 (ref 140–450)
PMV BLD AUTO: 11.5 FL (ref 6–12)
POTASSIUM SERPL-SCNC: 3.6 MMOL/L (ref 3.5–5.2)
PROT SERPL-MCNC: 7 G/DL (ref 6–8.5)
PROTHROMBIN TIME: 12.7 SECONDS (ref 12.1–14.7)
RBC # BLD AUTO: 5.3 10*6/MM3 (ref 4.14–5.8)
SODIUM SERPL-SCNC: 137 MMOL/L (ref 136–145)
TROPONIN T DELTA: 37 NG/L
TROPONIN T SERPL HS-MCNC: 20 NG/L
TSH SERPL DL<=0.05 MIU/L-ACNC: 1.15 UIU/ML (ref 0.27–4.2)
UFH PPP CHRO-ACNC: 0.24 IU/ML (ref 0.3–0.7)
UFH PPP CHRO-ACNC: <0.1 IU/ML (ref 0.3–0.7)
WBC NRBC COR # BLD: 8.98 10*3/MM3 (ref 3.4–10.8)
WHOLE BLOOD HOLD COAG: NORMAL
WHOLE BLOOD HOLD SPECIMEN: NORMAL

## 2023-08-26 PROCEDURE — 84484 ASSAY OF TROPONIN QUANT: CPT | Performed by: STUDENT IN AN ORGANIZED HEALTH CARE EDUCATION/TRAINING PROGRAM

## 2023-08-26 PROCEDURE — 93306 TTE W/DOPPLER COMPLETE: CPT

## 2023-08-26 PROCEDURE — 25010000002 ESMOLOL 2500 MG/250ML SOLUTION: Performed by: STUDENT IN AN ORGANIZED HEALTH CARE EDUCATION/TRAINING PROGRAM

## 2023-08-26 PROCEDURE — 93005 ELECTROCARDIOGRAM TRACING: CPT | Performed by: STUDENT IN AN ORGANIZED HEALTH CARE EDUCATION/TRAINING PROGRAM

## 2023-08-26 PROCEDURE — 85520 HEPARIN ASSAY: CPT

## 2023-08-26 PROCEDURE — 99285 EMERGENCY DEPT VISIT HI MDM: CPT

## 2023-08-26 PROCEDURE — 25010000002 HEPARIN (PORCINE) 25000-0.45 UT/250ML-% SOLUTION: Performed by: STUDENT IN AN ORGANIZED HEALTH CARE EDUCATION/TRAINING PROGRAM

## 2023-08-26 PROCEDURE — 85610 PROTHROMBIN TIME: CPT | Performed by: STUDENT IN AN ORGANIZED HEALTH CARE EDUCATION/TRAINING PROGRAM

## 2023-08-26 PROCEDURE — 94799 UNLISTED PULMONARY SVC/PX: CPT

## 2023-08-26 PROCEDURE — 83036 HEMOGLOBIN GLYCOSYLATED A1C: CPT | Performed by: PHYSICIAN ASSISTANT

## 2023-08-26 PROCEDURE — 63710000001 INSULIN LISPRO (HUMAN) PER 5 UNITS: Performed by: PHYSICIAN ASSISTANT

## 2023-08-26 PROCEDURE — 85520 HEPARIN ASSAY: CPT | Performed by: STUDENT IN AN ORGANIZED HEALTH CARE EDUCATION/TRAINING PROGRAM

## 2023-08-26 PROCEDURE — 84443 ASSAY THYROID STIM HORMONE: CPT | Performed by: PHYSICIAN ASSISTANT

## 2023-08-26 PROCEDURE — 93010 ELECTROCARDIOGRAM REPORT: CPT | Performed by: INTERNAL MEDICINE

## 2023-08-26 PROCEDURE — 25010000002 HEPARIN (PORCINE) PER 1000 UNITS: Performed by: STUDENT IN AN ORGANIZED HEALTH CARE EDUCATION/TRAINING PROGRAM

## 2023-08-26 PROCEDURE — 82948 REAGENT STRIP/BLOOD GLUCOSE: CPT

## 2023-08-26 PROCEDURE — 85730 THROMBOPLASTIN TIME PARTIAL: CPT | Performed by: STUDENT IN AN ORGANIZED HEALTH CARE EDUCATION/TRAINING PROGRAM

## 2023-08-26 PROCEDURE — 80053 COMPREHEN METABOLIC PANEL: CPT | Performed by: STUDENT IN AN ORGANIZED HEALTH CARE EDUCATION/TRAINING PROGRAM

## 2023-08-26 PROCEDURE — 99222 1ST HOSP IP/OBS MODERATE 55: CPT | Performed by: INTERNAL MEDICINE

## 2023-08-26 PROCEDURE — 71045 X-RAY EXAM CHEST 1 VIEW: CPT

## 2023-08-26 PROCEDURE — 85025 COMPLETE CBC W/AUTO DIFF WBC: CPT | Performed by: STUDENT IN AN ORGANIZED HEALTH CARE EDUCATION/TRAINING PROGRAM

## 2023-08-26 PROCEDURE — 25010000002 LABETALOL 5 MG/ML SOLUTION: Performed by: STUDENT IN AN ORGANIZED HEALTH CARE EDUCATION/TRAINING PROGRAM

## 2023-08-26 PROCEDURE — 93306 TTE W/DOPPLER COMPLETE: CPT | Performed by: INTERNAL MEDICINE

## 2023-08-26 PROCEDURE — 99223 1ST HOSP IP/OBS HIGH 75: CPT | Performed by: PHYSICIAN ASSISTANT

## 2023-08-26 PROCEDURE — 71045 X-RAY EXAM CHEST 1 VIEW: CPT | Performed by: RADIOLOGY

## 2023-08-26 PROCEDURE — 83880 ASSAY OF NATRIURETIC PEPTIDE: CPT | Performed by: INTERNAL MEDICINE

## 2023-08-26 PROCEDURE — 36415 COLL VENOUS BLD VENIPUNCTURE: CPT

## 2023-08-26 RX ORDER — HEPARIN SODIUM 5000 [USP'U]/ML
25 INJECTION, SOLUTION INTRAVENOUS; SUBCUTANEOUS AS NEEDED
Status: DISCONTINUED | OUTPATIENT
Start: 2023-08-26 | End: 2023-08-26

## 2023-08-26 RX ORDER — HEPARIN SODIUM 5000 [USP'U]/ML
4000 INJECTION, SOLUTION INTRAVENOUS; SUBCUTANEOUS ONCE
Status: COMPLETED | OUTPATIENT
Start: 2023-08-26 | End: 2023-08-26

## 2023-08-26 RX ORDER — SPIRONOLACTONE 25 MG/1
25 TABLET ORAL DAILY
Status: DISCONTINUED | OUTPATIENT
Start: 2023-08-26 | End: 2023-08-26

## 2023-08-26 RX ORDER — DONEPEZIL HYDROCHLORIDE 5 MG/1
5 TABLET, FILM COATED ORAL NIGHTLY
Status: DISCONTINUED | OUTPATIENT
Start: 2023-08-26 | End: 2023-08-29 | Stop reason: HOSPADM

## 2023-08-26 RX ORDER — AMOXICILLIN 250 MG
2 CAPSULE ORAL 2 TIMES DAILY
Status: DISCONTINUED | OUTPATIENT
Start: 2023-08-26 | End: 2023-08-29 | Stop reason: HOSPADM

## 2023-08-26 RX ORDER — NICOTINE POLACRILEX 4 MG
15 LOZENGE BUCCAL
Status: DISCONTINUED | OUTPATIENT
Start: 2023-08-26 | End: 2023-08-29 | Stop reason: HOSPADM

## 2023-08-26 RX ORDER — BISACODYL 10 MG
10 SUPPOSITORY, RECTAL RECTAL DAILY PRN
Status: DISCONTINUED | OUTPATIENT
Start: 2023-08-26 | End: 2023-08-29 | Stop reason: HOSPADM

## 2023-08-26 RX ORDER — DEXTROSE MONOHYDRATE 25 G/50ML
25 INJECTION, SOLUTION INTRAVENOUS
Status: DISCONTINUED | OUTPATIENT
Start: 2023-08-26 | End: 2023-08-29 | Stop reason: HOSPADM

## 2023-08-26 RX ORDER — POLYETHYLENE GLYCOL 3350 17 G/17G
17 POWDER, FOR SOLUTION ORAL DAILY PRN
Status: DISCONTINUED | OUTPATIENT
Start: 2023-08-26 | End: 2023-08-29 | Stop reason: HOSPADM

## 2023-08-26 RX ORDER — INSULIN LISPRO 100 [IU]/ML
2-7 INJECTION, SOLUTION INTRAVENOUS; SUBCUTANEOUS
Status: DISCONTINUED | OUTPATIENT
Start: 2023-08-26 | End: 2023-08-29 | Stop reason: HOSPADM

## 2023-08-26 RX ORDER — SODIUM CHLORIDE 0.9 % (FLUSH) 0.9 %
10 SYRINGE (ML) INJECTION EVERY 12 HOURS SCHEDULED
Status: DISCONTINUED | OUTPATIENT
Start: 2023-08-26 | End: 2023-08-29 | Stop reason: HOSPADM

## 2023-08-26 RX ORDER — HYDRALAZINE HYDROCHLORIDE 25 MG/1
25 TABLET, FILM COATED ORAL EVERY 8 HOURS SCHEDULED
Status: DISCONTINUED | OUTPATIENT
Start: 2023-08-26 | End: 2023-08-26

## 2023-08-26 RX ORDER — LABETALOL HYDROCHLORIDE 5 MG/ML
10 INJECTION, SOLUTION INTRAVENOUS ONCE
Status: COMPLETED | OUTPATIENT
Start: 2023-08-26 | End: 2023-08-26

## 2023-08-26 RX ORDER — SODIUM CHLORIDE 9 MG/ML
40 INJECTION, SOLUTION INTRAVENOUS AS NEEDED
Status: DISCONTINUED | OUTPATIENT
Start: 2023-08-26 | End: 2023-08-29 | Stop reason: HOSPADM

## 2023-08-26 RX ORDER — ATORVASTATIN CALCIUM 40 MG/1
40 TABLET, FILM COATED ORAL NIGHTLY
Status: DISCONTINUED | OUTPATIENT
Start: 2023-08-26 | End: 2023-08-27

## 2023-08-26 RX ORDER — BISACODYL 5 MG/1
5 TABLET, DELAYED RELEASE ORAL DAILY PRN
Status: DISCONTINUED | OUTPATIENT
Start: 2023-08-26 | End: 2023-08-29 | Stop reason: HOSPADM

## 2023-08-26 RX ORDER — ISOSORBIDE MONONITRATE 30 MG/1
60 TABLET, EXTENDED RELEASE ORAL
Status: DISCONTINUED | OUTPATIENT
Start: 2023-08-26 | End: 2023-08-26

## 2023-08-26 RX ORDER — ASPIRIN 81 MG/1
324 TABLET, CHEWABLE ORAL ONCE
Status: COMPLETED | OUTPATIENT
Start: 2023-08-26 | End: 2023-08-26

## 2023-08-26 RX ORDER — SODIUM CHLORIDE 0.9 % (FLUSH) 0.9 %
10 SYRINGE (ML) INJECTION AS NEEDED
Status: DISCONTINUED | OUTPATIENT
Start: 2023-08-26 | End: 2023-08-29 | Stop reason: HOSPADM

## 2023-08-26 RX ORDER — ESMOLOL HYDROCHLORIDE 10 MG/ML
25-300 INJECTION, SOLUTION INTRAVENOUS
Status: DISCONTINUED | OUTPATIENT
Start: 2023-08-26 | End: 2023-08-26

## 2023-08-26 RX ORDER — HEPARIN SODIUM 5000 [USP'U]/ML
50 INJECTION, SOLUTION INTRAVENOUS; SUBCUTANEOUS AS NEEDED
Status: DISCONTINUED | OUTPATIENT
Start: 2023-08-26 | End: 2023-08-26

## 2023-08-26 RX ORDER — ASPIRIN 81 MG/1
81 TABLET, CHEWABLE ORAL DAILY
Status: DISCONTINUED | OUTPATIENT
Start: 2023-08-27 | End: 2023-08-29 | Stop reason: HOSPADM

## 2023-08-26 RX ORDER — HEPARIN SODIUM 10000 [USP'U]/100ML
20 INJECTION, SOLUTION INTRAVENOUS
Status: DISCONTINUED | OUTPATIENT
Start: 2023-08-26 | End: 2023-08-28

## 2023-08-26 RX ORDER — CARVEDILOL 6.25 MG/1
12.5 TABLET ORAL EVERY 12 HOURS SCHEDULED
Status: DISCONTINUED | OUTPATIENT
Start: 2023-08-26 | End: 2023-08-26

## 2023-08-26 RX ADMIN — LABETALOL HYDROCHLORIDE 10 MG: 5 INJECTION, SOLUTION INTRAVENOUS at 10:45

## 2023-08-26 RX ADMIN — ISOSORBIDE MONONITRATE 60 MG: 30 TABLET, EXTENDED RELEASE ORAL at 18:47

## 2023-08-26 RX ADMIN — ATORVASTATIN CALCIUM 40 MG: 40 TABLET, FILM COATED ORAL at 21:55

## 2023-08-26 RX ADMIN — Medication 10 ML: at 21:06

## 2023-08-26 RX ADMIN — HEPARIN SODIUM 12 UNITS/KG/HR: 10000 INJECTION, SOLUTION INTRAVENOUS at 13:03

## 2023-08-26 RX ADMIN — HYDRALAZINE HYDROCHLORIDE 25 MG: 25 TABLET, FILM COATED ORAL at 17:07

## 2023-08-26 RX ADMIN — LABETALOL HYDROCHLORIDE 10 MG: 5 INJECTION, SOLUTION INTRAVENOUS at 09:29

## 2023-08-26 RX ADMIN — HEPARIN SODIUM 4000 UNITS: 5000 INJECTION INTRAVENOUS; SUBCUTANEOUS at 13:02

## 2023-08-26 RX ADMIN — DONEPEZIL HYDROCHLORIDE 5 MG: 5 TABLET, FILM COATED ORAL at 21:55

## 2023-08-26 RX ADMIN — CARVEDILOL 12.5 MG: 6.25 TABLET, FILM COATED ORAL at 17:07

## 2023-08-26 RX ADMIN — INSULIN LISPRO 5 UNITS: 100 INJECTION, SOLUTION INTRAVENOUS; SUBCUTANEOUS at 17:20

## 2023-08-26 RX ADMIN — SODIUM CHLORIDE 500 ML: 9 INJECTION, SOLUTION INTRAVENOUS at 21:06

## 2023-08-26 RX ADMIN — INSULIN LISPRO 5 UNITS: 100 INJECTION, SOLUTION INTRAVENOUS; SUBCUTANEOUS at 21:55

## 2023-08-26 RX ADMIN — SPIRONOLACTONE 25 MG: 25 TABLET ORAL at 17:33

## 2023-08-26 RX ADMIN — ASPIRIN 324 MG: 81 TABLET, CHEWABLE ORAL at 08:39

## 2023-08-26 RX ADMIN — Medication 10 ML: at 23:04

## 2023-08-26 RX ADMIN — ESMOLOL HYDROCHLORIDE IN SODIUM CHLORIDE 25 MCG/KG/MIN: 10 INJECTION INTRAVENOUS at 12:24

## 2023-08-26 NOTE — ED PROVIDER NOTES
Subjective   History of Present Illness  72-year-old male with past medical history of coronary artery disease requiring bypass and stenting, diabetes, hypertension, hyperlipidemia presents to the ER due to concerns for crushing chest pain.  Patient noted chest pain radiates across his left arm into the left neck.  Confirmed associated shortness of breath.  No nausea.  Patient does take home metoprolol but he is unaware of his dose.  Patient has not taken his medication lately.  No obvious alleviating factor other than rest.  Slight aggravation with exertion.  Elevated blood pressure noted on arrival.  Afebrile    Review of Systems   Respiratory:  Positive for shortness of breath.    Cardiovascular:  Positive for chest pain.   All other systems reviewed and are negative.    Past Medical History:   Diagnosis Date    Arthritis     Coronary artery disease 10/17/2016    Surgical revascularization, Dr. Bryant, 07/01, [facility], receiving LIMA to LAD, free radial graft to PDA, saphenous graft to first diagonal, saphenous graft to obtuse marginal. MPS, May 2008, per [facility]: Normal perfusion, EF 68%.  April 2012 - exercise Cardiolite WNL, EF preserved.    Diabetes mellitus 10/17/2016    on Metformin times one year, onset 2009:  March 2015.  HG A1c 8.6.     Dyslipidemia 10/17/2016    March 2015:  Total cholesterol 143, triglycerides 326, HDL 39, LDL 38.    Hyperlipidemia     Hypertension 10/17/2016    presumed essential.     Right bundle branch block 10/17/2016    baseline       Allergies   Allergen Reactions    Cardizem [Diltiazem Hcl]     Celecoxib Itching       Past Surgical History:   Procedure Laterality Date    CARDIAC CATHETERIZATION N/A 08/26/2022    Procedure: LEFT HEART CATH;  Surgeon: Sriram Bowers MD;  Location:  [facility];  Service: Cardiovascular;  Laterality: N/A;    CIRCUMCISION N/A 2/27/2023    Procedure: CIRCUMCISION;  Surgeon: Himanshu Espinoza MD;  Location:  [facility];  Service:  Urology;  Laterality: N/A;    COLONOSCOPY      CORONARY ARTERY BYPASS GRAFT         Family History   Problem Relation Age of Onset    Ovarian cancer Mother     Hypertension Father     Stroke Father        Social History     Socioeconomic History    Marital status:    Tobacco Use    Smoking status: Former     Types: Cigarettes     Quit date: 10/28/2001     Years since quittin.8    Smokeless tobacco: Never   Vaping Use    Vaping Use: Never used   Substance and Sexual Activity    Alcohol use: No    Drug use: No    Sexual activity: Defer           Objective   Physical Exam  Constitutional:       General: He is not in acute distress.     Appearance: He is well-developed. He is not ill-appearing.   HENT:      Head: Normocephalic and atraumatic.   Eyes:      Extraocular Movements: Extraocular movements intact.      Pupils: Pupils are equal, round, and reactive to light.   Neck:      Vascular: No JVD.   Cardiovascular:      Rate and Rhythm: Normal rate and regular rhythm.      Heart sounds: Normal heart sounds. No murmur heard.  Pulmonary:      Effort: No tachypnea, accessory muscle usage or respiratory distress.      Breath sounds: Normal breath sounds. No stridor. No decreased breath sounds, wheezing, rhonchi or rales.   Chest:      Chest wall: No deformity, tenderness or crepitus.      Comments: Vertical midsternal incision scar from previous CABG  Abdominal:      General: Bowel sounds are normal.      Palpations: Abdomen is soft.      Tenderness: There is no abdominal tenderness. There is no guarding or rebound.   Musculoskeletal:         General: Normal range of motion.      Cervical back: Normal range of motion and neck supple.      Right lower leg: No tenderness. No edema.      Left lower leg: No tenderness. No edema.   Lymphadenopathy:      Cervical: No cervical adenopathy.   Skin:     General: Skin is warm and dry.      Coloration: Skin is not cyanotic.      Findings: No ecchymosis or erythema.    Neurological:      General: No focal deficit present.      Mental Status: He is alert and oriented to person, place, and time.      Cranial Nerves: No cranial nerve deficit.      Motor: No weakness.   Psychiatric:         Mood and Affect: Mood normal. Mood is not anxious.         Behavior: Behavior normal. Behavior is not agitated.       Procedures           ED Course  ED Course as of 08/26/23 1140   Sat Aug 26, 2023   0828 EKG notes sinus rhythm.  74 bpm.  ST depression in the anterior leads.  No acute ST elevation.  Some ST depression also in the lateral leads.  Right bundle branch block.  Electronically signed by Dayton Vizcarra DO, 08/26/23, 8:29 AM EDT.   [SF]      ED Course User Index  [SF] Dayton Vizcarra DO      XR Chest 1 View    Result Date: 8/26/2023  1. No acute cardiopulmonary disease.  This report was finalized on 8/26/2023 9:33 AM by Dane Landers MD.       Results for orders placed or performed during the hospital encounter of 08/26/23   Comprehensive Metabolic Panel    Specimen: Blood   Result Value Ref Range    Glucose 334 (H) 65 - 99 mg/dL    BUN 12 8 - 23 mg/dL    Creatinine 0.94 0.76 - 1.27 mg/dL    Sodium 137 136 - 145 mmol/L    Potassium 3.6 3.5 - 5.2 mmol/L    Chloride 100 98 - 107 mmol/L    CO2 24.5 22.0 - 29.0 mmol/L    Calcium 9.1 8.6 - 10.5 mg/dL    Total Protein 7.0 6.0 - 8.5 g/dL    Albumin 4.1 3.5 - 5.2 g/dL    ALT (SGPT) 8 1 - 41 U/L    AST (SGOT) 14 1 - 40 U/L    Alkaline Phosphatase 77 39 - 117 U/L    Total Bilirubin 1.2 0.0 - 1.2 mg/dL    Globulin 2.9 gm/dL    A/G Ratio 1.4 g/dL    BUN/Creatinine Ratio 12.8 7.0 - 25.0    Anion Gap 12.5 5.0 - 15.0 mmol/L    eGFR 85.6 >60.0 mL/min/1.73   High Sensitivity Troponin T    Specimen: Blood   Result Value Ref Range    HS Troponin T 20 (H) <15 ng/L   CBC Auto Differential    Specimen: Blood   Result Value Ref Range    WBC 8.98 3.40 - 10.80 10*3/mm3    RBC 5.30 4.14 - 5.80 10*6/mm3    Hemoglobin 16.0 13.0 - 17.7 g/dL    Hematocrit 47.3  37.5 - 51.0 %    MCV 89.2 79.0 - 97.0 fL    MCH 30.2 26.6 - 33.0 pg    MCHC 33.8 31.5 - 35.7 g/dL    RDW 11.7 (L) 12.3 - 15.4 %    RDW-SD 38.2 37.0 - 54.0 fl    MPV 11.5 6.0 - 12.0 fL    Platelets 186 140 - 450 10*3/mm3    Neutrophil % 68.1 42.7 - 76.0 %    Lymphocyte % 21.8 19.6 - 45.3 %    Monocyte % 5.2 5.0 - 12.0 %    Eosinophil % 3.5 0.3 - 6.2 %    Basophil % 1.2 0.0 - 1.5 %    Immature Grans % 0.2 0.0 - 0.5 %    Neutrophils, Absolute 6.11 1.70 - 7.00 10*3/mm3    Lymphocytes, Absolute 1.96 0.70 - 3.10 10*3/mm3    Monocytes, Absolute 0.47 0.10 - 0.90 10*3/mm3    Eosinophils, Absolute 0.31 0.00 - 0.40 10*3/mm3    Basophils, Absolute 0.11 0.00 - 0.20 10*3/mm3    Immature Grans, Absolute 0.02 0.00 - 0.05 10*3/mm3    nRBC 0.0 0.0 - 0.2 /100 WBC   High Sensitivity Troponin T 2Hr    Specimen: Blood   Result Value Ref Range    HS Troponin T 57 (C) <15 ng/L    Troponin T Delta 37 (C) >=-4 - <+4 ng/L   ECG 12 Lead Chest Pain   Result Value Ref Range    QT Interval 442 ms    QTC Interval 490 ms   Green Top (Gel)   Result Value Ref Range    Extra Tube Hold for add-ons.    Lavender Top   Result Value Ref Range    Extra Tube hold for add-on    Gold Top - SST   Result Value Ref Range    Extra Tube Hold for add-ons.    Light Blue Top   Result Value Ref Range    Extra Tube Hold for add-ons.                      HEART Score: 8                      Medical Decision Making  CBC and CMP unremarkable.  Troponin upward trend is noted.  EKG notes multiple ST depressions.  Chest x-ray listed.  Heart score of 8.  Elevated blood pressure noted despite multiple doses of labetalol.  Considering Cardene drip for hypertensive urgency versus emergency.  Recommend admission for further work up and treatment.  Hospitalist team consulted and made aware of the patient.  Consults and orders placed per hospitalist request.  Patient was agreeable to admission plan.  Vitals stable on admission.    Problems Addressed:  Chest pain, unspecified type:  complicated acute illness or injury  Hypertensive urgency: complicated acute illness or injury    Amount and/or Complexity of Data Reviewed  Labs: ordered. Decision-making details documented in ED Course.  Radiology: ordered. Decision-making details documented in ED Course.  ECG/medicine tests: ordered.    Risk  OTC drugs.  Prescription drug management.        Final diagnoses:   Hypertensive emergency   NSTEMI (non-ST elevated myocardial infarction)       ED Disposition  ED Disposition       ED Disposition   Intended Admit    Condition   --    Comment   --               No follow-up provider specified.       Medication List      No changes were made to your prescriptions during this visit.            Dayton Vizcarra,   08/26/23 1140

## 2023-08-26 NOTE — ED NOTES
Pt transported to PCU by nurse, and ED tech via stretcher. Zole applied to patient at transfer. Iv's dated/initialed. Bedding clean and dry.

## 2023-08-26 NOTE — CASE MANAGEMENT/SOCIAL WORK
Discharge Planning Assessment  [facility]     Patient Name: Giorgio Omer  MRN: [id number]  Today's Date: 8/26/2023    Admit Date: 8/26/2023    Plan: Pt lives at home and plans to return home at discharge family to provide transportation. Pcp is Dr Martinez, he uses [facility] and has [place] and Medicare. Pt does not use any dme, home health or home o2.   Discharge Needs Assessment       Row Name 08/26/23 1836       Living Environment    Current Living Arrangements home    Potentially Unsafe Housing Conditions none    Primary Care Provided by self    Quality of Family Relationships unable to assess    Able to Return to Prior Arrangements yes       Resource/Environmental Concerns    Resource/Environmental Concerns none       Transition Planning    Patient/Family Anticipates Transition to home with family    Patient/Family Anticipated Services at Transition none    Transportation Anticipated family or friend will provide       Discharge Needs Assessment    Readmission Within the Last 30 Days no previous admission in last 30 days    Equipment Currently Used at Home none    Concerns to be Addressed no discharge needs identified;denies needs/concerns at this time    Anticipated Changes Related to Illness none    Equipment Needed After Discharge none                   Discharge Plan       Row Name 08/26/23 4857       Plan    Plan Pt lives at home and plans to return home at discharge family to provide transportation. Pcp is Dr Martinez, he uses [facility] and has [place] and Medicare. Pt does not use any dme, home health or home o2.                  Continued Care and Services - Admitted Since 8/26/2023    Coordination has not been started for this encounter.       Expected Discharge Date and Time       Expected Discharge Date Expected Discharge Time    Aug 28, 2023            Demographic Summary       Row Name 08/26/23 1836       General Information    Admission Type inpatient    Arrived From  home    Referral Source emergency department    Reason for Consult discharge planning                      Jaymie Molina, RN

## 2023-08-26 NOTE — H&P
[facility] Medicine Services  HISTORY & PHYSICAL    Patient Identification:  Name:  Giorgio Omer  Age:  72 y.o.  Sex:  male  :  1950  MRN:  [id number]   Visit Number:  [id number]  Admit Date: 2023   Primary Care Physician:  Lurdes Estrada MD     Subjective     Chief complaint:   Chief Complaint   Patient presents with    Chest Pain     History of presenting illness:   Patient is a 72 y.o. male with past medical history significant for coronary artery disease s/p CABG and stenting in the past, essential hypertension, hyperlipidemia, PVD, insulin dependent type II diabetes mellitus, history of phimosis s/p recent circumcision 2023, BPH, and former tobacco abuse that presented to the [facility] emergency department for evaluation of chest pain. Patient states that shortly after waking up this morning, while still in bed, he developed acute onset chest pain. Patient states the chest pain was across his entire anterior chest, felt like a hammer hitting him. He reports associated nausea and diaphoresis, no emesis. He states he was concerned he might be having a heart attack, thus he came to ED for evaluation. He reports taking NTG without relief of symptoms. He reports symptoms being constant and still present currently with only minimal improvement since arrival. He denies any palpitations. Denies any radiation of the pain. Denies any associated shortness of breath. He denies any headaches or dizziness, no LOC. Denies any abdominal pain or change in BMs. Denies any cough or upper respiratory complaints. Denies any urinary complaints. He does report not taking any of his home medications in the past two weeks. He states that two weeks ago he ran out of his home medications, he reports having other things on his mind and not [facility].     Upon arrival to the ED, vitals were temperature 97, HR 72, RR 20, /101, and oxygen  saturation 100% on room air. Initial HS troponin T 20 with repeat at 57, positive delta of 37. CMP with glucose 334, otherwise unremarkable. CBC grossly unremarkable. CXR with no acute cardiopulmonary disease. Known ED medication administration: 324 mg PO aspirin x 1, heparin bolus 4000 units IV followed by gtt per protocol, 10 mg IV labetalol x 2, Esmolol gtt per protocol.     Patient has been admitted to the progressive care unit for further evaluation and treatment.     Present during exam: patient's son   ---------------------------------------------------------------------------------------------------------------------   Review of Systems   Constitutional:  Positive for diaphoresis. Negative for activity change, appetite change, chills, fatigue and fever.   HENT:  Negative for congestion and sore throat.    Eyes:  Negative for discharge and visual disturbance.   Respiratory:  Negative for cough, chest tightness, shortness of breath and wheezing.    Cardiovascular:  Positive for chest pain. Negative for palpitations and leg swelling.   Gastrointestinal:  Positive for nausea. Negative for abdominal pain, constipation, diarrhea and vomiting.   Genitourinary:  Negative for decreased urine volume, dysuria, frequency and urgency.   Musculoskeletal:  Negative for arthralgias and myalgias.   Skin:  Negative for color change and wound.   Neurological:  Negative for dizziness, syncope, weakness, light-headedness and headaches.   Psychiatric/Behavioral:  Negative for confusion. The patient is not nervous/anxious.     ---------------------------------------------------------------------------------------------------------------------   Past Medical History:   Diagnosis Date    Arthritis     Coronary artery disease 10/17/2016    Surgical revascularization, Dr. Bryant, 07/01, [facility], receiving LIMA to LAD, free radial graft to PDA, saphenous graft to first diagonal, saphenous graft to obtuse marginal. MPS, May 2008, per  [facility]: Normal perfusion, EF 68%.  2012 - exercise Cardiolite WNL, EF preserved.    Diabetes mellitus 10/17/2016    on Metformin times one year, onset :  2015.  HG A1c 8.6.     Dyslipidemia 10/17/2016    2015:  Total cholesterol 143, triglycerides 326, HDL 39, LDL 38.    Hyperlipidemia     Hypertension 10/17/2016    presumed essential.     Right bundle branch block 10/17/2016    baseline     Past Surgical History:   Procedure Laterality Date    CARDIAC CATHETERIZATION N/A 2022    Procedure: LEFT HEART CATH;  Surgeon: Sriram Bowers MD;  Location:  [facility];  Service: Cardiovascular;  Laterality: N/A;    CIRCUMCISION N/A 2023    Procedure: CIRCUMCISION;  Surgeon: Himanshu Espinoza MD;  Location:  [facility] OR;  Service: Urology;  Laterality: N/A;    COLONOSCOPY      CORONARY ARTERY BYPASS GRAFT       Family History   Problem Relation Age of Onset    Ovarian cancer Mother     Hypertension Father     Stroke Father      Social History     Socioeconomic History    Marital status:    Tobacco Use    Smoking status: Former     Types: Cigarettes     Quit date: 10/28/2001     Years since quittin.8    Smokeless tobacco: Never   Vaping Use    Vaping Use: Never used   Substance and Sexual Activity    Alcohol use: No    Drug use: No    Sexual activity: Defer     ---------------------------------------------------------------------------------------------------------------------   Allergies:  Cardizem [diltiazem hcl] and Celecoxib  ---------------------------------------------------------------------------------------------------------------------   Medications below are reported home medications pulling from within the system; at this time, these medications have not been reconciled unless otherwise specified and are in the verification process for further verifcation as current home medications.    Prior to Admission Medications       Prescriptions Last Dose  Informant Patient Reported? Taking?    aspirin 81 MG tablet   Yes No    Take 1 tablet by mouth Daily.    cephalexin (Keflex) 500 MG capsule   No No    Take 1 capsule by mouth 2 (Two) Times a Day.    clopidogrel (PLAVIX) 75 MG tablet   No No    Take 1 tablet by mouth Daily.    HYDROcodone-acetaminophen (NORCO)  MG per tablet   No No    Take 1 tablet by mouth Every 4 (Four) Hours As Needed for Moderate Pain (Pain).    insulin aspart (novoLOG) 100 UNIT/ML injection   Yes No    Inject  under the skin into the appropriate area as directed 3 (Three) Times a Day Before Meals.    insulin detemir (LEVEMIR) 100 UNIT/ML injection   No No    Inject 12 Units under the skin into the appropriate area as directed Daily. Dispense with needles    Patient taking differently:  Inject 10 Units under the skin into the appropriate area as directed Daily. Dispense with needles    Liraglutide (VICTOZA SC)   Yes No    Inject  under the skin into the appropriate area as directed.    metFORMIN (GLUCOPHAGE) 1000 MG tablet   Yes No    Take 1 tablet by mouth 2 (Two) Times a Day With Meals.    metoprolol succinate XL (TOPROL-XL) 50 MG 24 hr tablet   Yes No    Take 25 mg by mouth Daily.    nitroglycerin (NITROSTAT) 0.4 MG SL tablet   Yes No    Place 1 tablet under the tongue Every 5 (Five) Minutes As Needed for Chest Pain. Take no more than 3 doses in 15 minutes.    rosuvastatin (CRESTOR) 40 MG tablet   No No    Take 1 tablet by mouth Every Night.    tamsulosin (Flomax) 0.4 MG capsule 24 hr capsule   No No    Take 1 capsule by mouth Every Night.          ---------------------------------------------------------------------------------------------------------------------    Objective     Hospital Scheduled Meds:  heparin (porcine), 4,000 Units, Intravenous, Once      esmolol,  mcg/kg/min  heparin, 12 Units/kg/hr  Pharmacy to Dose Heparin,     Current listed hospital scheduled medications may not yet reflect those currently placed in  orders that are signed and held, awaiting patient's arrival to floor/unit.    ---------------------------------------------------------------------------------------------------------------------   Vital Signs:  Temp:  [97 °F (36.1 °C)] 97 °F (36.1 °C)  Heart Rate:  [72-87] 87  Resp:  [20] 20  BP: (175-210)/(101-124) 175/113  Mean Arterial Pressure (Non-Invasive) for the past 24 hrs (Last 3 readings):   Noninvasive MAP (mmHg)   08/26/23 1130 118   08/26/23 1120 139   08/26/23 1043 146     SpO2 Percentage    08/26/23 1043 08/26/23 1120 08/26/23 1130   SpO2: 99% 100% 99%     SpO2:  [99 %-100 %] 99 %  on   ;   Device (Oxygen Therapy): room air    Body mass index is 20.56 kg/m².  Wt Readings from Last 3 Encounters:   08/26/23 71.7 kg (158 lb)   03/07/23 78.5 kg (173 lb)   02/27/23 78.7 kg (173 lb 9.6 oz)       ---------------------------------------------------------------------------------------------------------------------   Physical Exam:  Physical Exam  Nursing note reviewed.   Constitutional:       General: He is awake. He is not in acute distress.     Appearance: He is well-developed. He is not toxic-appearing.      Comments: Sitting up in bed. No acute distress noted. Son present at bedside.    HENT:      Head: Normocephalic and atraumatic.      Mouth/Throat:      Mouth: Mucous membranes are moist.   Eyes:      Conjunctiva/sclera: Conjunctivae normal.      Pupils: Pupils are equal, round, and reactive to light.   Cardiovascular:      Rate and Rhythm: Normal rate and regular rhythm.      Pulses:           Dorsalis pedis pulses are 2+ on the right side and 2+ on the left side.      Heart sounds: Normal heart sounds. No murmur heard.    No friction rub. No gallop.   Pulmonary:      Effort: Pulmonary effort is normal. No tachypnea, accessory muscle usage or respiratory distress.      Breath sounds: Normal breath sounds and air entry. No wheezing, rhonchi or rales.      Comments: On room air. Speaks in full sentences  without dyspnea.  Abdominal:      General: Bowel sounds are normal. There is no distension.      Palpations: Abdomen is soft.      Tenderness: There is no abdominal tenderness.   Musculoskeletal:      Cervical back: Neck supple.      Right lower leg: No edema.      Left lower leg: No edema.   Skin:     General: Skin is warm and dry.      Capillary Refill: Capillary refill takes less than 2 seconds.   Neurological:      General: No focal deficit present.      Mental Status: He is alert and oriented to person, place, and time.      Sensory: Sensation is intact.      Motor: Motor function is intact.      Comments: Awake and alert. Follows commands. Answers questions appropriately. Moves all extremities equally. Strength and sensation intact. No focal neuro deficit on exam.   Psychiatric:         Attention and Perception: Attention normal.         Mood and Affect: Mood and affect normal.         Speech: Speech normal.         Behavior: Behavior is cooperative.     ---------------------------------------------------------------------------------------------------------------------  EKG:  Pending cardiology read. Per my interpretation: Sinus with HR 74 BPM, QTc 490 ms (RBBB noted). PAC present. T wave inversion anterolateral leads. Await final cardiology read.    Telemetry:    Sinus 90s     I have personally reviewed the EKG/Telemetry strip  ---------------------------------------------------------------------------------------------------------------------   Results from last 7 days   Lab Units 08/26/23  1045 08/26/23  0841   HSTROP T ng/L 57* 20*           Results from last 7 days   Lab Units 08/26/23  0841   WBC 10*3/mm3 8.98   HEMOGLOBIN g/dL 16.0   HEMATOCRIT % 47.3   MCV fL 89.2   MCHC g/dL 33.8   PLATELETS 10*3/mm3 186     Results from last 7 days   Lab Units 08/26/23  0841   SODIUM mmol/L 137   POTASSIUM mmol/L 3.6   CHLORIDE mmol/L 100   CO2 mmol/L 24.5   BUN mg/dL 12   CREATININE mg/dL 0.94   CALCIUM mg/dL 9.1    GLUCOSE mg/dL 334*   ALBUMIN g/dL 4.1   BILIRUBIN mg/dL 1.2   ALK PHOS U/L 77   AST (SGOT) U/L 14   ALT (SGPT) U/L 8   Estimated Creatinine Clearance: 71 mL/min (by C-G formula based on SCr of 0.94 mg/dL).  Lab Results   Component Value Date    HGBA1C 10.60 (H) 08/27/2022     I have personally reviewed the above laboratory results.   ---------------------------------------------------------------------------------------------------------------------  Imaging Results (Last 7 Days)       Procedure Component Value Units Date/Time    XR Chest 1 View [[id number]] Collected: 08/26/23 0932     Updated: 08/26/23 0935    Narrative:      Examination: AP portable chest     CLINICAL HISTORY: Chest pain     FINDINGS: There has been prior median sternotomy and CABG. The cardiac  silhouette is normal in size. Pulmonary vascularity is normal. Multiple  old healed left-sided rib fractures are noted. No pneumothorax or  pleural effusion. Mild left basilar scarring or atelectasis. The lungs  are otherwise clear.       Impression:      1. No acute cardiopulmonary disease.     This report was finalized on 8/26/2023 9:33 AM by Dane Landers MD.             I have personally reviewed the above radiology results.   ---------------------------------------------------------------------------------------------------------------------    Assessment & Plan      ACUTE HOSPITAL PROBLEMS    -NSTEMI, type I vs type II   -Chest pain, mixed features  -Hypertensive emergency   -Known coronary artery disease s/p CABG (2001) and stenting in the past   Likely type II demand from uncontrolled HTN in setting of not taking home medications   Heparin gtt initiated in ED, continue with protocol in place   Esmolol drip initiated, continue with protocol in place   Cardiology consulted, input/assistance is much appreciated   Obtain TTE   Aspirin statin   AM lipid panel, TSH, A1C  Closely monitor on telemetry   Monitor vitals per hospital protocol, adjust  medication regimen as necessary   Repeat labs in AM     -F/E/N  No IV fluids. Replace electrolytes per protocol as necessary.     CHRONIC MEDICAL PROBLEMS    -Type II diabetes mellitus, insulin dependent  Obtain HgbA1C  Review home medication regimen once reconciled per pharmacy   Hold home oral DM medications for now.  Start low dose SSI, titrate dosage as necessary   Closely monitor blood glucose levels with accuchecks QAC and QHS  Hypoglycemia protocol in place should it be necessary  -Hyperlipidemia: Cont statin   -PAD: Arterial doppler from [facility] on 7//2/2023 reviewed with long segment SFA occlusion with poor flow into the infrapopliteal circulation. Patient was seen at [facility] ED at that time, vascular surgery contacted with recommendation of Xarelto 2.5 mg BID and outpatient follow up.   -History of phimosis s/p recent circumcision 2/2023  -BPH: Supportive care. Bladder scan. Cont home medication regimen once reconciled per pharmacy.   -Former smoker   ---------------------------------------------------  DVT Prophylaxis: Heparin gtt   Activity: Up with assistance as tolerated   ---------------------------------------------------  INPATIENT status due to the need for care which can only be reasonably provided in an hospital setting such as aggressive/expedited ancillary services and/or consultation services, the necessity for IV medications, close physician monitoring and/or the possible need for procedures.  In such, I feel patient’s risk for adverse outcomes and need for care warrant INPATIENT evaluation and predict the patient’s care encounter to likely last beyond 2 midnights.    Code Status: FULL CODE   ---------------------------------------------------  Disposition/Discharge planning: Pending clinical course   ---------------------------------------------------  I have discussed the patient's assessment and plan with the patient, nursing staff, and attending physician Dr. Hugo DO.     Dora Marcos,  KARLA  Hospital[facility]   Pager: [phone]    08/26/23  12:25 EDT    Attending Physician: Dr. Hugo DO      ---------------------------------------------------------------------------------------------------------------------

## 2023-08-26 NOTE — CONSULTS
[facility]  CONSULT  NOTE      Patient information:  Date of Admit: 8/26/2023  Date of Consult: 08/26/23  Hospitalist/Referring MD:Ryan Arias DO;   PCP: Lurdes Estrada MD  MRN:  [id number]  Visit Number:  [id number]    LOS: 0  CODE STATUS:  Code Status and Medical Interventions:   Ordered at: 08/26/23 1151     Code Status (Patient has no pulse and is not breathing):    CPR (Attempt to Resuscitate)     Medical Interventions (Patient has pulse or is breathing):    Full Support       PROBLEM LIST: Principal Problem:    Hypertensive emergency        Assessment    1.  Hypertensive emergency, likely uncontrolled hypertension in the setting of medication noncompliance as patient has not been on any medication.  2.  Type II non-STEMI related to hypertension  3.  Chest pain  4.  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  Conditions her chest pain is likely from uncontrolled hypertension, if she continues to have chest pain after control of blood pressure will consider further ischemic evaluation.  5.  Hyperlipidemia          Recommendations   1.  We will titrate Esmolol drip down after starting patient on Coreg 12.5 twice daily, will add Imdur and Aldactone post regimen titrated off the small drip, told RN to start medication tomorrow time to avoid any significant hypotension, more than 40 mm drop in systolic blood pressure.  3.  We will continue to monitor high sensitive troponin, will check in a.m. and if there is any significant rise will consider further ischemic  "evaluation.  His transthoracic echocardiogram is currently pending.  4.  Continue statin therapy  5.  Continue aspirin therapy      Reason for Cardiology consultation: NSTEMI, hypertensive emergency    Subjective Data   ADMISSION INFORMATION:  Chief Complaint   Patient presents with    Chest Pain     Chest Pain   Pertinent negatives include no dizziness, headaches, palpitations or shortness of breath.     Giorgio Omer is a 72 y.o. male with a past medical history significant for hypertension, hyperlipidemia, peripheral vascular disease, type 2 diabetes mellitus, coronary artery disease status post four-vessel CABG in 2001 and stenting to [facility] x2 in 2022.   Patient [facility] emergency room (ER) on 8/26/2023 with complaints of chest pain.  Patient reports that this morning he developed chest pain across his chest that radiated into his left arm.  Patient describes the pain as \"heavy pressure\".  He reports that he has not been taking any of his medications at home.  Initial high sensitive troponin 57. Cardiology has been consulted for further evaluation and management.     Primary Cardiologist has been Dr. Trevino and he was last seen in the office on 10/4/22.     Patient is in room 219 and was examined by Dr. Valdivia. Patient sitting in bed resting quietly.  No acute distress noted.  Patient reports that he is still having chest pain at this time but that it has improved.  He describes it as a heavy pressure that goes across his chest.  Patient denies any shortness of breath, palpitations, or dizziness.      Known medications given enroute vis EMS and in the ER:         Cardiac risk factors:arteriosclerotic heart disease, diabetes mellitus, hypercholesterolemia, hypertension, and peripheral vascular disease      Last Echo:        Last Stress: Results for orders placed during the hospital encounter of 07/23/19    Stress Test With Myocardial Perfusion (1 Day)    Interpretation Summary  · " EKG stress within normal limits.  · Left ventricular ejection fraction is normal (Calculated EF = 70%).  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  · LVEF 50%                            Past Medical History:   Diagnosis Date    Arthritis     Coronary artery disease 10/17/2016    Surgical revascularization, Dr. Bryant, 07/01, [facility], receiving LIMA to LAD, free radial graft to PDA, saphenous graft to first diagonal, saphenous graft to obtuse marginal. MPS, May 2008, per [facility]: Normal perfusion, EF 68%.  April 2012 - exercise Cardiolite WNL, EF preserved.    Diabetes mellitus 10/17/2016    on Metformin times one year, onset 2009:  March 2015.  HG A1c 8.6.     Dyslipidemia 10/17/2016    March 2015:  Total cholesterol 143, triglycerides 326, HDL 39, LDL 38.    Hyperlipidemia     Hypertension 10/17/2016    presumed essential.     Right bundle branch block 10/17/2016    baseline     Past Surgical History:   Procedure Laterality Date    CARDIAC CATHETERIZATION N/A 08/26/2022    Procedure: LEFT HEART CATH;  Surgeon: Sriram Bowers MD;  Location:  [facility];  Service: Cardiovascular;  Laterality: N/A;    CIRCUMCISION N/A 2/27/2023    Procedure: CIRCUMCISION;  Surgeon: Himanshu Espinoza MD;  Location:  [facility] OR;  Service: Urology;  Laterality: N/A;    COLONOSCOPY      CORONARY ARTERY BYPASS GRAFT  1991     Family History   Problem Relation Age of Onset    Ovarian cancer Mother     Hypertension Father     Stroke Father      Social History     Tobacco Use    Smoking status: Former     Types:  Cigarettes     Quit date: 10/28/2001     Years since quittin.8    Smokeless tobacco: Never   Vaping Use    Vaping Use: Never used   Substance Use Topics    Alcohol use: No    Drug use: No       Medications listed below are reported home medications pulling from within the system:  Medications Prior to Admission   Medication Sig Dispense Refill Last Dose    aspirin 81 MG tablet Take 1 tablet by mouth Daily.       cephalexin (Keflex) 500 MG capsule Take 1 capsule by mouth 2 (Two) Times a Day. 8 capsule 0     clopidogrel (PLAVIX) 75 MG tablet Take 1 tablet by mouth Daily. 30 tablet 11     HYDROcodone-acetaminophen (NORCO)  MG per tablet Take 1 tablet by mouth Every 4 (Four) Hours As Needed for Moderate Pain (Pain). 12 tablet 0     insulin aspart (novoLOG) 100 UNIT/ML injection Inject  under the skin into the appropriate area as directed 3 (Three) Times a Day Before Meals.       insulin detemir (LEVEMIR) 100 UNIT/ML injection Inject 12 Units under the skin into the appropriate area as directed Daily. Dispense with needles (Patient taking differently: Inject 10 Units under the skin into the appropriate area as directed Daily. Dispense with needles) 10 mL 0     Liraglutide (VICTOZA SC) Inject  under the skin into the appropriate area as directed.       metFORMIN (GLUCOPHAGE) 1000 MG tablet Take 1 tablet by mouth 2 (Two) Times a Day With Meals.       metoprolol succinate XL (TOPROL-XL) 50 MG 24 hr tablet Take 25 mg by mouth Daily.       nitroglycerin (NITROSTAT) 0.4 MG SL tablet Place 1 tablet under the tongue Every 5 (Five) Minutes As Needed for Chest Pain. Take no more than 3 doses in 15 minutes.       rosuvastatin (CRESTOR) 40 MG tablet Take 1 tablet by mouth Every Night. 90 tablet 11     tamsulosin (Flomax) 0.4 MG capsule 24 hr capsule Take 1 capsule by mouth Every Night. 30 capsule 5      Allergies:  Cardizem [diltiazem hcl] and Celecoxib    Review of Systems   Constitutional:  Negative for fatigue.    Respiratory:  Negative for chest tightness, shortness of breath and wheezing.    Cardiovascular:  Positive for chest pain. Negative for palpitations and leg swelling.   Neurological:  Negative for dizziness, syncope, light-headedness and headaches.     Objective Data      Vital Signs  Temp:  [97 °F (36.1 °C)-98.1 °F (36.7 °C)] 98.1 °F (36.7 °C)  Heart Rate:  [] 94  Resp:  [12-21] 16  BP: (110-210)/() 110/76  Vital Signs (last 72 hrs)         08/23 0700  08/24 0659 08/24 0700  08/25 0659 08/25 0700  08/26 0659 08/26 0700  08/26 1450   Most Recent      Temp (°F)         97     97 (36.1) 08/26 0837    Heart Rate       72 -  90     88 08/26 1400    Resp       14 -  21     14 08/26 1400    BP       162/119 -  210/101     176/107 08/26 1400    SpO2 (%)       98 -  100     98 08/26 1400          Body mass index is 21.2 kg/m².    Intake/Output Summary (Last 24 hours) at 8/26/2023 1950  Last data filed at 8/26/2023 1746  Gross per 24 hour   Intake 225.33 ml   Output 100 ml   Net 125.33 ml         Physical Exam  Vitals reviewed.   Constitutional:       General: He is awake. He is not in acute distress.     Appearance: Normal appearance. He is well-developed and well-groomed.   HENT:      Head: Normocephalic.      Mouth/Throat:      Mouth: Mucous membranes are moist.   Eyes:      Pupils: Pupils are equal, round, and reactive to light.   Neck:      Vascular: No carotid bruit or JVD.   Cardiovascular:      Rate and Rhythm: Normal rate and regular rhythm.      Pulses: Normal pulses.      Heart sounds: Normal heart sounds. No murmur heard.    No S3 or S4 sounds.   Pulmonary:      Effort: Pulmonary effort is normal. No respiratory distress.      Breath sounds: Normal breath sounds. No wheezing, rhonchi or rales.   Abdominal:      General: Bowel sounds are normal.      Palpations: Abdomen is soft.      Tenderness: There is no abdominal tenderness.   Musculoskeletal:      Cervical back: Normal range of motion.       Right lower leg: No edema.      Left lower leg: No edema.   Skin:     General: Skin is warm and dry.      Capillary Refill: Capillary refill takes less than 2 seconds.   Neurological:      Mental Status: He is alert and oriented to person, place, and time. Mental status is at baseline.   Psychiatric:         Mood and Affect: Mood normal.         Speech: Speech normal.         Behavior: Behavior is cooperative.       Results review   Results Review:    I have reviewed the patient's new clinical results.  Results from last 7 days   Lab Units 23  1045 23  0841   HSTROP T ng/L 57* 20*     Results from last 7 days   Lab Units 23  0841   WBC 10*3/mm3 8.98   HEMOGLOBIN g/dL 16.0   PLATELETS 10*3/mm3 186     Results from last 7 days   Lab Units 23  0841   SODIUM mmol/L 137   POTASSIUM mmol/L 3.6   CHLORIDE mmol/L 100   CO2 mmol/L 24.5   BUN mg/dL 12   CREATININE mg/dL 0.94   CALCIUM mg/dL 9.1   GLUCOSE mg/dL 334*   ALT (SGPT) U/L 8   AST (SGOT) U/L 14     Lab Results   Component Value Date    INR 0.91 2023    INR 0.95 2023         No results found for: MG  Lab Results   Component Value Date    TSH 1.150 2023      No results found for: CHOL, TRIG, HDL, LDL  No results found for: PROBNP  No results found.  No results found for: URICACID  Pain Management Panel           No data to display              Microbiology Results (last 10 days)       ** No results found for the last 240 hours. **           No results found for: BLOODCX        EC/26/23        ECG/EMG Results (last 24 hours)       Procedure Component Value Units Date/Time    ECG 12 Lead Chest Pain [[id number]] Collected: 23     Updated: 23     QT Interval 442 ms      QTC Interval 490 ms     Narrative:      Test Reason : Chest Pain  Blood Pressure :   */*   mmHG  Vent. Rate :  74 BPM     Atrial Rate :  74 BPM     P-R Int : 178 ms          QRS Dur : 146 ms      QT Int : 442 ms       P-R-T Axes :  69 176   44 degrees     QTc Int : 490 ms    Sinus rhythm with premature atrial complexes  Indeterminate axis  Right bundle branch block , plus right ventricular hypertrophy  Abnormal ECG  When compared with ECG of 27-AUG-2022 04:28,  premature atrial complexes are now present  Borderline criteria for Lateral infarct are no longer present  Nonspecific T wave abnormality no longer evident in Inferior leads  T wave inversion less evident in Anterolateral leads    Referred By: FORD           Confirmed By:     Adult Transthoracic Echo Complete W/ Cont if Necessary Per Protocol [[id number]] Resulted: 08/26/23 1332     Updated: 08/26/23 1332                RADIOLOGY STUDIES:  Imaging Results (Last 72 Hours)       Procedure Component Value Units Date/Time    XR Chest 1 View [[id number]] Collected: 08/26/23 0932     Updated: 08/26/23 0935    Narrative:      Examination: AP portable chest     CLINICAL HISTORY: Chest pain     FINDINGS: There has been prior median sternotomy and CABG. The cardiac  silhouette is normal in size. Pulmonary vascularity is normal. Multiple  old healed left-sided rib fractures are noted. No pneumothorax or  pleural effusion. Mild left basilar scarring or atelectasis. The lungs  are otherwise clear.       Impression:      1. No acute cardiopulmonary disease.     This report was finalized on 8/26/2023 9:33 AM by Dane Landers MD.               CURRENT MEDICATIONS:  Current list of medications may not reflect those currently placed in orders that are not signed or are being held.     [START ON 8/27/2023] aspirin, 81 mg, Oral, Daily  atorvastatin, 40 mg, Oral, Nightly  carvedilol, 12.5 mg, Oral, Q12H  donepezil, 5 mg, Oral, Nightly  hydrALAZINE, 25 mg, Oral, Q8H  insulin lispro, 2-7 Units, Subcutaneous, 4x Daily AC & at Bedtime  isosorbide mononitrate, 60 mg, Oral, Q24H  senna-docusate sodium, 2 tablet, Oral, BID  sodium chloride, 10 mL, Intravenous, Q12H  spironolactone, 25 mg, Oral, Daily      esmolol,   mcg/kg/min, Last Rate: 25 mcg/kg/min (08/26/23 1820)  heparin, 12 Units/kg/hr, Last Rate: 12 Units/kg/hr (08/26/23 1324)  Pharmacy to Dose Heparin,         senna-docusate sodium **AND** polyethylene glycol **AND** bisacodyl **AND** bisacodyl    dextrose    dextrose    glucagon (human recombinant)    Pharmacy to Dose Heparin    sodium chloride    sodium chloride    sodium chloride    I have discussed this patient with Dr. Valdivia. Together, we have formed a plan of care for this patient.     Thank you very much for asking us to be involved in this patient's care.  We will follow along with you.    I have discussed the patients findings and my recommendations with patient.      ZARINA Farah   [facility] 14:50 EDT  8/26/2023            Electronically signed by ZARINA Farah, 08/26/23, 4:10 PM EDT.             Please note that portions of this note were copied and has been reviewed and is accurate as of 8/26/2023 .      Please note that portions of this note were completed with a voice recognition program.

## 2023-08-26 NOTE — PLAN OF CARE
Problem: Adult Inpatient Plan of Care  Goal: Plan of Care Review  Outcome: Ongoing, Not Progressing  Flowsheets (Taken 8/26/2023 1429)  Progress: no change  Plan of Care Reviewed With: patient  Outcome Evaluation: Pt welcomed to PCU this shift. Esmolol and heparin gtt currently infusing. A&Ox4. RA. NSR. Pt currently in bed with son at bedside verbalizing no complaints at this time. Call light within reach. Bed locked and in lowest position.  Goal: Patient-Specific Goal (Individualized)  Outcome: Ongoing, Not Progressing  Goal: Absence of Hospital-Acquired Illness or Injury  Outcome: Ongoing, Not Progressing  Intervention: Identify and Manage Fall Risk  Recent Flowsheet Documentation  Taken 8/26/2023 1330 by Rosalinda Omer RN  Safety Promotion/Fall Prevention: safety round/check completed  Taken 8/26/2023 1320 by Rosalinda Omer RN  Safety Promotion/Fall Prevention: safety round/check completed  Intervention: Prevent Skin Injury  Recent Flowsheet Documentation  Taken 8/26/2023 1320 by Rosalinda Omer RN  Skin Protection:   adhesive use limited   incontinence pads utilized   skin-to-device areas padded   skin-to-skin areas padded   tubing/devices free from skin contact  Intervention: Prevent and Manage VTE (Venous Thromboembolism) Risk  Recent Flowsheet Documentation  Taken 8/26/2023 1320 by Rosalinda Omer RN  Activity Management: (pt ambulated to bed upon tranfer from ER.)   ambulated in room   other (see comments)  VTE Prevention/Management: (heparin gtt) other (see comments)  Range of Motion: active ROM (range of motion) encouraged  Intervention: Prevent Infection  Recent Flowsheet Documentation  Taken 8/26/2023 1330 by Rosalinda Omer RN  Infection Prevention:   single patient room provided   rest/sleep promoted  Goal: Optimal Comfort and Wellbeing  Outcome: Ongoing, Not Progressing  Intervention: Provide Person-Centered Care  Recent Flowsheet Documentation  Taken 8/26/2023 1320 by Rosalinda Omer RN  Trust  Relationship/Rapport:   care explained   choices provided   emotional support provided   empathic listening provided   questions answered   questions encouraged   reassurance provided   thoughts/feelings acknowledged  Goal: Readiness for Transition of Care  Outcome: Ongoing, Not Progressing     Problem: Fall Injury Risk  Goal: Absence of Fall and Fall-Related Injury  Outcome: Ongoing, Not Progressing  Intervention: Identify and Manage Contributors  Recent Flowsheet Documentation  Taken 8/26/2023 1330 by Rosalinda Omer RN  Medication Review/Management: medications reviewed  Taken 8/26/2023 1320 by Rosalinda Omer RN  Medication Review/Management: medications reviewed  Intervention: Promote Injury-Free Environment  Recent Flowsheet Documentation  Taken 8/26/2023 1330 by Rosalinda Omer RN  Safety Promotion/Fall Prevention: safety round/check completed  Taken 8/26/2023 1320 by Rosalinda Omer RN  Safety Promotion/Fall Prevention: safety round/check completed     Problem: COPD (Chronic Obstructive Pulmonary Disease) Comorbidity  Goal: Maintenance of COPD Symptom Control  Outcome: Ongoing, Not Progressing  Intervention: Maintain COPD-Symptom Control  Recent Flowsheet Documentation  Taken 8/26/2023 1330 by Rosalinda Omer RN  Medication Review/Management: medications reviewed  Taken 8/26/2023 1320 by Rosalinda Omer RN  Supportive Measures:   active listening utilized   relaxation techniques promoted  Medication Review/Management: medications reviewed     Problem: Heart Failure Comorbidity  Goal: Maintenance of Heart Failure Symptom Control  Outcome: Ongoing, Not Progressing  Intervention: Maintain Heart Failure-Management  Recent Flowsheet Documentation  Taken 8/26/2023 1330 by Rosalinda Omer RN  Medication Review/Management: medications reviewed  Taken 8/26/2023 1320 by Rosalinda Omer RN  Medication Review/Management: medications reviewed     Problem: Hypertension Comorbidity  Goal: Blood Pressure in Desired  Range  Outcome: Ongoing, Not Progressing  Intervention: Maintain Blood Pressure Management  Recent Flowsheet Documentation  Taken 8/26/2023 1330 by Rosalinda Omer, RN  Medication Review/Management: medications reviewed  Taken 8/26/2023 1320 by Rosalinda Omer, RN  Medication Review/Management: medications reviewed   Goal Outcome Evaluation:  Plan of Care Reviewed With: patient        Progress: no change  Outcome Evaluation: Pt welcomed to PCU this shift. Esmolol and heparin gtt currently infusing. A&Ox4. RA. NSR. Pt currently in bed with son at bedside verbalizing no complaints at this time. Call light within reach. Bed locked and in lowest position.

## 2023-08-26 NOTE — PROGRESS NOTES
HEPARIN INFUSION  Giorgio Omer is a  72 y.o. male receiving heparin infusion.     Therapy for (VTE/Cardiac):   cardiac - ACS  Patient Dosing Weight: 71.7 kg  Initial Bolus (Y/N):   Y  Any Bolus (Y/N):   Y        Signs or Symptoms of Bleeding: none noted    Cardiac or Other (Not VTE)   Initial Bolus: 60 units/kg (Max 4,000 units)  Initial rate: 12 units/kg/hr (Max 1,000 units/hr)   Anti Xa Rebolus Infusion Hold time Change infusion Dose (Units/kg/hr) Next Anti Xa or aPTT Level Due   < 0.11 50 Units/kg  (4000 Units Max) None Increase by  3 Units/kg/hr 6 hours   0.11- 0.19 25 Units/kg  (2000 Units Max) None Increase by  2 Units/kg/hr 6 hours   0.2 - 0.29 0 None Increase by  1 Units/kg/hr 6 hours   0.3 - 0.5 0 None No Change 6 hours (after 2 consecutive levels in range check q24h @0700)   0.51 - 0.6 0 None Decrease by  1 Units/kg/hr 6 hours   0.61 - 0.8 0 30 Minutes Decrease by  2 Units/kg/hr 6 hours   0.81 - 1 0 60 Minutes Decrease by  3 Units/kg/hr 6 hours   >1 0 Hold  After Anti Xa less than 0.5 decrease previous rate by  4 Units/kg/hr  Every 2 hours until Anti Xa  less than 0.5 then when infusion restarts in 6 hours         Recommend anti-Xa every 6 hours.          Date   Time   Anti-Xa Current Rate (Unit/kg/hr) Bolus   (Units) Rate Change   (Unit/kg/hr) New Rate (Unit/kg/hr) Next   Anti-Xa Comments  Pump Check Daily   8/26 1235 Collected, pending result  new 4000 +12 12 1900 Discussed initial rate and programming weight with NICK Ghosh.                                                                                                                                                                                                                                  Pharmacy will continue to follow anti-Xa results and monitor for signs and symptoms of bleeding or thrombosis.    Camryn Alanis, PharmD  08/26/23 12:50 EDT

## 2023-08-27 LAB
ALBUMIN SERPL-MCNC: 3.8 G/DL (ref 3.5–5.2)
ALBUMIN/GLOB SERPL: 1.4 G/DL
ALP SERPL-CCNC: 74 U/L (ref 39–117)
ALT SERPL W P-5'-P-CCNC: 18 U/L (ref 1–41)
ANION GAP SERPL CALCULATED.3IONS-SCNC: 11.3 MMOL/L (ref 5–15)
AST SERPL-CCNC: 117 U/L (ref 1–40)
BILIRUB SERPL-MCNC: 1.4 MG/DL (ref 0–1.2)
BUN SERPL-MCNC: 16 MG/DL (ref 8–23)
BUN/CREAT SERPL: 18.4 (ref 7–25)
CALCIUM SPEC-SCNC: 9.1 MG/DL (ref 8.6–10.5)
CHLORIDE SERPL-SCNC: 102 MMOL/L (ref 98–107)
CHOLEST SERPL-MCNC: 179 MG/DL (ref 0–200)
CO2 SERPL-SCNC: 21.7 MMOL/L (ref 22–29)
CREAT SERPL-MCNC: 0.87 MG/DL (ref 0.76–1.27)
DEPRECATED RDW RBC AUTO: 39.4 FL (ref 37–54)
EGFRCR SERPLBLD CKD-EPI 2021: 91.1 ML/MIN/1.73
ERYTHROCYTE [DISTWIDTH] IN BLOOD BY AUTOMATED COUNT: 12.1 % (ref 12.3–15.4)
GLOBULIN UR ELPH-MCNC: 2.8 GM/DL
GLUCOSE BLDC GLUCOMTR-MCNC: 176 MG/DL (ref 70–130)
GLUCOSE BLDC GLUCOMTR-MCNC: 221 MG/DL (ref 70–130)
GLUCOSE BLDC GLUCOMTR-MCNC: 262 MG/DL (ref 70–130)
GLUCOSE BLDC GLUCOMTR-MCNC: 274 MG/DL (ref 70–130)
GLUCOSE BLDC GLUCOMTR-MCNC: 292 MG/DL (ref 70–130)
GLUCOSE SERPL-MCNC: 233 MG/DL (ref 65–99)
HCT VFR BLD AUTO: 45.8 % (ref 37.5–51)
HDLC SERPL-MCNC: 51 MG/DL (ref 40–60)
HGB BLD-MCNC: 15.4 G/DL (ref 13–17.7)
LDLC SERPL CALC-MCNC: 105 MG/DL (ref 0–100)
LDLC/HDLC SERPL: 2.01 {RATIO}
MAGNESIUM SERPL-MCNC: 2.2 MG/DL (ref 1.6–2.4)
MCH RBC QN AUTO: 30.3 PG (ref 26.6–33)
MCHC RBC AUTO-ENTMCNC: 33.6 G/DL (ref 31.5–35.7)
MCV RBC AUTO: 90 FL (ref 79–97)
PLATELET # BLD AUTO: 193 10*3/MM3 (ref 140–450)
PMV BLD AUTO: 12 FL (ref 6–12)
POTASSIUM SERPL-SCNC: 4.2 MMOL/L (ref 3.5–5.2)
PROT SERPL-MCNC: 6.6 G/DL (ref 6–8.5)
QT INTERVAL: 442 MS
QT INTERVAL: 494 MS
QTC INTERVAL: 490 MS
QTC INTERVAL: 494 MS
RBC # BLD AUTO: 5.09 10*6/MM3 (ref 4.14–5.8)
SODIUM SERPL-SCNC: 135 MMOL/L (ref 136–145)
TRIGL SERPL-MCNC: 127 MG/DL (ref 0–150)
TROPONIN T SERPL HS-MCNC: 1483 NG/L
UFH PPP CHRO-ACNC: 0.11 IU/ML (ref 0.3–0.7)
UFH PPP CHRO-ACNC: 0.2 IU/ML (ref 0.3–0.7)
UFH PPP CHRO-ACNC: 0.23 IU/ML (ref 0.3–0.7)
VLDLC SERPL-MCNC: 23 MG/DL (ref 5–40)
WBC NRBC COR # BLD: 10.64 10*3/MM3 (ref 3.4–10.8)

## 2023-08-27 PROCEDURE — 85520 HEPARIN ASSAY: CPT | Performed by: STUDENT IN AN ORGANIZED HEALTH CARE EDUCATION/TRAINING PROGRAM

## 2023-08-27 PROCEDURE — 99232 SBSQ HOSP IP/OBS MODERATE 35: CPT | Performed by: INTERNAL MEDICINE

## 2023-08-27 PROCEDURE — 99233 SBSQ HOSP IP/OBS HIGH 50: CPT | Performed by: STUDENT IN AN ORGANIZED HEALTH CARE EDUCATION/TRAINING PROGRAM

## 2023-08-27 PROCEDURE — 84484 ASSAY OF TROPONIN QUANT: CPT | Performed by: STUDENT IN AN ORGANIZED HEALTH CARE EDUCATION/TRAINING PROGRAM

## 2023-08-27 PROCEDURE — 25010000002 HEPARIN (PORCINE) 25000-0.45 UT/250ML-% SOLUTION

## 2023-08-27 PROCEDURE — 93005 ELECTROCARDIOGRAM TRACING: CPT | Performed by: INTERNAL MEDICINE

## 2023-08-27 PROCEDURE — 93010 ELECTROCARDIOGRAM REPORT: CPT | Performed by: INTERNAL MEDICINE

## 2023-08-27 PROCEDURE — 63710000001 INSULIN LISPRO (HUMAN) PER 5 UNITS: Performed by: STUDENT IN AN ORGANIZED HEALTH CARE EDUCATION/TRAINING PROGRAM

## 2023-08-27 PROCEDURE — 82948 REAGENT STRIP/BLOOD GLUCOSE: CPT

## 2023-08-27 PROCEDURE — 25010000002 HEPARIN (PORCINE) PER 1000 UNITS

## 2023-08-27 PROCEDURE — 63710000001 INSULIN LISPRO (HUMAN) PER 5 UNITS: Performed by: PHYSICIAN ASSISTANT

## 2023-08-27 PROCEDURE — 80061 LIPID PANEL: CPT | Performed by: PHYSICIAN ASSISTANT

## 2023-08-27 PROCEDURE — 80053 COMPREHEN METABOLIC PANEL: CPT | Performed by: PHYSICIAN ASSISTANT

## 2023-08-27 PROCEDURE — 85027 COMPLETE CBC AUTOMATED: CPT | Performed by: PHYSICIAN ASSISTANT

## 2023-08-27 PROCEDURE — 83735 ASSAY OF MAGNESIUM: CPT | Performed by: PHYSICIAN ASSISTANT

## 2023-08-27 RX ORDER — HEPARIN SODIUM 5000 [USP'U]/ML
25 INJECTION, SOLUTION INTRAVENOUS; SUBCUTANEOUS ONCE
Status: COMPLETED | OUTPATIENT
Start: 2023-08-27 | End: 2023-08-27

## 2023-08-27 RX ORDER — ROSUVASTATIN CALCIUM 20 MG/1
40 TABLET, COATED ORAL NIGHTLY
Status: DISCONTINUED | OUTPATIENT
Start: 2023-08-27 | End: 2023-08-29 | Stop reason: HOSPADM

## 2023-08-27 RX ADMIN — ASPIRIN 81 MG: 81 TABLET, CHEWABLE ORAL at 08:21

## 2023-08-27 RX ADMIN — INSULIN LISPRO 3 UNITS: 100 INJECTION, SOLUTION INTRAVENOUS; SUBCUTANEOUS at 22:22

## 2023-08-27 RX ADMIN — Medication 10 ML: at 08:21

## 2023-08-27 RX ADMIN — HEPARIN SODIUM 1800 UNITS: 5000 INJECTION INTRAVENOUS; SUBCUTANEOUS at 13:23

## 2023-08-27 RX ADMIN — DONEPEZIL HYDROCHLORIDE 5 MG: 5 TABLET, FILM COATED ORAL at 22:22

## 2023-08-27 RX ADMIN — ROSUVASTATIN CALCIUM 40 MG: 20 TABLET, FILM COATED ORAL at 22:22

## 2023-08-27 RX ADMIN — HEPARIN SODIUM 16 UNITS/KG/HR: 10000 INJECTION, SOLUTION INTRAVENOUS at 16:06

## 2023-08-27 RX ADMIN — METOPROLOL TARTRATE 12.5 MG: 25 TABLET, FILM COATED ORAL at 22:22

## 2023-08-27 RX ADMIN — Medication 10 ML: at 22:23

## 2023-08-27 RX ADMIN — INSULIN LISPRO 4 UNITS: 100 INJECTION, SOLUTION INTRAVENOUS; SUBCUTANEOUS at 11:23

## 2023-08-27 RX ADMIN — METOPROLOL TARTRATE 12.5 MG: 25 TABLET, FILM COATED ORAL at 08:20

## 2023-08-27 RX ADMIN — INSULIN LISPRO 4 UNITS: 100 INJECTION, SOLUTION INTRAVENOUS; SUBCUTANEOUS at 18:33

## 2023-08-27 RX ADMIN — INSULIN LISPRO 4 UNITS: 100 INJECTION, SOLUTION INTRAVENOUS; SUBCUTANEOUS at 08:21

## 2023-08-27 RX ADMIN — DOCUSATE SODIUM 50 MG AND SENNOSIDES 8.6 MG 2 TABLET: 8.6; 5 TABLET, FILM COATED ORAL at 22:22

## 2023-08-27 NOTE — PROGRESS NOTES
"    [facility]  PROGRESS NOTE      Patient information:  Name: Giorgio Omer  Age/Sex: 72 y.o. male  :  1950        PCP: Lurdes Estrada MD  Attending: Ryan Arias DO  MRN:  [id number]  Visit Number:  [id number]    LOS:  LOS: 1 day   CODE STATUS:    Code Status and Medical Interventions:   Ordered at: 23 1151     Code Status (Patient has no pulse and is not breathing):    CPR (Attempt to Resuscitate)     Medical Interventions (Patient has pulse or is breathing):    Full Support       PROBLEM LIST:Principal Problem:    Hypertensive emergency      Reason for Cardiology follow-up: Hypertensive emergency, NSTEMI    Subjective   ADMISSION INFORMATION:  Chief Complaint   Patient presents with    Chest Pain       HPI:  Giorgio Omer is a 72 y.o. male with a past medical history significant for hypertension, hyperlipidemia, peripheral vascular disease, type 2 diabetes mellitus, coronary artery disease status post four-vessel CABG in  and stenting to SVG's x2 in .   Patient presented to [facility] ([facility]) emergency room (ER) on 2023 with complaints of chest pain.  Patient reports that this morning he developed chest pain across his chest that radiated into his left arm.  Patient describes the pain as \"heavy pressure\".  He reports that he has not been taking any of his medications at home.  Initial high sensitive troponin 57. Cardiology has been consulted for further evaluation and management.      Primary Cardiologist has been Dr. Trevino and he was last seen in the office on 10/4/22.     Interval History:   Patient is in room 219 and was examined by Dr. Valdivia.  Patient sitting up in bed.  Son at bedside.  No acute distress noted.  Patient denies any chest pain, shortness of breath, palpitations, or dizziness at this time.    Vital Signs  Temp:  [96.5 °F (35.8 °C)-98.9 °F (37.2 °C)] 98.6 °F (37 °C)  Heart Rate:  [] 61  Resp:  " [11-19] 18  BP: ()/() 96/67  Vital Signs (last 72 hrs)         08/24 0700  08/25 0659 08/25 0700  08/26 0659 08/26 0700 08/27 0659 08/27 0700 08/27 1633   Most Recent      Temp (°F)     97 -  98.9    96.5 -  98.6     98.6 (37) 08/27 1200    Heart Rate     60 -  104    56 -  78     61 08/27 1600    Resp     11 -  21    14 -  18     18 08/27 1600    BP     57/42 -  210/101    96/56 -  121/67     96/67 08/27 1600    SpO2 (%)     94 -  100    97 -  99     97 08/27 1600          Body mass index is 21.15 kg/m².    Intake/Output Summary (Last 24 hours) at 8/27/2023 1633  Last data filed at 8/27/2023 1625  Gross per 24 hour   Intake 1418.9 ml   Output 850 ml   Net 568.9 ml       Objective     Physical Exam:      General Appearance:    Alert, cooperative, in no acute distress.   Head:    Normocephalic, without obvious abnormality, atraumatic.   Eyes:                          Conjunctivae and sclerae normal, no icterus, no pallor, corneas clear.   Neck:   No adenopathy, supple, trachea midline, no thyromegaly, no carotid bruit, no JVD.   Lungs:     Clear to auscultation, respirations regular, even and             unlabored.    Heart:    Regular rhythm and normal rate, normal S1 and S2, no          murmur, no gallop, no rub, no click   Chest Wall:    No abnormalities observed.   Abdomen:     Normal bowel sounds, no masses, no organomegaly, soft nontender, nondistended, no guarding, no rebound tenderness.   Extremities:   Moves all extremities well, no edema, no cyanosis, no            redness.   Pulses:   Pulses palpable and equal bilaterally.   Skin:   No bleeding, bruising or rash.   Neurologic:   Alert and Oriented x 3, Speech Clear & comprehensive.       Results review   Results Review:   Results from last 7 days   Lab Units 08/27/23 0419 08/26/23 0841   WBC 10*3/mm3 10.64 8.98   HEMOGLOBIN g/dL 15.4 16.0   PLATELETS 10*3/mm3 193 186     Results from last 7 days   Lab Units 08/27/23 0419 08/26/23  0841    SODIUM mmol/L 135* 137   POTASSIUM mmol/L 4.2 3.6   CHLORIDE mmol/L 102 100   CO2 mmol/L 21.7* 24.5   BUN mg/dL 16 12   CREATININE mg/dL 0.87 0.94   CALCIUM mg/dL 9.1 9.1   GLUCOSE mg/dL 233* 334*   ALT (SGPT) U/L 18 8   AST (SGOT) U/L 117* 14     Results from last 7 days   Lab Units 08/27/23  0419 08/26/23  1045 08/26/23  0841   HSTROP T ng/L 1,483* 57* 20*     Lab Results   Component Value Date    PROBNP 549.1 08/26/2023     No results found.     Lab Results   Component Value Date    INR 0.91 08/26/2023    INR 0.95 07/02/2023     Lab Results   Component Value Date    MG 2.2 08/27/2023     Lab Results   Component Value Date    TSH 1.150 08/26/2023      Total Cholesterol   Date Value Ref Range Status   08/27/2023 179 0 - 200 mg/dL Final     Triglycerides   Date Value Ref Range Status   08/27/2023 127 0 - 150 mg/dL Final     HDL Cholesterol   Date Value Ref Range Status   08/27/2023 51 40 - 60 mg/dL Final     LDL Cholesterol    Date Value Ref Range Status   08/27/2023 105 (H) 0 - 100 mg/dL Final     Pain Management Panel           No data to display              Microbiology Results (last 10 days)       ** No results found for the last 240 hours. **           Imaging Results (Last 24 Hours)       ** No results found for the last 24 hours. **            ECHO:  Results for orders placed during the hospital encounter of 08/26/23    Adult Transthoracic Echo Complete W/ Cont if Necessary Per Protocol    Interpretation Summary    Left ventricular systolic function is normal. Left ventricular ejection fraction appears to be 51 - 55%.    Moderate mitral valve regurgitation is present.    Estimated right ventricular systolic pressure from tricuspid regurgitation is normal (<35 mmHg).    There is no evidence of pericardial effusion      STRESS TEST:  Results for orders placed during the hospital encounter of 07/23/19    Stress Test With Myocardial Perfusion (1 Day)    Interpretation Summary  · EKG stress within normal  limits.  · Left ventricular ejection fraction is normal (Calculated EF = 70%).  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  · LVEF 50%      EK/27/23      TELEMETRY:         Sinus rhythm 60s        I reviewed the patient's new clinical results.    Medication Review:   Current list of medications may not reflect those currently placed in orders that are not signed or are being held.     aspirin, 81 mg, Oral, Daily  atorvastatin, 40 mg, Oral, Nightly  donepezil, 5 mg, Oral, Nightly  insulin lispro, 2-7 Units, Subcutaneous, 4x Daily AC & at Bedtime  metoprolol tartrate, 12.5 mg, Oral, Q12H  senna-docusate sodium, 2 tablet, Oral, BID  sodium chloride, 10 mL, Intravenous, Q12H  sodium chloride, 10 mL, Intravenous, Q12H      heparin, 16 Units/kg/hr, Last Rate: 16 Units/kg/hr (23 1606)  Pharmacy to Dose Heparin,         senna-docusate sodium **AND** polyethylene glycol **AND** bisacodyl **AND** bisacodyl    dextrose    dextrose    glucagon (human recombinant)    Pharmacy to Dose Heparin    sodium chloride    sodium chloride    sodium chloride    sodium chloride    sodium chloride    Assessment      1.  Hypertension,controlled  2.  NSTEMI, significant delta in troponin  3.  Chest pain  4.  Coronary artery disease   5.  Hyperlipidemia        Plan   1.  We will continue metoprolol 12.5 mg every 12 hours, derek recommend one MD to manage anti hypertensive medications given multiple changes and significant symptomatic hypotension , I dont believe pt has refractory hypotension given his BP is controlled with  < 2 medications  2.  We will schedule patient for heart cath in the a.m. related to increased high-sensitivity troponin T.  Keep patient n.p.o. after midnight  3.  Continue statin therapy  4.  Continue with IV heparin and aspirin therapy              I have discussed this patient with Dr. Valdivia. Together, we have formed a plan of care for this patient.     I have discussed the patients findings and my recommendations with patient.    ZARINA Farah   [facility] Cardiology 16:33 EDT 8/27/2023              Electronically signed by ZARINA Farah, 08/27/23, 4:44 PM EDT.           Please note that portions of this note were completed with a voice recognition program.    Please note that portions of this note were copied and has been reviewed and is accurate as of 8/27/2023 .

## 2023-08-27 NOTE — PROGRESS NOTES
[facility] HOSPITALIST PROGRESS NOTE     Patient Identification:  Name:  Giorgio Omer  Age:  72 y.o.  Sex:  male  :  1950  MRN:  [id number]  Visit Number:  [id number]  ROOM: [address] [place] [address]     Primary Care Provider:  Lurdes Estrada MD    Length of stay in inpatient status:  1    Subjective     Chief Compliant:    Chief Complaint   Patient presents with    Chest Pain       History of Presenting Illness: Patient seen and evaluated in follow-up for NSTEMI, type I versus type II, with chest pain and hypertensive emergency with known coronary artery disease status post CABG who recently stopped taking his medications after running out of them 2 weeks prior.  Patient time exam today with normotension and denying any chest pain.  Cardiology has seen and evaluated and tentatively planning for heart cath tomorrow.    Objective     Current Hospital Meds:  aspirin, 81 mg, Oral, Daily  atorvastatin, 40 mg, Oral, Nightly  donepezil, 5 mg, Oral, Nightly  insulin lispro, 2-7 Units, Subcutaneous, 4x Daily AC & at Bedtime  metoprolol tartrate, 12.5 mg, Oral, Q12H  senna-docusate sodium, 2 tablet, Oral, BID  sodium chloride, 10 mL, Intravenous, Q12H  sodium chloride, 10 mL, Intravenous, Q12H      heparin, 16 Units/kg/hr, Last Rate: 16 Units/kg/hr (23 1606)  Pharmacy to Dose Heparin,       ----------------------------------------------------------------------------------------------------------------------  Vital Signs:  Temp:  [96.5 °F (35.8 °C)-98.9 °F (37.2 °C)] 98.6 °F (37 °C)  Heart Rate:  [] 61  Resp:  [11-19] 18  BP: ()/() 96/67  SpO2:  [94 %-100 %] 97 %  on   ;   Device (Oxygen Therapy): room air  Body mass index is 21.15 kg/m².      Intake/Output Summary (Last 24 hours) at 2023 1637  Last data filed at 2023 1625  Gross per 24 hour   Intake 1418.9 ml   Output 850 ml   Net 568.9 ml       ----------------------------------------------------------------------------------------------------------------------  Physical exam:  Constitutional:  Well-developed and well-nourished.  No acute distress.      HENT:  Head:  Normocephalic and atraumatic.  Mouth:  Moist mucous membranes.    Eyes:  Conjunctivae and EOM are normal. No scleral icterus.    Neck:  Neck supple.  No JVD present.    Cardiovascular:  Normal rate, regular rhythm and normal heart sounds with no murmur.  Pulmonary/Chest:  No respiratory distress, no wheezes, no crackles, with normal breath sounds and good air movement.  Abdominal:  Soft.  Bowel sounds are normal.  No distension and no tenderness.   Musculoskeletal:  No edema, no tenderness, and no deformity.  No red or swollen joints anywhere.  Functional ROM intact.   Neurological:  Alert and oriented to person, place, and time.  No cranial nerve deficit.  No tongue deviation.  No facial droop.  No slurred speech. Intact Sensation throughout  Skin:  Skin is warm and dry. No rash or lesion noted. No pallor.   Peripheral vascular:  Pulses in all 4 extremities with no clubbing, no cyanosis, no edema.  Psychiatric: Appropriate mood and affect, pleasant.   ----------------------------------------------------------------------------------------------------------------------  WBC/HGB/HCT/PLT   10.64/15.4/45.8/193 (08/27 0419)  BUN/CREAT/GLUC/ALT/AST/WAYNE/LIP    16/0.87/233/18/117/--/-- (08/27 0419)  LYTES - Na/K/Cl/CO2: 135*/4.2/102/21.7* (08/27 0419)        No results found for: URINECX  No results found for: BLOODCX    I have personally looked at the labs and they are summarized above.  ----------------------------------------------------------------------------------------------------------------------  Detailed radiology reports for the last 24 hours:  XR Chest 1 View    Result Date: 8/26/2023  1. No acute cardiopulmonary disease.  This report was finalized on 8/26/2023 9:33 AM by Dane Landers,  MD.     Assessment & Plan      NSTEMI, type I versus type II  Chest pain, resolved  Hypertensive emergency  CAD s/p CABG (2001) s/p stenting    -[facility] admitting to [facility] and then forgetting to  or obtain refills of medications and has been off all medications for the last 2 weeks.    -Initially on esmolol drip due to history of allergy to calcium channel blockers but able to be titrated off.  Patient well-controlled on metoprolol monotherapy.    -Patient with no complaints of chest pain today however a.m. troponin elevated at 1483 increased from 57 the day prior.    -Cardiology consulted and following along and planning for heart cath tomorrow.    -TTE obtained on 8/26/2023 shows an EF of 51 to 55% with moderate mitral valve regurg with normal RVSP and no evidence of pericardial effusion with normal systolic function..    -Continue heparin given concern for type I NSTEMI.  Continue aspirin and statin.    Diabetes mellitus type 2    -Hemoglobin A1c returning at 10.2    -Continue basal and bolus insulin with sliding scale insulin and will add Lantus today.      Hyperlipidemia    -Continue statin    Peripheral arterial disease    -Previous arterial Doppler from [facility] on 7/2/2023 showed long segment SFA occlusion with poor flow in the infrapopliteal circulation.  Patient was seen in the ED at that time and consultation obtained with vascular surgery they recommended Xarelto 2.5 twice daily and outpatient follow-up with vascular surgery either in [place] or [place].  Patient reports being on anticoagulation for a couple months before stopping which would not be typical standard of care for occlusive arterial disease.  [facility] [place] [facility] said [facility] up.    History of phimosis s/p circumcision  Benign prostatic hypertrophy  Former smoker        Copied text in portions of the note has  been reviewed and is accurate as of 08/27/23    VTE Prophylaxis:   Mechanical Order History:       None          Pharmalogical Order History:        Ordered     Dose Route Frequency Stop    08/27/23 1315  heparin (porcine) 5000 UNIT/ML injection 1,800 Units         25 Units/kg (Order-Specific) IV Once 08/27/23 1323    08/26/23 1138  heparin (porcine) 5000 UNIT/ML injection 4,000 Units         4,000 Units IV Once 08/26/23 1302    08/26/23 1138  heparin 99316 units/250 mL (100 units/mL) in 0.45 % NaCl infusion  11.47 mL/hr         16 Units/kg/hr IV Titrated --    08/26/23 1138  heparin (porcine) 5000 UNIT/ML injection 3,600 Units  Status:  Discontinued         50 Units/kg IV As Needed 08/26/23 1145    08/26/23 1138  heparin (porcine) 5000 UNIT/ML injection 1,800 Units  Status:  Discontinued         25 Units/kg IV As Needed 08/26/23 1145    08/26/23 1138  Pharmacy to Dose Heparin         -- XX Continuous PRN --                    Disposition pending clinical course and left heart cath tomorrow.    Ryan Arias DO  [facility] Hospitalist  08/27/23  16:37 EDT

## 2023-08-27 NOTE — PROGRESS NOTES
HEPARIN INFUSION  Giorgio Omer is a  72 y.o. male receiving heparin infusion.     Therapy for (VTE/Cardiac):   cardiac - ACS  Patient Dosing Weight: 71.7 kg  Initial Bolus (Y/N):   Y  Any Bolus (Y/N):   Y        Signs or Symptoms of Bleeding: none noted    Cardiac or Other (Not VTE)   Initial Bolus: 60 units/kg (Max 4,000 units)  Initial rate: 12 units/kg/hr (Max 1,000 units/hr)   Anti Xa Rebolus Infusion Hold time Change infusion Dose (Units/kg/hr) Next Anti Xa or aPTT Level Due   < 0.11 50 Units/kg  (4000 Units Max) None Increase by  3 Units/kg/hr 6 hours   0.11- 0.19 25 Units/kg  (2000 Units Max) None Increase by  2 Units/kg/hr 6 hours   0.2 - 0.29 0 None Increase by  1 Units/kg/hr 6 hours   0.3 - 0.5 0 None No Change 6 hours (after 2 consecutive levels in range check q24h @0700)   0.51 - 0.6 0 None Decrease by  1 Units/kg/hr 6 hours   0.61 - 0.8 0 30 Minutes Decrease by  2 Units/kg/hr 6 hours   0.81 - 1 0 60 Minutes Decrease by  3 Units/kg/hr 6 hours   >1 0 Hold  After Anti Xa less than 0.5 decrease previous rate by  4 Units/kg/hr  Every 2 hours until Anti Xa  less than 0.5 then when infusion restarts in 6 hours         Recommend anti-Xa every 6 hours.          Date   Time   Anti-Xa Current Rate (Unit/kg/hr) Bolus   (Units) Rate Change   (Unit/kg/hr) New Rate (Unit/kg/hr) Next   Anti-Xa Comments  Pump Check Daily   8/26 1235 < 0.1  new 4000 +12 12 1900 Discussed initial rate and programming weight with NICK Ghosh.    08/26 2200 0.24 12 - +1 13 0400 Increased rate, no s/s bleeding per Angelina Hernandez RN     08/27 0457 0.20 13 - +1 14 1100 Increased ludmila, no s/s bleeding per Angelina Vela RN    8/27 1103 0.11 14 1800 +2 16 1930 Discussed rate change, programming weight, and bolus with NICK Ramirez. No s/s bleeding.                                                                                                                                                                                                 Pharmacy  will continue to follow anti-Xa results and monitor for signs and symptoms of bleeding or thrombosis.    Thank you.  Camryn Alanis, Pharm.D.  8/27/2023  13:19 EDT

## 2023-08-27 NOTE — PAYOR COMM NOTE
"[facility]  MITESH SOSA  PHONE  [phone]  FAX  [phone]  NPI:  [id number]    ADMISSION NOTIFICATION    Rosario Omer JANETTE (72 y.o. Male)       Date of Birth   1950    Social Security Number       Address   [address] [place] [address]    Home Phone   [phone]    MRN   [id number]       Denominational   [employer]    Marital Status                               Admission Date   8/26/23    Admission Type   Emergency    Admitting Provider   Ryan Arias DO    Attending Provider   Ryan Arias DO    Department, Room/Bed   [facility] PROGRESS CARE, P219/1P       Discharge Date       Discharge Disposition       Discharge Destination                                 Attending Provider: Ryan Arias DO    Allergies: Cardizem [Diltiazem Hcl], Celecoxib    Isolation: None   Infection: None   Code Status: CPR    Ht: 185.4 cm (73\")   Wt: 72.7 kg (160 lb 4.4 oz)    Admission Cmt: None   Principal Problem: Hypertensive emergency [I16.1]                   Active Insurance as of 8/26/2023       Primary Coverage       Payor Plan Insurance Group Employer/Plan Group    MEDICARE RAILROAD MEDICARE        Payor Plan Address Payor Plan Phone Number Payor Plan Fax Number Effective Dates    [address] [phone]  10/1/2014 - None Entered    [facility] [id number]         Subscriber Name Subscriber Birth Date Member ID       ROSARIO OMER 1950 [id number]               Secondary Coverage       Payor Plan Insurance Group Employer/Plan Group    [facility] [id number]       Coverage Address Coverage Phone Number Coverage Fax Number Effective Dates    [address] [phone]  5/26/2016 - None Entered    [facility] [id number]         Subscriber Name Subscriber Birth Date Member ID       ROSARIO OMER JANETTE 1950 [id number]               Tertiary Coverage       Payor Plan Insurance Group Employer/Plan Group    [place] [id number]   "      Payor Plan Address Payor Plan Phone Number Payor Plan Fax Number Effective Dates    [facility] OFFICE OF COMMUNITY CARE [phone]  2022 - None Entered    [address] [address]       [facility] [address]         Subscriber Name Subscriber Birth Date Member ID       ROSARIO OMER 1950 [id number]                     Emergency Contacts        (Rel.) Home Phone Work Phone Mobile Phone    Oscar Omer (Son) -- -- [phone]    Ab Omer (Brother) -- -- [phone]    Florentin Omer (Brother) -- -- [phone]                 History & Physical        ODora Holden PA at 23 1227       Attestation signed by Ryan Arias DO at 23 1717    Patient seen and examined separately and freely admits to lack of medications at home for the last 2 weeks with development of chest pain today shortly after awakening.  Patient does report some change in pain character with movement of the upper extremity although not reproducible at time of my examination.  Certainly troponin elevation/NSTEMI could be secondary to patient's elevated blood pressure but given his cardiac history and not being on any medications for the last 2 weeks this would raise concerns for underlying cardiac origin.  Cardiology consulted and appreciate recommendations.  Started on Coreg by cardiology while on esmolol drip and will attempt to titrate off and will also add hydralazine as patient has had reasonable lowering of blood pressure and can pursue more aggressive pursuit of normotension this evening.    I have reviewed this documentation and agree.                       [facility] Medicine Services  HISTORY & PHYSICAL    Patient Identification:  Name:  Rosario Omer  Age:  72 y.o.  Sex:  male  :  1950  MRN:  [id number]   Visit Number:  [id number]  Admit Date: 2023   Primary Care Physician:  Lurdes Estrada MD     Subjective     Chief complaint:   Chief Complaint   Patient presents with     Chest Pain     History of presenting illness:   Patient is a 72 y.o. male with past medical history significant for coronary artery disease s/p CABG and stenting in the past, essential hypertension, hyperlipidemia, PVD, insulin dependent type II diabetes mellitus, history of phimosis s/p recent circumcision 2/2023, BPH, and former tobacco abuse that presented to the [facility] emergency department for evaluation of chest pain. Patient states that shortly after waking up this morning, while still in bed, he developed acute onset chest pain. Patient states the chest pain was across his entire anterior chest, felt like a hammer hitting him. He reports associated nausea and diaphoresis, no emesis. He states he was concerned he might be having a heart attack, thus he came to ED for evaluation. He reports taking NTG without relief of symptoms. He reports symptoms being constant and still present currently with only minimal improvement since arrival. He denies any palpitations. Denies any radiation of the pain. Denies any associated shortness of breath. He denies any headaches or dizziness, no LOC. Denies any abdominal pain or change in BMs. Denies any cough or upper respiratory complaints. Denies any urinary complaints. He does report not taking any of his home medications in the past two weeks. He states that two weeks ago he ran out of his home medications, he reports having other things on his mind and not [facility]     Upon arrival to the ED, vitals were temperature 97, HR 72, RR 20, /101, and oxygen saturation 100% on room air. Initial HS troponin T 20 with repeat at 57, positive delta of 37. CMP with glucose 334, otherwise unremarkable. CBC grossly unremarkable. CXR with no acute cardiopulmonary disease. Known ED medication administration: 324 mg PO aspirin x 1, heparin bolus 4000 units IV followed by gtt per protocol, 10 mg IV labetalol x 2, Esmolol gtt per  protocol.     Patient has been admitted to the progressive care unit for further evaluation and treatment.     Present during exam: patient's son   ---------------------------------------------------------------------------------------------------------------------   Review of Systems   Constitutional:  Positive for diaphoresis. Negative for activity change, appetite change, chills, fatigue and fever.   HENT:  Negative for congestion and sore throat.    Eyes:  Negative for discharge and visual disturbance.   Respiratory:  Negative for cough, chest tightness, shortness of breath and wheezing.    Cardiovascular:  Positive for chest pain. Negative for palpitations and leg swelling.   Gastrointestinal:  Positive for nausea. Negative for abdominal pain, constipation, diarrhea and vomiting.   Genitourinary:  Negative for decreased urine volume, dysuria, frequency and urgency.   Musculoskeletal:  Negative for arthralgias and myalgias.   Skin:  Negative for color change and wound.   Neurological:  Negative for dizziness, syncope, weakness, light-headedness and headaches.   Psychiatric/Behavioral:  Negative for confusion. The patient is not nervous/anxious.     ---------------------------------------------------------------------------------------------------------------------   Past Medical History:   Diagnosis Date    Arthritis     Coronary artery disease 10/17/2016    Surgical revascularization, Dr. Bryant, 07/01, [facility], receiving LIMA to LAD, free radial graft to PDA, saphenous graft to first diagonal, saphenous graft to obtuse marginal. MPS, May 2008, per [facility]: Normal perfusion, EF 68%.  April 2012 - exercise Cardiolite WNL, EF preserved.    Diabetes mellitus 10/17/2016    on Metformin times one year, onset 2009:  March 2015.  HG A1c 8.6.     Dyslipidemia 10/17/2016    March 2015:  Total cholesterol 143, triglycerides 326, HDL 39, LDL 38.    Hyperlipidemia     Hypertension 10/17/2016    presumed essential.     Right  bundle branch block 10/17/2016    baseline     Past Surgical History:   Procedure Laterality Date    CARDIAC CATHETERIZATION N/A 2022    Procedure: LEFT HEART CATH;  Surgeon: Sriram Bowers MD;  Location:  [facility];  Service: Cardiovascular;  Laterality: N/A;    CIRCUMCISION N/A 2023    Procedure: CIRCUMCISION;  Surgeon: Himanshu Espinoza MD;  Location:  [facility] OR;  Service: Urology;  Laterality: N/A;    COLONOSCOPY      CORONARY ARTERY BYPASS GRAFT       Family History   Problem Relation Age of Onset    Ovarian cancer Mother     Hypertension Father     Stroke Father      Social History     Socioeconomic History    Marital status:    Tobacco Use    Smoking status: Former     Types: Cigarettes     Quit date: 10/28/2001     Years since quittin.8    Smokeless tobacco: Never   Vaping Use    Vaping Use: Never used   Substance and Sexual Activity    Alcohol use: No    Drug use: No    Sexual activity: Defer     ---------------------------------------------------------------------------------------------------------------------   Allergies:  Cardizem [diltiazem hcl] and Celecoxib  ---------------------------------------------------------------------------------------------------------------------   Medications below are reported home medications pulling from within the system; at this time, these medications have not been reconciled unless otherwise specified and are in the verification process for further verifcation as current home medications.    Prior to Admission Medications       Prescriptions Last Dose Informant Patient Reported? Taking?    aspirin 81 MG tablet   Yes No    Take 1 tablet by mouth Daily.    cephalexin (Keflex) 500 MG capsule   No No    Take 1 capsule by mouth 2 (Two) Times a Day.    clopidogrel (PLAVIX) 75 MG tablet   No No    Take 1 tablet by mouth Daily.    HYDROcodone-acetaminophen (NORCO)  MG per tablet   No No    Take 1 tablet by mouth  Every 4 (Four) Hours As Needed for Moderate Pain (Pain).    insulin aspart (novoLOG) 100 UNIT/ML injection   Yes No    Inject  under the skin into the appropriate area as directed 3 (Three) Times a Day Before Meals.    insulin detemir (LEVEMIR) 100 UNIT/ML injection   No No    Inject 12 Units under the skin into the appropriate area as directed Daily. Dispense with needles    Patient taking differently:  Inject 10 Units under the skin into the appropriate area as directed Daily. Dispense with needles    Liraglutide (VICTOZA SC)   Yes No    Inject  under the skin into the appropriate area as directed.    metFORMIN (GLUCOPHAGE) 1000 MG tablet   Yes No    Take 1 tablet by mouth 2 (Two) Times a Day With Meals.    metoprolol succinate XL (TOPROL-XL) 50 MG 24 hr tablet   Yes No    Take 25 mg by mouth Daily.    nitroglycerin (NITROSTAT) 0.4 MG SL tablet   Yes No    Place 1 tablet under the tongue Every 5 (Five) Minutes As Needed for Chest Pain. Take no more than 3 doses in 15 minutes.    rosuvastatin (CRESTOR) 40 MG tablet   No No    Take 1 tablet by mouth Every Night.    tamsulosin (Flomax) 0.4 MG capsule 24 hr capsule   No No    Take 1 capsule by mouth Every Night.          ---------------------------------------------------------------------------------------------------------------------    Objective     Hospital Scheduled Meds:  heparin (porcine), 4,000 Units, Intravenous, Once      esmolol,  mcg/kg/min  heparin, 12 Units/kg/hr  Pharmacy to Dose Heparin,     Current listed hospital scheduled medications may not yet reflect those currently placed in orders that are signed and held, awaiting patient's arrival to floor/unit.    ---------------------------------------------------------------------------------------------------------------------   Vital Signs:  Temp:  [97 °F (36.1 °C)] 97 °F (36.1 °C)  Heart Rate:  [72-87] 87  Resp:  [20] 20  BP: (175-210)/(101-124) 175/113  Mean Arterial Pressure (Non-Invasive) for  the past 24 hrs (Last 3 readings):   Noninvasive MAP (mmHg)   08/26/23 1130 118   08/26/23 1120 139   08/26/23 1043 146     SpO2 Percentage    08/26/23 1043 08/26/23 1120 08/26/23 1130   SpO2: 99% 100% 99%     SpO2:  [99 %-100 %] 99 %  on   ;   Device (Oxygen Therapy): room air    Body mass index is 20.56 kg/m².  Wt Readings from Last 3 Encounters:   08/26/23 71.7 kg (158 lb)   03/07/23 78.5 kg (173 lb)   02/27/23 78.7 kg (173 lb 9.6 oz)       ---------------------------------------------------------------------------------------------------------------------   Physical Exam:  Physical Exam  Nursing note reviewed.   Constitutional:       General: He is awake. He is not in acute distress.     Appearance: He is well-developed. He is not toxic-appearing.      Comments: Sitting up in bed. No acute distress noted. Son present at bedside.    HENT:      Head: Normocephalic and atraumatic.      Mouth/Throat:      Mouth: Mucous membranes are moist.   Eyes:      Conjunctiva/sclera: Conjunctivae normal.      Pupils: Pupils are equal, round, and reactive to light.   Cardiovascular:      Rate and Rhythm: Normal rate and regular rhythm.      Pulses:           Dorsalis pedis pulses are 2+ on the right side and 2+ on the left side.      Heart sounds: Normal heart sounds. No murmur heard.    No friction rub. No gallop.   Pulmonary:      Effort: Pulmonary effort is normal. No tachypnea, accessory muscle usage or respiratory distress.      Breath sounds: Normal breath sounds and air entry. No wheezing, rhonchi or rales.      Comments: On room air. Speaks in full sentences without dyspnea.  Abdominal:      General: Bowel sounds are normal. There is no distension.      Palpations: Abdomen is soft.      Tenderness: There is no abdominal tenderness.   Musculoskeletal:      Cervical back: Neck supple.      Right lower leg: No edema.      Left lower leg: No edema.   Skin:     General: Skin is warm and dry.      Capillary Refill: Capillary  refill takes less than 2 seconds.   Neurological:      General: No focal deficit present.      Mental Status: He is alert and oriented to person, place, and time.      Sensory: Sensation is intact.      Motor: Motor function is intact.      Comments: Awake and alert. Follows commands. Answers questions appropriately. Moves all extremities equally. Strength and sensation intact. No focal neuro deficit on exam.   Psychiatric:         Attention and Perception: Attention normal.         Mood and Affect: Mood and affect normal.         Speech: Speech normal.         Behavior: Behavior is cooperative.     ---------------------------------------------------------------------------------------------------------------------  EKG:  Pending cardiology read. Per my interpretation: Sinus with HR 74 BPM, QTc 490 ms (RBBB noted). PAC present. T wave inversion anterolateral leads. Await final cardiology read.    Telemetry:    Sinus 90s     I have personally reviewed the EKG/Telemetry strip  ---------------------------------------------------------------------------------------------------------------------   Results from last 7 days   Lab Units 08/26/23  1045 08/26/23  0841   HSTROP T ng/L 57* 20*           Results from last 7 days   Lab Units 08/26/23  0841   WBC 10*3/mm3 8.98   HEMOGLOBIN g/dL 16.0   HEMATOCRIT % 47.3   MCV fL 89.2   MCHC g/dL 33.8   PLATELETS 10*3/mm3 186     Results from last 7 days   Lab Units 08/26/23  0841   SODIUM mmol/L 137   POTASSIUM mmol/L 3.6   CHLORIDE mmol/L 100   CO2 mmol/L 24.5   BUN mg/dL 12   CREATININE mg/dL 0.94   CALCIUM mg/dL 9.1   GLUCOSE mg/dL 334*   ALBUMIN g/dL 4.1   BILIRUBIN mg/dL 1.2   ALK PHOS U/L 77   AST (SGOT) U/L 14   ALT (SGPT) U/L 8   Estimated Creatinine Clearance: 71 mL/min (by C-G formula based on SCr of 0.94 mg/dL).  Lab Results   Component Value Date    HGBA1C 10.60 (H) 08/27/2022     I have personally reviewed the above laboratory results.    ---------------------------------------------------------------------------------------------------------------------  Imaging Results (Last 7 Days)       Procedure Component Value Units Date/Time    XR Chest 1 View [[id number]] Collected: 08/26/23 0932     Updated: 08/26/23 0935    Narrative:      Examination: AP portable chest     CLINICAL HISTORY: Chest pain     FINDINGS: There has been prior median sternotomy and CABG. The cardiac  silhouette is normal in size. Pulmonary vascularity is normal. Multiple  old healed left-sided rib fractures are noted. No pneumothorax or  pleural effusion. Mild left basilar scarring or atelectasis. The lungs  are otherwise clear.       Impression:      1. No acute cardiopulmonary disease.     This report was finalized on 8/26/2023 9:33 AM by Dane Landers MD.             I have personally reviewed the above radiology results.   ---------------------------------------------------------------------------------------------------------------------    Assessment & Plan      ACUTE HOSPITAL PROBLEMS    -NSTEMI, type I vs type II   -Chest pain, mixed features  -Hypertensive emergency   -Known coronary artery disease s/p CABG (2001) and stenting in the past   Likely type II demand from uncontrolled HTN in setting of not taking home medications   Heparin gtt initiated in ED, continue with protocol in place   Esmolol drip initiated, continue with protocol in place   Cardiology consulted, input/assistance is much appreciated   Obtain TTE   Aspirin statin   AM lipid panel, TSH, A1C  Closely monitor on telemetry   Monitor vitals per hospital protocol, adjust medication regimen as necessary   Repeat labs in AM     -F/E/N  No IV fluids. Replace electrolytes per protocol as necessary.     CHRONIC MEDICAL PROBLEMS    -Type II diabetes mellitus, insulin dependent  Obtain HgbA1C  Review home medication regimen once reconciled per pharmacy   Hold home oral DM medications for now.  Start low dose SSI,  titrate dosage as necessary   Closely monitor blood glucose levels with accuchecks QAC and QHS  Hypoglycemia protocol in place should it be necessary  -Hyperlipidemia: Cont statin   -PAD: Arterial doppler from [facility] on 7//2/2023 reviewed with long segment SFA occlusion with poor flow into the infrapopliteal circulation. Patient was seen at [facility] ED at that time, vascular surgery contacted with recommendation of Xarelto 2.5 mg BID and outpatient follow up.   -History of phimosis s/p recent circumcision 2/2023  -BPH: Supportive care. Bladder scan. Cont home medication regimen once reconciled per pharmacy.   -Former smoker   ---------------------------------------------------  DVT Prophylaxis: Heparin gtt   Activity: Up with assistance as tolerated   ---------------------------------------------------  INPATIENT status due to the need for care which can only be reasonably provided in an hospital setting such as aggressive/expedited ancillary services and/or consultation services, the necessity for IV medications, close physician monitoring and/or the possible need for procedures.  In such, I feel patient’s risk for adverse outcomes and need for care warrant INPATIENT evaluation and predict the patient’s care encounter to likely last beyond 2 midnights.    Code Status: FULL CODE   ---------------------------------------------------  Disposition/Discharge planning: Pending clinical course   ---------------------------------------------------  I have discussed the patient's assessment and plan with the patient, nursing staff, and attending physician Dr. Hugo DO.     Dora Marcos PA-C  Hospitalist Service -- [facility]   Pager: [phone]    08/26/23  12:25 EDT    Attending Physician: Dr. Hugo DO      ---------------------------------------------------------------------------------------------------------------------       Electronically signed by Ryan Arias DO at 08/26/23 1568          Emergency  Department Notes        Davina King RN at 08/26/23 1314          Pt transported to PCU by nurse, and ED tech via stretcher. Zole applied to patient at transfer. Iv's dated/initialed. Bedding clean and dry.     Electronically signed by Davina King RN at 08/26/23 1329       Davina King RN at 08/26/23 1313          Called PCU to report esmolol titration before transport.     Electronically signed by Davina King RN at 08/26/23 1330       Davina King RN at 08/26/23 1252          Report given to PCU NICK johnson    Electronically signed by Davina King RN at 08/26/23 1329       Davina King RN at 08/26/23 1149          Bladder scan 191    Electronically signed by Davina King RN at 08/26/23 1150       Dayton Vizcarra DO at 08/26/23 1116          Subjective   History of Present Illness  72-year-old male with past medical history of coronary artery disease requiring bypass and stenting, diabetes, hypertension, hyperlipidemia presents to the ER due to concerns for crushing chest pain.  Patient noted chest pain radiates across his left arm into the left neck.  Confirmed associated shortness of breath.  No nausea.  Patient does take home metoprolol but he is unaware of his dose.  Patient has not taken his medication lately.  No obvious alleviating factor other than rest.  Slight aggravation with exertion.  Elevated blood pressure noted on arrival.  Afebrile    Review of Systems   Respiratory:  Positive for shortness of breath.    Cardiovascular:  Positive for chest pain.   All other systems reviewed and are negative.    Past Medical History:   Diagnosis Date    Arthritis     Coronary artery disease 10/17/2016    Surgical revascularization, Dr. Bryant, 07/01, [facility], receiving LIMA to LAD, free radial graft to PDA, saphenous graft to first diagonal, saphenous graft to obtuse marginal. MPS, May 2008, per [facility]: Normal perfusion, EF 68%.  April 2012 - exercise Cardiolite WNL, EF  preserved.    Diabetes mellitus 10/17/2016    on Metformin times one year, onset :  2015.  HG A1c 8.6.     Dyslipidemia 10/17/2016    2015:  Total cholesterol 143, triglycerides 326, HDL 39, LDL 38.    Hyperlipidemia     Hypertension 10/17/2016    presumed essential.     Right bundle branch block 10/17/2016    baseline       Allergies   Allergen Reactions    Cardizem [Diltiazem Hcl]     Celecoxib Itching       Past Surgical History:   Procedure Laterality Date    CARDIAC CATHETERIZATION N/A 2022    Procedure: LEFT HEART CATH;  Surgeon: Sriram Bowers MD;  Location:  [facility];  Service: Cardiovascular;  Laterality: N/A;    CIRCUMCISION N/A 2023    Procedure: CIRCUMCISION;  Surgeon: Himanshu Espinoza MD;  Location:  [facility];  Service: Urology;  Laterality: N/A;    COLONOSCOPY      CORONARY ARTERY BYPASS GRAFT         Family History   Problem Relation Age of Onset    Ovarian cancer Mother     Hypertension Father     Stroke Father        Social History     Socioeconomic History    Marital status:    Tobacco Use    Smoking status: Former     Types: Cigarettes     Quit date: 10/28/2001     Years since quittin.8    Smokeless tobacco: Never   Vaping Use    Vaping Use: Never used   Substance and Sexual Activity    Alcohol use: No    Drug use: No    Sexual activity: Defer           Objective   Physical Exam  Constitutional:       General: He is not in acute distress.     Appearance: He is well-developed. He is not ill-appearing.   HENT:      Head: Normocephalic and atraumatic.   Eyes:      Extraocular Movements: Extraocular movements intact.      Pupils: Pupils are equal, round, and reactive to light.   Neck:      Vascular: No JVD.   Cardiovascular:      Rate and Rhythm: Normal rate and regular rhythm.      Heart sounds: Normal heart sounds. No murmur heard.  Pulmonary:      Effort: No tachypnea, accessory muscle usage or respiratory distress.      Breath  sounds: Normal breath sounds. No stridor. No decreased breath sounds, wheezing, rhonchi or rales.   Chest:      Chest wall: No deformity, tenderness or crepitus.      Comments: Vertical midsternal incision scar from previous CABG  Abdominal:      General: Bowel sounds are normal.      Palpations: Abdomen is soft.      Tenderness: There is no abdominal tenderness. There is no guarding or rebound.   Musculoskeletal:         General: Normal range of motion.      Cervical back: Normal range of motion and neck supple.      Right lower leg: No tenderness. No edema.      Left lower leg: No tenderness. No edema.   Lymphadenopathy:      Cervical: No cervical adenopathy.   Skin:     General: Skin is warm and dry.      Coloration: Skin is not cyanotic.      Findings: No ecchymosis or erythema.   Neurological:      General: No focal deficit present.      Mental Status: He is alert and oriented to person, place, and time.      Cranial Nerves: No cranial nerve deficit.      Motor: No weakness.   Psychiatric:         Mood and Affect: Mood normal. Mood is not anxious.         Behavior: Behavior normal. Behavior is not agitated.       Procedures          ED Course  ED Course as of 08/26/23 1140   Sat Aug 26, 2023   0828 EKG notes sinus rhythm.  74 bpm.  ST depression in the anterior leads.  No acute ST elevation.  Some ST depression also in the lateral leads.  Right bundle branch block.  Electronically signed by Dayton Vizcarra DO, 08/26/23, 8:29 AM EDT.   [SF]      ED Course User Index  [SF] Dayton Vizcarra DO      XR Chest 1 View    Result Date: 8/26/2023  1. No acute cardiopulmonary disease.  This report was finalized on 8/26/2023 9:33 AM by Dane Landers MD.       Results for orders placed or performed during the hospital encounter of 08/26/23   Comprehensive Metabolic Panel    Specimen: Blood   Result Value Ref Range    Glucose 334 (H) 65 - 99 mg/dL    BUN 12 8 - 23 mg/dL    Creatinine 0.94 0.76 - 1.27 mg/dL    Sodium 137 136  - 145 mmol/L    Potassium 3.6 3.5 - 5.2 mmol/L    Chloride 100 98 - 107 mmol/L    CO2 24.5 22.0 - 29.0 mmol/L    Calcium 9.1 8.6 - 10.5 mg/dL    Total Protein 7.0 6.0 - 8.5 g/dL    Albumin 4.1 3.5 - 5.2 g/dL    ALT (SGPT) 8 1 - 41 U/L    AST (SGOT) 14 1 - 40 U/L    Alkaline Phosphatase 77 39 - 117 U/L    Total Bilirubin 1.2 0.0 - 1.2 mg/dL    Globulin 2.9 gm/dL    A/G Ratio 1.4 g/dL    BUN/Creatinine Ratio 12.8 7.0 - 25.0    Anion Gap 12.5 5.0 - 15.0 mmol/L    eGFR 85.6 >60.0 mL/min/1.73   High Sensitivity Troponin T    Specimen: Blood   Result Value Ref Range    HS Troponin T 20 (H) <15 ng/L   CBC Auto Differential    Specimen: Blood   Result Value Ref Range    WBC 8.98 3.40 - 10.80 10*3/mm3    RBC 5.30 4.14 - 5.80 10*6/mm3    Hemoglobin 16.0 13.0 - 17.7 g/dL    Hematocrit 47.3 37.5 - 51.0 %    MCV 89.2 79.0 - 97.0 fL    MCH 30.2 26.6 - 33.0 pg    MCHC 33.8 31.5 - 35.7 g/dL    RDW 11.7 (L) 12.3 - 15.4 %    RDW-SD 38.2 37.0 - 54.0 fl    MPV 11.5 6.0 - 12.0 fL    Platelets 186 140 - 450 10*3/mm3    Neutrophil % 68.1 42.7 - 76.0 %    Lymphocyte % 21.8 19.6 - 45.3 %    Monocyte % 5.2 5.0 - 12.0 %    Eosinophil % 3.5 0.3 - 6.2 %    Basophil % 1.2 0.0 - 1.5 %    Immature Grans % 0.2 0.0 - 0.5 %    Neutrophils, Absolute 6.11 1.70 - 7.00 10*3/mm3    Lymphocytes, Absolute 1.96 0.70 - 3.10 10*3/mm3    Monocytes, Absolute 0.47 0.10 - 0.90 10*3/mm3    Eosinophils, Absolute 0.31 0.00 - 0.40 10*3/mm3    Basophils, Absolute 0.11 0.00 - 0.20 10*3/mm3    Immature Grans, Absolute 0.02 0.00 - 0.05 10*3/mm3    nRBC 0.0 0.0 - 0.2 /100 WBC   High Sensitivity Troponin T 2Hr    Specimen: Blood   Result Value Ref Range    HS Troponin T 57 (C) <15 ng/L    Troponin T Delta 37 (C) >=-4 - <+4 ng/L   ECG 12 Lead Chest Pain   Result Value Ref Range    QT Interval 442 ms    QTC Interval 490 ms   Green Top (Gel)   Result Value Ref Range    Extra Tube Hold for add-ons.    Lavender Top   Result Value Ref Range    Extra Tube hold for add-on    Gold  Top - SST   Result Value Ref Range    Extra Tube Hold for add-ons.    Light Blue Top   Result Value Ref Range    Extra Tube Hold for add-ons.                      HEART Score: 8                      Medical Decision Making  CBC and CMP unremarkable.  Troponin upward trend is noted.  EKG notes multiple ST depressions.  Chest x-ray listed.  Heart score of 8.  Elevated blood pressure noted despite multiple doses of labetalol.  Considering Cardene drip for hypertensive urgency versus emergency.  Recommend admission for further work up and treatment.  Hospitalist team consulted and made aware of the patient.  Consults and orders placed per hospitalist request.  Patient was agreeable to admission plan.  Vitals stable on admission.    Problems Addressed:  Chest pain, unspecified type: complicated acute illness or injury  Hypertensive urgency: complicated acute illness or injury    Amount and/or Complexity of Data Reviewed  Labs: ordered. Decision-making details documented in ED Course.  Radiology: ordered. Decision-making details documented in ED Course.  ECG/medicine tests: ordered.    Risk  OTC drugs.  Prescription drug management.        Final diagnoses:   Hypertensive emergency   NSTEMI (non-ST elevated myocardial infarction)       ED Disposition  ED Disposition       ED Disposition   Intended Admit    Condition   --    Comment   --               No follow-up provider specified.       Medication List      No changes were made to your prescriptions during this visit.            Dayton Vizcarra DO  08/26/23 1140      Electronically signed by Dayton Vizcarra DO at 08/26/23 1140

## 2023-08-27 NOTE — PLAN OF CARE
Problem: Adult Inpatient Plan of Care  Goal: Plan of Care Review  Outcome: Ongoing, Progressing   Pt resting comfortably. VSS on RA. Plan for heart cath in AM. Will transfer to tele floor today. Pt and family updated on plan of care. No questions/concerns at this time.

## 2023-08-27 NOTE — PLAN OF CARE
Problem: Adult Inpatient Plan of Care  Goal: Plan of Care Review  Outcome: Ongoing, Progressing  Flowsheets (Taken 8/27/2023 0255)  Outcome Evaluation: Pt resting in bed, call light in reach, and bed alarm set. No new needs noted at this time.  Goal: Patient-Specific Goal (Individualized)  Outcome: Ongoing, Progressing  Goal: Absence of Hospital-Acquired Illness or Injury  Outcome: Ongoing, Progressing  Intervention: Identify and Manage Fall Risk  Recent Flowsheet Documentation  Taken 8/27/2023 0100 by Mamta Hernandez RN  Safety Promotion/Fall Prevention:   activity supervised   assistive device/personal items within reach   clutter free environment maintained   fall prevention program maintained   safety round/check completed  Taken 8/26/2023 2300 by Mamta Hernandez RN  Safety Promotion/Fall Prevention:   activity supervised   assistive device/personal items within reach   clutter free environment maintained   fall prevention program maintained   safety round/check completed  Taken 8/26/2023 2100 by Mamta Hernandez RN  Safety Promotion/Fall Prevention:   activity supervised   assistive device/personal items within reach   clutter free environment maintained   fall prevention program maintained   safety round/check completed  Taken 8/26/2023 2000 by Mamta Hernandez RN  Safety Promotion/Fall Prevention:   activity supervised   assistive device/personal items within reach   clutter free environment maintained   fall prevention program maintained   safety round/check completed  Taken 8/26/2023 1900 by Mamta Hernandez RN  Safety Promotion/Fall Prevention:   activity supervised   assistive device/personal items within reach   clutter free environment maintained   fall prevention program maintained   safety round/check completed  Intervention: Prevent Skin Injury  Recent Flowsheet Documentation  Taken 8/27/2023 0200 by Mamta Hernandez RN  Skin Protection:   adhesive use limited   incontinence pads utilized   transparent  dressing maintained   tubing/devices free from skin contact  Taken 8/26/2023 2000 by Mamta Hernandez RN  Skin Protection:   adhesive use limited   incontinence pads utilized   tubing/devices free from skin contact   transparent dressing maintained  Intervention: Prevent and Manage VTE (Venous Thromboembolism) Risk  Recent Flowsheet Documentation  Taken 8/27/2023 0200 by Mamta Hernandez RN  VTE Prevention/Management: (see MAR) other (see comments)  Range of Motion: active ROM (range of motion) encouraged  Taken 8/27/2023 0100 by Mamta Hernandez RN  Activity Management: activity encouraged  Taken 8/26/2023 2300 by Mamta Hernandez RN  Activity Management:   activity encouraged   bedrest  Taken 8/26/2023 2100 by Mamta Hernandez RN  Activity Management: activity encouraged  Taken 8/26/2023 2000 by Mamta Hernandez RN  Activity Management: (pt remain in bed right now due to low BP) activity encouraged  VTE Prevention/Management: (see MAR) other (see comments)  Range of Motion: active ROM (range of motion) encouraged  Taken 8/26/2023 1900 by Mamta Hernandez RN  Activity Management: activity encouraged  Intervention: Prevent Infection  Recent Flowsheet Documentation  Taken 8/27/2023 0100 by Mamta Hernandez RN  Infection Prevention:   single patient room provided   rest/sleep promoted   hand hygiene promoted  Taken 8/26/2023 2300 by Mamta Hernandez RN  Infection Prevention:   single patient room provided   rest/sleep promoted   hand hygiene promoted  Taken 8/26/2023 2100 by Mamta Hernandez RN  Infection Prevention:   single patient room provided   rest/sleep promoted   hand hygiene promoted  Taken 8/26/2023 2000 by Mamta Hernandez RN  Infection Prevention:   single patient room provided   rest/sleep promoted   hand hygiene promoted  Taken 8/26/2023 1900 by Mamta Hernandez RN  Infection Prevention:   single patient room provided   rest/sleep promoted   hand hygiene promoted  Goal: Optimal Comfort and Wellbeing  Outcome:  Ongoing, Progressing  Intervention: Provide Person-Centered Care  Recent Flowsheet Documentation  Taken 8/27/2023 0200 by Mamta Hernandez RN  Trust Relationship/Rapport:   care explained   choices provided   questions answered   questions encouraged   reassurance provided   thoughts/feelings acknowledged  Taken 8/26/2023 2000 by Mamta Hernandez RN  Trust Relationship/Rapport:   care explained   choices provided   questions answered   questions encouraged   reassurance provided  Goal: Readiness for Transition of Care  Outcome: Ongoing, Progressing     Problem: Fall Injury Risk  Goal: Absence of Fall and Fall-Related Injury  Outcome: Ongoing, Progressing  Intervention: Identify and Manage Contributors  Recent Flowsheet Documentation  Taken 8/27/2023 0200 by Mamta Hernandez RN  Medication Review/Management: medications reviewed  Taken 8/27/2023 0100 by Mamta Hernandez RN  Medication Review/Management: medications reviewed  Taken 8/26/2023 2300 by Mamta Hernandez RN  Medication Review/Management: medications reviewed  Taken 8/26/2023 2100 by Mamta Hernandez RN  Medication Review/Management: medications reviewed  Taken 8/26/2023 2000 by Mamta Hernandez RN  Medication Review/Management: medications reviewed  Taken 8/26/2023 1900 by Mamta Hernandez RN  Medication Review/Management: medications reviewed  Intervention: Promote Injury-Free Environment  Recent Flowsheet Documentation  Taken 8/27/2023 0100 by Mamta Hernandez RN  Safety Promotion/Fall Prevention:   activity supervised   assistive device/personal items within reach   clutter free environment maintained   fall prevention program maintained   safety round/check completed  Taken 8/26/2023 2300 by Mamta Hernadnez RN  Safety Promotion/Fall Prevention:   activity supervised   assistive device/personal items within reach   clutter free environment maintained   fall prevention program maintained   safety round/check completed  Taken 8/26/2023 2100 by Mamta Hernandez  RN  Safety Promotion/Fall Prevention:   activity supervised   assistive device/personal items within reach   clutter free environment maintained   fall prevention program maintained   safety round/check completed  Taken 8/26/2023 2000 by Mamta Hernandez RN  Safety Promotion/Fall Prevention:   activity supervised   assistive device/personal items within reach   clutter free environment maintained   fall prevention program maintained   safety round/check completed  Taken 8/26/2023 1900 by Mamta Hernandez RN  Safety Promotion/Fall Prevention:   activity supervised   assistive device/personal items within reach   clutter free environment maintained   fall prevention program maintained   safety round/check completed     Problem: COPD (Chronic Obstructive Pulmonary Disease) Comorbidity  Goal: Maintenance of COPD Symptom Control  Outcome: Ongoing, Progressing  Intervention: Maintain COPD-Symptom Control  Recent Flowsheet Documentation  Taken 8/27/2023 0200 by Mamta Hernandez RN  Supportive Measures:   active listening utilized   relaxation techniques promoted  Medication Review/Management: medications reviewed  Taken 8/27/2023 0100 by Mamta Hernandez RN  Medication Review/Management: medications reviewed  Taken 8/26/2023 2300 by Mamta Hernandez RN  Medication Review/Management: medications reviewed  Taken 8/26/2023 2100 by Mamta Hernandez RN  Medication Review/Management: medications reviewed  Taken 8/26/2023 2000 by Mamta Hernandez RN  Supportive Measures:   active listening utilized   relaxation techniques promoted  Medication Review/Management: medications reviewed  Taken 8/26/2023 1900 by Mamta Hernandez RN  Medication Review/Management: medications reviewed     Problem: Heart Failure Comorbidity  Goal: Maintenance of Heart Failure Symptom Control  Outcome: Ongoing, Progressing  Intervention: Maintain Heart Failure-Management  Recent Flowsheet Documentation  Taken 8/27/2023 0200 by Mamta Hernandez RN  Medication  Review/Management: medications reviewed  Taken 8/27/2023 0100 by Mamta Hernandez RN  Medication Review/Management: medications reviewed  Taken 8/26/2023 2300 by Mamta Hernandez RN  Medication Review/Management: medications reviewed  Taken 8/26/2023 2100 by Mamta Hernandez RN  Medication Review/Management: medications reviewed  Taken 8/26/2023 2000 by Mamta Hernandez RN  Medication Review/Management: medications reviewed  Taken 8/26/2023 1900 by Mamta Hernandez RN  Medication Review/Management: medications reviewed     Problem: Hypertension Comorbidity  Goal: Blood Pressure in Desired Range  Outcome: Ongoing, Progressing  Intervention: Maintain Blood Pressure Management  Recent Flowsheet Documentation  Taken 8/27/2023 0200 by Mamta Hernandez RN  Medication Review/Management: medications reviewed  Taken 8/27/2023 0100 by Mamta Hernandez RN  Medication Review/Management: medications reviewed  Taken 8/26/2023 2300 by Mamta Hernandez RN  Medication Review/Management: medications reviewed  Taken 8/26/2023 2100 by Mamta Hernandez RN  Medication Review/Management: medications reviewed  Taken 8/26/2023 2000 by Mamta Hernandez RN  Medication Review/Management: medications reviewed  Taken 8/26/2023 1900 by Mamta Hernandez RN  Medication Review/Management: medications reviewed     Problem: Skin Injury Risk Increased  Goal: Skin Health and Integrity  Outcome: Ongoing, Progressing  Intervention: Optimize Skin Protection  Recent Flowsheet Documentation  Taken 8/27/2023 0200 by Mamta Hernandez RN  Pressure Reduction Techniques:   frequent weight shift encouraged   pressure points protected  Pressure Reduction Devices:   specialty bed utilized   pressure-redistributing mattress utilized   positioning supports utilized  Skin Protection:   adhesive use limited   incontinence pads utilized   transparent dressing maintained   tubing/devices free from skin contact  Taken 8/26/2023 2000 by Mamta Hernandez RN  Skin Protection:   adhesive  use limited   incontinence pads utilized   tubing/devices free from skin contact   transparent dressing maintained   Goal Outcome Evaluation:              Outcome Evaluation: Pt resting in bed, call light in reach, and bed alarm set. No new needs noted at this time.

## 2023-08-28 ENCOUNTER — APPOINTMENT (OUTPATIENT)
Dept: ULTRASOUND IMAGING | Facility: HOSPITAL | Age: 73
DRG: 281 | End: 2023-08-28
Payer: MEDICARE

## 2023-08-28 LAB
ANION GAP SERPL CALCULATED.3IONS-SCNC: 11.2 MMOL/L (ref 5–15)
BUN SERPL-MCNC: 17 MG/DL (ref 8–23)
BUN/CREAT SERPL: 20 (ref 7–25)
CALCIUM SPEC-SCNC: 8.8 MG/DL (ref 8.6–10.5)
CHLORIDE SERPL-SCNC: 102 MMOL/L (ref 98–107)
CO2 SERPL-SCNC: 23.8 MMOL/L (ref 22–29)
CREAT SERPL-MCNC: 0.85 MG/DL (ref 0.76–1.27)
EGFRCR SERPLBLD CKD-EPI 2021: 91.8 ML/MIN/1.73
GLUCOSE BLDC GLUCOMTR-MCNC: 191 MG/DL (ref 70–130)
GLUCOSE BLDC GLUCOMTR-MCNC: 191 MG/DL (ref 70–130)
GLUCOSE BLDC GLUCOMTR-MCNC: 261 MG/DL (ref 70–130)
GLUCOSE BLDC GLUCOMTR-MCNC: 270 MG/DL (ref 70–130)
GLUCOSE SERPL-MCNC: 162 MG/DL (ref 65–99)
POTASSIUM SERPL-SCNC: 3.8 MMOL/L (ref 3.5–5.2)
SODIUM SERPL-SCNC: 137 MMOL/L (ref 136–145)
UFH PPP CHRO-ACNC: 0.16 IU/ML (ref 0.3–0.7)
UFH PPP CHRO-ACNC: 0.29 IU/ML (ref 0.3–0.7)

## 2023-08-28 PROCEDURE — 93455 CORONARY ART/GRFT ANGIO S&I: CPT | Performed by: INTERNAL MEDICINE

## 2023-08-28 PROCEDURE — 85520 HEPARIN ASSAY: CPT | Performed by: STUDENT IN AN ORGANIZED HEALTH CARE EDUCATION/TRAINING PROGRAM

## 2023-08-28 PROCEDURE — 25010000002 FENTANYL CITRATE (PF) 50 MCG/ML SOLUTION: Performed by: INTERNAL MEDICINE

## 2023-08-28 PROCEDURE — 25010000002 HEPARIN (PORCINE) 25000-0.45 UT/250ML-% SOLUTION

## 2023-08-28 PROCEDURE — C1894 INTRO/SHEATH, NON-LASER: HCPCS | Performed by: INTERNAL MEDICINE

## 2023-08-28 PROCEDURE — 93926 LOWER EXTREMITY STUDY: CPT

## 2023-08-28 PROCEDURE — 82948 REAGENT STRIP/BLOOD GLUCOSE: CPT

## 2023-08-28 PROCEDURE — C1769 GUIDE WIRE: HCPCS | Performed by: INTERNAL MEDICINE

## 2023-08-28 PROCEDURE — 63710000001 INSULIN LISPRO (HUMAN) PER 5 UNITS: Performed by: INTERNAL MEDICINE

## 2023-08-28 PROCEDURE — 25010000002 MIDAZOLAM PER 1 MG: Performed by: INTERNAL MEDICINE

## 2023-08-28 PROCEDURE — 99153 MOD SED SAME PHYS/QHP EA: CPT | Performed by: INTERNAL MEDICINE

## 2023-08-28 PROCEDURE — 25510000001 IOPAMIDOL PER 1 ML: Performed by: INTERNAL MEDICINE

## 2023-08-28 PROCEDURE — 99152 MOD SED SAME PHYS/QHP 5/>YRS: CPT | Performed by: INTERNAL MEDICINE

## 2023-08-28 PROCEDURE — 99233 SBSQ HOSP IP/OBS HIGH 50: CPT | Performed by: STUDENT IN AN ORGANIZED HEALTH CARE EDUCATION/TRAINING PROGRAM

## 2023-08-28 PROCEDURE — B2111ZZ FLUOROSCOPY OF MULTIPLE CORONARY ARTERIES USING LOW OSMOLAR CONTRAST: ICD-10-PCS | Performed by: INTERNAL MEDICINE

## 2023-08-28 PROCEDURE — C1760 CLOSURE DEV, VASC: HCPCS | Performed by: INTERNAL MEDICINE

## 2023-08-28 PROCEDURE — B2131ZZ FLUOROSCOPY OF MULTIPLE CORONARY ARTERY BYPASS GRAFTS USING LOW OSMOLAR CONTRAST: ICD-10-PCS | Performed by: INTERNAL MEDICINE

## 2023-08-28 PROCEDURE — 25010000002 HEPARIN (PORCINE) PER 1000 UNITS: Performed by: STUDENT IN AN ORGANIZED HEALTH CARE EDUCATION/TRAINING PROGRAM

## 2023-08-28 PROCEDURE — 80048 BASIC METABOLIC PNL TOTAL CA: CPT | Performed by: STUDENT IN AN ORGANIZED HEALTH CARE EDUCATION/TRAINING PROGRAM

## 2023-08-28 RX ORDER — SODIUM CHLORIDE 9 MG/ML
1 INJECTION, SOLUTION INTRAVENOUS CONTINUOUS
Status: ACTIVE | OUTPATIENT
Start: 2023-08-28 | End: 2023-08-28

## 2023-08-28 RX ORDER — BILBERRY FRUIT 1000 MG
1 CAPSULE ORAL DAILY
COMMUNITY
End: 2023-08-29 | Stop reason: HOSPADM

## 2023-08-28 RX ORDER — FENTANYL CITRATE 50 UG/ML
INJECTION, SOLUTION INTRAMUSCULAR; INTRAVENOUS
Status: DISCONTINUED | OUTPATIENT
Start: 2023-08-28 | End: 2023-08-28 | Stop reason: HOSPADM

## 2023-08-28 RX ORDER — ZINC SULFATE 50(220)MG
220 CAPSULE ORAL DAILY
Status: DISCONTINUED | OUTPATIENT
Start: 2023-08-28 | End: 2023-08-29 | Stop reason: HOSPADM

## 2023-08-28 RX ORDER — MIDAZOLAM HYDROCHLORIDE 1 MG/ML
INJECTION INTRAMUSCULAR; INTRAVENOUS
Status: DISCONTINUED | OUTPATIENT
Start: 2023-08-28 | End: 2023-08-28 | Stop reason: HOSPADM

## 2023-08-28 RX ORDER — LIDOCAINE HYDROCHLORIDE 20 MG/ML
INJECTION, SOLUTION INFILTRATION; PERINEURAL
Status: DISCONTINUED | OUTPATIENT
Start: 2023-08-28 | End: 2023-08-28 | Stop reason: HOSPADM

## 2023-08-28 RX ORDER — NITROGLYCERIN 0.4 MG/1
0.4 TABLET SUBLINGUAL
Status: DISCONTINUED | OUTPATIENT
Start: 2023-08-28 | End: 2023-08-29 | Stop reason: HOSPADM

## 2023-08-28 RX ORDER — ACETAMINOPHEN 325 MG/1
650 TABLET ORAL EVERY 4 HOURS PRN
Status: DISCONTINUED | OUTPATIENT
Start: 2023-08-28 | End: 2023-08-29 | Stop reason: HOSPADM

## 2023-08-28 RX ORDER — HEPARIN SODIUM 5000 [USP'U]/ML
25 INJECTION, SOLUTION INTRAVENOUS; SUBCUTANEOUS ONCE
Status: COMPLETED | OUTPATIENT
Start: 2023-08-28 | End: 2023-08-28

## 2023-08-28 RX ORDER — ZINC SULFATE 50(220)MG
220 CAPSULE ORAL DAILY
COMMUNITY

## 2023-08-28 RX ORDER — SODIUM CHLORIDE 9 MG/ML
INJECTION, SOLUTION INTRAVENOUS
Status: COMPLETED | OUTPATIENT
Start: 2023-08-28 | End: 2023-08-28

## 2023-08-28 RX ORDER — CLOPIDOGREL BISULFATE 75 MG/1
75 TABLET ORAL DAILY
Status: DISCONTINUED | OUTPATIENT
Start: 2023-08-28 | End: 2023-08-29 | Stop reason: HOSPADM

## 2023-08-28 RX ADMIN — INSULIN LISPRO 2 UNITS: 100 INJECTION, SOLUTION INTRAVENOUS; SUBCUTANEOUS at 18:16

## 2023-08-28 RX ADMIN — DOCUSATE SODIUM 50 MG AND SENNOSIDES 8.6 MG 2 TABLET: 8.6; 5 TABLET, FILM COATED ORAL at 20:56

## 2023-08-28 RX ADMIN — Medication 10 ML: at 15:23

## 2023-08-28 RX ADMIN — ASPIRIN 81 MG: 81 TABLET, CHEWABLE ORAL at 15:21

## 2023-08-28 RX ADMIN — Medication 220 MG: at 20:55

## 2023-08-28 RX ADMIN — Medication 10 ML: at 20:57

## 2023-08-28 RX ADMIN — INSULIN LISPRO 4 UNITS: 100 INJECTION, SOLUTION INTRAVENOUS; SUBCUTANEOUS at 15:21

## 2023-08-28 RX ADMIN — DONEPEZIL HYDROCHLORIDE 5 MG: 5 TABLET, FILM COATED ORAL at 20:55

## 2023-08-28 RX ADMIN — HEPARIN SODIUM 1800 UNITS: 5000 INJECTION INTRAVENOUS; SUBCUTANEOUS at 04:16

## 2023-08-28 RX ADMIN — ROSUVASTATIN CALCIUM 40 MG: 20 TABLET, FILM COATED ORAL at 20:55

## 2023-08-28 RX ADMIN — METOPROLOL TARTRATE 12.5 MG: 25 TABLET, FILM COATED ORAL at 20:56

## 2023-08-28 RX ADMIN — SODIUM CHLORIDE 1 ML/KG/HR: 9 INJECTION, SOLUTION INTRAVENOUS at 15:20

## 2023-08-28 RX ADMIN — HEPARIN SODIUM 20 UNITS/KG/HR: 10000 INJECTION, SOLUTION INTRAVENOUS at 14:41

## 2023-08-28 RX ADMIN — METOPROLOL TARTRATE 12.5 MG: 25 TABLET, FILM COATED ORAL at 15:21

## 2023-08-28 RX ADMIN — INSULIN LISPRO 4 UNITS: 100 INJECTION, SOLUTION INTRAVENOUS; SUBCUTANEOUS at 20:55

## 2023-08-28 RX ADMIN — CLOPIDOGREL BISULFATE 75 MG: 75 TABLET, FILM COATED ORAL at 15:21

## 2023-08-28 NOTE — PLAN OF CARE
Goal Outcome Evaluation:  Pt resting in bed with no s/s of distress noted. VSS. IV access maintained throughout shift. Pt on RA and tolerating well. No requests at this time. Will continue with plan of care.

## 2023-08-28 NOTE — CASE MANAGEMENT/SOCIAL WORK
Discharge Planning Assessment  [facility]     Patient Name: Giorgio Omer  MRN: [id number]  Today's Date: 8/28/2023    Admit Date: 8/26/2023    Plan: SS received consult per nsg for discharge planning.  SS spoke with pt at bedside with Brothers and Son present with permission of pt.  Pt resides at home alone at [address] [place] [address] and plans to return home at discharge.  Pt currently does not utilize home health services. Pt currently utilizes walker and cane.  Pt's PCP is Lurdes Estrada.  [facility]  Pt stated no POA or Living Will.  SS will follow and assist with discharge needs.       Discharge Plan       Row Name 08/28/23 1534       Plan    Plan SS received consult per ns for discharge planning.  SS spoke with pt at bedside with Brothers and Son present with permission of pt.  Pt resides at home alone at [address] [place] [address] and plans to return home at discharge.  Pt currently does not utilize home health services. Pt currently utilizes walker and cane.  Pt's PCP is Lurdes Estrada.  [facility]  Pt stated no POA or Living Will.  SS will follow and assist with discharge needs.                  Continued Care and Services - Admitted Since 8/26/2023    Coordination has not been started for this encounter.       Expected Discharge Date and Time       Expected Discharge Date Expected Discharge Time    Aug 29, 2023           HERMANN Membreno

## 2023-08-28 NOTE — PLAN OF CARE
Goal Outcome Evaluation:   Pt resting in bed this shift, family present at bedside. Educated pt on NPO status, verbalizes understanding. Consent obtained for cardiac cath, placed in chart. No complaints voiced at this time. No visible acute s/s of distress noted.

## 2023-08-28 NOTE — PROGRESS NOTES
[facility] HOSPITALIST PROGRESS NOTE     Patient Identification:  Name:  Giorgio Omer  Age:  72 y.o.  Sex:  male  :  1950  MRN:  [id number]  Visit Number:  [id number]  ROOM: [address] [place] [address]     Primary Care Provider:  Lurdes Estrada MD    Length of stay in inpatient status:  2    Subjective     Chief Compliant:    Chief Complaint   Patient presents with    Chest Pain       History of Presenting Illness: Patient seen and evaluated in follow-up for NSTEMI, type I versus type II, with chest pain and hypertensive emergency with known coronary artery disease status post CABG who recently stopped taking his medications after running out of them 2 weeks prior.  On further discovery today after family brought in medications patient has not filled a medication since roughly September of last year so he has been very noncompliant over the last year or so.  Patient with subsequent left heart cath today with multivessel disease.    Objective     Current Hospital Meds:  aspirin, 81 mg, Oral, Daily  clopidogrel, 75 mg, Oral, Daily  donepezil, 5 mg, Oral, Nightly  insulin lispro, 2-7 Units, Subcutaneous, 4x Daily AC & at Bedtime  metoprolol tartrate, 12.5 mg, Oral, Q12H  rosuvastatin, 40 mg, Oral, Nightly  senna-docusate sodium, 2 tablet, Oral, BID  sodium chloride, 10 mL, Intravenous, Q12H  sodium chloride, 10 mL, Intravenous, Q12H  zinc sulfate, 220 mg, Oral, Daily           ----------------------------------------------------------------------------------------------------------------------  Vital Signs:  Temp:  [98 °F (36.7 °C)-98.6 °F (37 °C)] 98 °F (36.7 °C)  Heart Rate:  [56-85] 85  Resp:  [18] 18  BP: (103-173)/(55-88) 137/84  SpO2:  [98 %-100 %] 98 %  on  Flow (L/min):  [2] 2;   Device (Oxygen Therapy): nasal cannula  Body mass index is 21.2 kg/m².      Intake/Output Summary (Last 24 hours) at 2023 193  Last data filed at 2023 1424  Gross per 24 hour   Intake 48.75 ml   Output 1000 ml    Net -951.25 ml      ----------------------------------------------------------------------------------------------------------------------  Physical exam:  Constitutional:  Well-developed and well-nourished.  No acute distress.    HENT:  Head:  Normocephalic and atraumatic.  Mouth:  Moist mucous membranes.    Eyes:  Conjunctivae and EOM are normal. No scleral icterus.    Neck:  Neck supple.  No JVD present.    Cardiovascular:  Normal rate, regular rhythm and normal heart sounds with no murmur.  Pulmonary/Chest:  No respiratory distress, no wheezes, no crackles, with normal breath sounds and good air movement.  Abdominal:  Soft.  Bowel sounds are normal.  No distension and no tenderness.   Musculoskeletal:  No edema, no tenderness, and no deformity.  No red or swollen joints anywhere.  Functional ROM intact.   Neurological:  Alert and oriented to person, place, and time.  No cranial nerve deficit.  No tongue deviation.  No facial droop.  No slurred speech. Intact Sensation throughout  Skin:  Skin is warm and dry. No rash or lesion noted. No pallor.   Peripheral vascular:  Pulses in all 4 extremities with no clubbing, no cyanosis, no edema.  Psychiatric: Appropriate mood and affect, pleasant.   ----------------------------------------------------------------------------------------------------------------------     BUN/CREAT/GLUC/ALT/AST/WAYNE/LIP    17/0.85/162/--/--/--/-- (08/28 0236)  TREV - Na/K/Cl/CO2: 137/3.8/102/23.8 (08/28 0236)        No results found for: URINECX  No results found for: BLOODCX    I have personally looked at the labs and they are summarized above.  ----------------------------------------------------------------------------------------------------------------------  Detailed radiology reports for the last 24 hours:  No radiology results for the last day    Assessment & Plan      NSTEMI, type I versus type II  Chest pain, resolved  Hypertensive emergency  CAD s/p CABG (2001) s/p stenting     -[facility] admitting to [facility] and then forgetting to  or obtain refills of medications and has been off all medications for the last 2 weeks.    -Initially on esmolol drip due to history of allergy to calcium channel blockers but able to be titrated off.  Patient well-controlled on metoprolol monotherapy.    -Patient with no complaints of chest pain today however a.m. troponin elevated at 1483 increased from 57 the day prior.    -Cardiology consulted and following along and planning for heart cath tomorrow.    -TTE obtained on 8/26/2023 shows an EF of 51 to 55% with moderate mitral valve regurg with normal RVSP and no evidence of pericardial effusion with normal systolic function..    -Left heart cath today with significant severe multivessel disease initially cardiology hopeful for redo of bypass surgery however cardiothoracic surgery did not feel that he would be aware the candidate and instead might be better served by high risk staged PCI.  Cardiology currently awaiting callback from interventional cardiology at [facility] regarding transfer for this.    Diabetes mellitus type 2    -Hemoglobin A1c returning at 10.2    -Continue basal and bolus insulin with sliding scale insulin and will add Lantus today.      Hyperlipidemia    -Continue statin    Peripheral arterial disease    -Previous arterial Doppler from [facility] on 7/2/2023 showed long segment SFA occlusion with poor flow in the infrapopliteal circulation.  Patient was seen in the ED at that time and consultation obtained with vascular surgery they recommended Xarelto 2.5 twice daily and outpatient follow-up with vascular surgery either in [place] or [place].  Patient reports being on anticoagulation for a couple months before stopping which would not be typical standard of care for occlusive arterial disease.  Upon further questioning today as well as obtaining  [facility] [place] patient admits that he did not seek any follow-up and therefore this SFA occlusion has gone untreated and followed up.    -We will obtain arterial duplex of the right lower extremity to evaluate for any worsening involvement and also confirm previous noted occlusion.    -If patient transferred to [facility] would benefit from evaluation of their if findings still present versus discussing with vascular surgery via phone here.    History of phimosis s/p circumcision  Benign prostatic hypertrophy  Former smoker        Copied text in portions of the note has been reviewed and is accurate as of 08/28/23    VTE Prophylaxis:   Mechanical Order History:       None          Pharmalogical Order History:        Ordered     Dose Route Frequency Stop    08/27/23 1315  heparin (porcine) 5000 UNIT/ML injection 1,800 Units         25 Units/kg (Order-Specific) IV Once 08/27/23 1323    08/26/23 1138  heparin (porcine) 5000 UNIT/ML injection 4,000 Units         4,000 Units IV Once 08/26/23 1302    08/26/23 1138  heparin 72089 units/250 mL (100 units/mL) in 0.45 % NaCl infusion  11.47 mL/hr         16 Units/kg/hr IV Titrated --    08/26/23 1138  heparin (porcine) 5000 UNIT/ML injection 3,600 Units  Status:  Discontinued         50 Units/kg IV As Needed 08/26/23 1145    08/26/23 1138  heparin (porcine) 5000 UNIT/ML injection 1,800 Units  Status:  Discontinued         25 Units/kg IV As Needed 08/26/23 1145    08/26/23 1138  Pharmacy to Dose Heparin         -- XX Continuous PRN --                    Disposition pending possible transfer to higher level care for high risk staged PCI.    Ryan Arias DO  [facility] Hospitalist  08/28/23  19:31 EDT

## 2023-08-29 ENCOUNTER — HOSPITAL ENCOUNTER (INPATIENT)
Facility: HOSPITAL | Age: 73
LOS: 2 days | Discharge: HOME | DRG: 247 | End: 2023-08-31
Attending: INTERNAL MEDICINE | Admitting: INTERNAL MEDICINE
Payer: MEDICARE

## 2023-08-29 VITALS
HEART RATE: 70 BPM | RESPIRATION RATE: 18 BRPM | OXYGEN SATURATION: 98 % | HEIGHT: 73 IN | SYSTOLIC BLOOD PRESSURE: 122 MMHG | WEIGHT: 161 LBS | TEMPERATURE: 98.5 F | BODY MASS INDEX: 21.34 KG/M2 | DIASTOLIC BLOOD PRESSURE: 67 MMHG

## 2023-08-29 DIAGNOSIS — I73.9 PERIPHERAL ARTERY DISEASE: ICD-10-CM

## 2023-08-29 DIAGNOSIS — I21.9 TYPE 1 MYOCARDIAL INFARCTION: ICD-10-CM

## 2023-08-29 DIAGNOSIS — I73.9 PAD (PERIPHERAL ARTERY DISEASE): ICD-10-CM

## 2023-08-29 DIAGNOSIS — I24.9 ACS (ACUTE CORONARY SYNDROME): ICD-10-CM

## 2023-08-29 DIAGNOSIS — I21.4 NSTEMI (NON-ST ELEVATED MYOCARDIAL INFARCTION): Primary | ICD-10-CM

## 2023-08-29 LAB
ANION GAP SERPL CALCULATED.3IONS-SCNC: 12.4 MMOL/L (ref 5–15)
APTT PPP: 30.4 SECONDS (ref 26.5–34.5)
BUN SERPL-MCNC: 19 MG/DL (ref 8–23)
BUN/CREAT SERPL: 26.4 (ref 7–25)
CALCIUM SPEC-SCNC: 8.8 MG/DL (ref 8.6–10.5)
CHLORIDE SERPL-SCNC: 100 MMOL/L (ref 98–107)
CO2 SERPL-SCNC: 21.6 MMOL/L (ref 22–29)
CREAT SERPL-MCNC: 0.72 MG/DL (ref 0.76–1.27)
DEPRECATED RDW RBC AUTO: 38.9 FL (ref 37–54)
EGFRCR SERPLBLD CKD-EPI 2021: 96.5 ML/MIN/1.73
ERYTHROCYTE [DISTWIDTH] IN BLOOD BY AUTOMATED COUNT: 11.9 % (ref 12.3–15.4)
GEN 5 2HR TROPONIN T REFLEX: 1945 NG/L
GLUCOSE BLDC GLUCOMTR-MCNC: 178 MG/DL (ref 70–130)
GLUCOSE BLDC GLUCOMTR-MCNC: 180 MG/DL (ref 70–130)
GLUCOSE BLDC GLUCOMTR-MCNC: 218 MG/DL (ref 70–130)
GLUCOSE BLDC GLUCOMTR-MCNC: 293 MG/DL (ref 70–130)
GLUCOSE SERPL-MCNC: 233 MG/DL (ref 65–99)
HCT VFR BLD AUTO: 45.3 % (ref 37.5–51)
HGB BLD-MCNC: 15 G/DL (ref 13–17.7)
INR PPP: 0.97 (ref 0.9–1.1)
MCH RBC QN AUTO: 29.9 PG (ref 26.6–33)
MCHC RBC AUTO-ENTMCNC: 33.1 G/DL (ref 31.5–35.7)
MCV RBC AUTO: 90.2 FL (ref 79–97)
PLATELET # BLD AUTO: 159 10*3/MM3 (ref 140–450)
PMV BLD AUTO: 12.1 FL (ref 6–12)
POTASSIUM SERPL-SCNC: 4.1 MMOL/L (ref 3.5–5.2)
PROTHROMBIN TIME: 13.4 SECONDS (ref 12.1–14.7)
QT INTERVAL: 476 MS
QT INTERVAL: 480 MS
QTC INTERVAL: 467 MS
QTC INTERVAL: 487 MS
RBC # BLD AUTO: 5.02 10*6/MM3 (ref 4.14–5.8)
SODIUM SERPL-SCNC: 134 MMOL/L (ref 136–145)
TROPONIN T DELTA: 200 NG/L
TROPONIN T SERPL HS-MCNC: 1745 NG/L
UFH PPP CHRO-ACNC: 0.1 IU/ML (ref 0.3–0.7)
UFH PPP CHRO-ACNC: <0.1 IU/ML (ref 0.3–0.7)
WBC NRBC COR # BLD: 8.55 10*3/MM3 (ref 3.4–10.8)

## 2023-08-29 PROCEDURE — C1887 CATHETER, GUIDING: HCPCS | Performed by: INTERNAL MEDICINE

## 2023-08-29 PROCEDURE — C1725 CATH, TRANSLUMIN NON-LASER: HCPCS | Performed by: INTERNAL MEDICINE

## 2023-08-29 PROCEDURE — 82948 REAGENT STRIP/BLOOD GLUCOSE: CPT

## 2023-08-29 PROCEDURE — 99239 HOSP IP/OBS DSCHRG MGMT >30: CPT | Performed by: INTERNAL MEDICINE

## 2023-08-29 PROCEDURE — 80048 BASIC METABOLIC PNL TOTAL CA: CPT | Performed by: INTERNAL MEDICINE

## 2023-08-29 PROCEDURE — 85520 HEPARIN ASSAY: CPT | Performed by: INTERNAL MEDICINE

## 2023-08-29 PROCEDURE — 25010000002 HEPARIN (PORCINE) 25000-0.45 UT/250ML-% SOLUTION: Performed by: INTERNAL MEDICINE

## 2023-08-29 PROCEDURE — 63710000001 INSULIN LISPRO (HUMAN) PER 5 UNITS: Performed by: INTERNAL MEDICINE

## 2023-08-29 PROCEDURE — 25510000001 IOPAMIDOL PER 1 ML: Performed by: INTERNAL MEDICINE

## 2023-08-29 PROCEDURE — 93005 ELECTROCARDIOGRAM TRACING: CPT

## 2023-08-29 PROCEDURE — 4A023N7 MEASUREMENT OF CARDIAC SAMPLING AND PRESSURE, LEFT HEART, PERCUTANEOUS APPROACH: ICD-10-PCS | Performed by: INTERNAL MEDICINE

## 2023-08-29 PROCEDURE — 85610 PROTHROMBIN TIME: CPT | Performed by: INTERNAL MEDICINE

## 2023-08-29 PROCEDURE — 99222 1ST HOSP IP/OBS MODERATE 55: CPT | Performed by: INTERNAL MEDICINE

## 2023-08-29 PROCEDURE — 25010000002 BIVALIRUDIN TRIFLUOROACETATE 250 MG RECONSTITUTED SOLUTION 1 EACH VIAL: Performed by: INTERNAL MEDICINE

## 2023-08-29 PROCEDURE — 84484 ASSAY OF TROPONIN QUANT: CPT | Performed by: INTERNAL MEDICINE

## 2023-08-29 PROCEDURE — B2131ZZ FLUOROSCOPY OF MULTIPLE CORONARY ARTERY BYPASS GRAFTS USING LOW OSMOLAR CONTRAST: ICD-10-PCS | Performed by: INTERNAL MEDICINE

## 2023-08-29 PROCEDURE — 85027 COMPLETE CBC AUTOMATED: CPT | Performed by: INTERNAL MEDICINE

## 2023-08-29 PROCEDURE — B2111ZZ FLUOROSCOPY OF MULTIPLE CORONARY ARTERIES USING LOW OSMOLAR CONTRAST: ICD-10-PCS | Performed by: INTERNAL MEDICINE

## 2023-08-29 PROCEDURE — 85520 HEPARIN ASSAY: CPT | Performed by: NURSE PRACTITIONER

## 2023-08-29 PROCEDURE — C1769 GUIDE WIRE: HCPCS | Performed by: INTERNAL MEDICINE

## 2023-08-29 PROCEDURE — 93005 ELECTROCARDIOGRAM TRACING: CPT | Performed by: INTERNAL MEDICINE

## 2023-08-29 PROCEDURE — 027035Z DILATION OF CORONARY ARTERY, ONE ARTERY WITH TWO DRUG-ELUTING INTRALUMINAL DEVICES, PERCUTANEOUS APPROACH: ICD-10-PCS | Performed by: INTERNAL MEDICINE

## 2023-08-29 PROCEDURE — C1894 INTRO/SHEATH, NON-LASER: HCPCS | Performed by: INTERNAL MEDICINE

## 2023-08-29 PROCEDURE — 85730 THROMBOPLASTIN TIME PARTIAL: CPT | Performed by: INTERNAL MEDICINE

## 2023-08-29 PROCEDURE — 93010 ELECTROCARDIOGRAM REPORT: CPT | Performed by: INTERNAL MEDICINE

## 2023-08-29 PROCEDURE — C1874 STENT, COATED/COV W/DEL SYS: HCPCS | Performed by: INTERNAL MEDICINE

## 2023-08-29 PROCEDURE — 25010000002 MIDAZOLAM PER 1 MG: Performed by: INTERNAL MEDICINE

## 2023-08-29 PROCEDURE — 92928 PRQ TCAT PLMT NTRAC ST 1 LES: CPT | Performed by: INTERNAL MEDICINE

## 2023-08-29 PROCEDURE — 25010000002 FENTANYL CITRATE (PF) 50 MCG/ML SOLUTION: Performed by: INTERNAL MEDICINE

## 2023-08-29 PROCEDURE — 25010000002 PHENYLEPHRINE 10 MG/ML SOLUTION: Performed by: INTERNAL MEDICINE

## 2023-08-29 PROCEDURE — C9600 PERC DRUG-EL COR STENT SING: HCPCS | Performed by: INTERNAL MEDICINE

## 2023-08-29 PROCEDURE — 25010000002 HEPARIN (PORCINE) 25000-0.45 UT/250ML-% SOLUTION: Performed by: NURSE PRACTITIONER

## 2023-08-29 PROCEDURE — 63710000001 INSULIN DETEMIR PER 5 UNITS: Performed by: INTERNAL MEDICINE

## 2023-08-29 PROCEDURE — 25010000002 HEPARIN (PORCINE) PER 1000 UNITS: Performed by: INTERNAL MEDICINE

## 2023-08-29 DEVICE — XIENCE SKYPOINT™ EVEROLIMUS ELUTING CORONARY STENT SYSTEM 2.75 MM X 38 MM / RAPID-EXCHANGE
Type: IMPLANTABLE DEVICE | Site: HEART | Status: FUNCTIONAL
Brand: XIENCE SKYPOINT™

## 2023-08-29 DEVICE — XIENCE SKYPOINT™ EVEROLIMUS ELUTING CORONARY STENT SYSTEM 2.50 MM X 12 MM / RAPID-EXCHANGE
Type: IMPLANTABLE DEVICE | Site: HEART | Status: FUNCTIONAL
Brand: XIENCE SKYPOINT™

## 2023-08-29 RX ORDER — HEPARIN SODIUM 1000 [USP'U]/ML
INJECTION, SOLUTION INTRAVENOUS; SUBCUTANEOUS
Status: DISCONTINUED | OUTPATIENT
Start: 2023-08-29 | End: 2023-08-29 | Stop reason: HOSPADM

## 2023-08-29 RX ORDER — ONDANSETRON 2 MG/ML
4 INJECTION INTRAMUSCULAR; INTRAVENOUS EVERY 6 HOURS PRN
Status: DISCONTINUED | OUTPATIENT
Start: 2023-08-29 | End: 2023-08-31 | Stop reason: HOSPADM

## 2023-08-29 RX ORDER — CLOPIDOGREL BISULFATE 75 MG/1
75 TABLET ORAL DAILY
Status: DISCONTINUED | OUTPATIENT
Start: 2023-08-30 | End: 2023-08-31 | Stop reason: HOSPADM

## 2023-08-29 RX ORDER — SODIUM CHLORIDE 0.9 % (FLUSH) 0.9 %
10 SYRINGE (ML) INJECTION EVERY 12 HOURS SCHEDULED
Status: DISCONTINUED | OUTPATIENT
Start: 2023-08-29 | End: 2023-08-31 | Stop reason: HOSPADM

## 2023-08-29 RX ORDER — POLYETHYLENE GLYCOL 3350 17 G/17G
17 POWDER, FOR SOLUTION ORAL DAILY PRN
Status: DISCONTINUED | OUTPATIENT
Start: 2023-08-29 | End: 2023-08-31 | Stop reason: HOSPADM

## 2023-08-29 RX ORDER — ACETAMINOPHEN 325 MG/1
650 TABLET ORAL EVERY 4 HOURS PRN
Status: DISCONTINUED | OUTPATIENT
Start: 2023-08-29 | End: 2023-08-31 | Stop reason: HOSPADM

## 2023-08-29 RX ORDER — NICOTINE POLACRILEX 4 MG
15 LOZENGE BUCCAL
Status: DISCONTINUED | OUTPATIENT
Start: 2023-08-29 | End: 2023-08-31 | Stop reason: HOSPADM

## 2023-08-29 RX ORDER — HEPARIN SODIUM 10000 [USP'U]/100ML
16 INJECTION, SOLUTION INTRAVENOUS
Status: DISCONTINUED | OUTPATIENT
Start: 2023-08-29 | End: 2023-08-29

## 2023-08-29 RX ORDER — BISACODYL 5 MG/1
5 TABLET, DELAYED RELEASE ORAL DAILY PRN
Status: DISCONTINUED | OUTPATIENT
Start: 2023-08-29 | End: 2023-08-31 | Stop reason: HOSPADM

## 2023-08-29 RX ORDER — INSULIN LISPRO 100 [IU]/ML
2-9 INJECTION, SOLUTION INTRAVENOUS; SUBCUTANEOUS
Status: DISCONTINUED | OUTPATIENT
Start: 2023-08-29 | End: 2023-08-31 | Stop reason: HOSPADM

## 2023-08-29 RX ORDER — SODIUM CHLORIDE 0.9 % (FLUSH) 0.9 %
10 SYRINGE (ML) INJECTION AS NEEDED
Status: DISCONTINUED | OUTPATIENT
Start: 2023-08-29 | End: 2023-08-31 | Stop reason: HOSPADM

## 2023-08-29 RX ORDER — INSULIN LISPRO 100 [IU]/ML
2-7 INJECTION, SOLUTION INTRAVENOUS; SUBCUTANEOUS
Refills: 12
Start: 2023-08-29 | End: 2023-08-31 | Stop reason: HOSPADM

## 2023-08-29 RX ORDER — FENTANYL CITRATE 50 UG/ML
INJECTION, SOLUTION INTRAMUSCULAR; INTRAVENOUS
Status: DISCONTINUED | OUTPATIENT
Start: 2023-08-29 | End: 2023-08-29 | Stop reason: HOSPADM

## 2023-08-29 RX ORDER — ATORVASTATIN CALCIUM 40 MG/1
40 TABLET, FILM COATED ORAL NIGHTLY
Status: DISCONTINUED | OUTPATIENT
Start: 2023-08-29 | End: 2023-08-29

## 2023-08-29 RX ORDER — IBUPROFEN 600 MG/1
1 TABLET ORAL
Status: DISCONTINUED | OUTPATIENT
Start: 2023-08-29 | End: 2023-08-31 | Stop reason: HOSPADM

## 2023-08-29 RX ORDER — ACETAMINOPHEN 325 MG/1
650 TABLET ORAL EVERY 4 HOURS PRN
Start: 2023-08-29

## 2023-08-29 RX ORDER — NITROGLYCERIN 0.4 MG/1
0.4 TABLET SUBLINGUAL
Status: DISCONTINUED | OUTPATIENT
Start: 2023-08-29 | End: 2023-08-31 | Stop reason: HOSPADM

## 2023-08-29 RX ORDER — ROSUVASTATIN CALCIUM 20 MG/1
40 TABLET, COATED ORAL NIGHTLY
Status: DISCONTINUED | OUTPATIENT
Start: 2023-08-29 | End: 2023-08-31 | Stop reason: HOSPADM

## 2023-08-29 RX ORDER — DEXTROSE MONOHYDRATE 25 G/50ML
25 INJECTION, SOLUTION INTRAVENOUS
Status: DISCONTINUED | OUTPATIENT
Start: 2023-08-29 | End: 2023-08-31 | Stop reason: HOSPADM

## 2023-08-29 RX ORDER — LISINOPRIL 5 MG/1
2.5 TABLET ORAL DAILY
COMMUNITY
End: 2023-08-31 | Stop reason: HOSPADM

## 2023-08-29 RX ORDER — HEPARIN SODIUM 5000 [USP'U]/ML
25 INJECTION, SOLUTION INTRAVENOUS; SUBCUTANEOUS AS NEEDED
Status: DISCONTINUED | OUTPATIENT
Start: 2023-08-29 | End: 2023-08-29 | Stop reason: HOSPADM

## 2023-08-29 RX ORDER — ACETAMINOPHEN 325 MG/1
650 TABLET ORAL EVERY 4 HOURS PRN
Status: DISCONTINUED | OUTPATIENT
Start: 2023-08-29 | End: 2023-08-29 | Stop reason: SDUPTHER

## 2023-08-29 RX ORDER — LIDOCAINE HYDROCHLORIDE 10 MG/ML
INJECTION, SOLUTION EPIDURAL; INFILTRATION; INTRACAUDAL; PERINEURAL
Status: DISCONTINUED | OUTPATIENT
Start: 2023-08-29 | End: 2023-08-29 | Stop reason: HOSPADM

## 2023-08-29 RX ORDER — LOSARTAN POTASSIUM 25 MG/1
25 TABLET ORAL DAILY
Status: DISCONTINUED | OUTPATIENT
Start: 2023-08-30 | End: 2023-08-31 | Stop reason: HOSPADM

## 2023-08-29 RX ORDER — MIDAZOLAM HYDROCHLORIDE 1 MG/ML
INJECTION INTRAMUSCULAR; INTRAVENOUS
Status: DISCONTINUED | OUTPATIENT
Start: 2023-08-29 | End: 2023-08-29 | Stop reason: HOSPADM

## 2023-08-29 RX ORDER — PHENYLEPHRINE HYDROCHLORIDE 10 MG/ML
INJECTION INTRAVENOUS
Status: DISCONTINUED | OUTPATIENT
Start: 2023-08-29 | End: 2023-08-29 | Stop reason: HOSPADM

## 2023-08-29 RX ORDER — ASPIRIN 81 MG/1
81 TABLET ORAL DAILY
Status: DISCONTINUED | OUTPATIENT
Start: 2023-08-30 | End: 2023-08-31 | Stop reason: HOSPADM

## 2023-08-29 RX ORDER — CLOPIDOGREL BISULFATE 75 MG/1
75 TABLET ORAL DAILY
Status: DISCONTINUED | OUTPATIENT
Start: 2023-08-29 | End: 2023-08-29 | Stop reason: SDUPTHER

## 2023-08-29 RX ORDER — DONEPEZIL HYDROCHLORIDE 5 MG/1
5 TABLET, FILM COATED ORAL NIGHTLY
Status: DISCONTINUED | OUTPATIENT
Start: 2023-08-29 | End: 2023-08-31 | Stop reason: HOSPADM

## 2023-08-29 RX ORDER — HEPARIN SODIUM 10000 [USP'U]/100ML
12 INJECTION, SOLUTION INTRAVENOUS
Start: 2023-08-29 | End: 2023-08-31 | Stop reason: HOSPADM

## 2023-08-29 RX ORDER — ALPRAZOLAM 0.25 MG/1
0.25 TABLET ORAL 3 TIMES DAILY PRN
Status: DISCONTINUED | OUTPATIENT
Start: 2023-08-29 | End: 2023-08-31 | Stop reason: HOSPADM

## 2023-08-29 RX ORDER — NITROGLYCERIN 0.4 MG/1
0.4 TABLET SUBLINGUAL
Status: DISCONTINUED | OUTPATIENT
Start: 2023-08-29 | End: 2023-08-29 | Stop reason: HOSPADM

## 2023-08-29 RX ORDER — SODIUM CHLORIDE 9 MG/ML
40 INJECTION, SOLUTION INTRAVENOUS AS NEEDED
Status: DISCONTINUED | OUTPATIENT
Start: 2023-08-29 | End: 2023-08-31 | Stop reason: HOSPADM

## 2023-08-29 RX ORDER — TADALAFIL 20 MG/1
40 TABLET ORAL DAILY PRN
COMMUNITY

## 2023-08-29 RX ORDER — DONEPEZIL HYDROCHLORIDE 5 MG/1
5 TABLET, FILM COATED ORAL NIGHTLY
Status: ON HOLD
Start: 2023-08-29 | End: 2023-08-30 | Stop reason: SDUPTHER

## 2023-08-29 RX ORDER — HEPARIN SODIUM 10000 [USP'U]/100ML
12 INJECTION, SOLUTION INTRAVENOUS
Status: DISCONTINUED | OUTPATIENT
Start: 2023-08-29 | End: 2023-08-29 | Stop reason: HOSPADM

## 2023-08-29 RX ORDER — ASPIRIN 81 MG/1
81 TABLET, CHEWABLE ORAL DAILY
Status: ON HOLD
Start: 2023-08-29 | End: 2023-08-30 | Stop reason: SDUPTHER

## 2023-08-29 RX ORDER — HEPARIN SODIUM 1000 [USP'U]/ML
50 INJECTION, SOLUTION INTRAVENOUS; SUBCUTANEOUS AS NEEDED
Status: DISCONTINUED | OUTPATIENT
Start: 2023-08-29 | End: 2023-08-29

## 2023-08-29 RX ORDER — ROSUVASTATIN CALCIUM 40 MG/1
40 TABLET, COATED ORAL NIGHTLY
Qty: 90 TABLET | Status: ON HOLD
Start: 2023-08-29 | End: 2023-08-30 | Stop reason: SDUPTHER

## 2023-08-29 RX ORDER — CLOPIDOGREL BISULFATE 75 MG/1
75 TABLET ORAL DAILY
Qty: 30 TABLET | Status: ON HOLD
Start: 2023-08-29 | End: 2023-08-30 | Stop reason: SDUPTHER

## 2023-08-29 RX ORDER — BISACODYL 10 MG
10 SUPPOSITORY, RECTAL RECTAL DAILY PRN
Status: DISCONTINUED | OUTPATIENT
Start: 2023-08-29 | End: 2023-08-31 | Stop reason: HOSPADM

## 2023-08-29 RX ORDER — NICARDIPINE HCL-0.9% SOD CHLOR 1 MG/10 ML
SYRINGE (ML) INTRAVENOUS
Status: DISCONTINUED | OUTPATIENT
Start: 2023-08-29 | End: 2023-08-29 | Stop reason: HOSPADM

## 2023-08-29 RX ORDER — ONDANSETRON 4 MG/1
4 TABLET, FILM COATED ORAL EVERY 6 HOURS PRN
Status: DISCONTINUED | OUTPATIENT
Start: 2023-08-29 | End: 2023-08-31 | Stop reason: HOSPADM

## 2023-08-29 RX ORDER — AMOXICILLIN 250 MG
2 CAPSULE ORAL 2 TIMES DAILY
Status: DISCONTINUED | OUTPATIENT
Start: 2023-08-29 | End: 2023-08-31 | Stop reason: HOSPADM

## 2023-08-29 RX ORDER — HEPARIN SODIUM 1000 [USP'U]/ML
25 INJECTION, SOLUTION INTRAVENOUS; SUBCUTANEOUS AS NEEDED
Status: DISCONTINUED | OUTPATIENT
Start: 2023-08-29 | End: 2023-08-29

## 2023-08-29 RX ORDER — HEPARIN SODIUM 5000 [USP'U]/ML
50 INJECTION, SOLUTION INTRAVENOUS; SUBCUTANEOUS AS NEEDED
Status: DISCONTINUED | OUTPATIENT
Start: 2023-08-29 | End: 2023-08-29 | Stop reason: HOSPADM

## 2023-08-29 RX ORDER — ASPIRIN 81 MG/1
81 TABLET, CHEWABLE ORAL DAILY
Status: DISCONTINUED | OUTPATIENT
Start: 2023-08-29 | End: 2023-08-29 | Stop reason: SDUPTHER

## 2023-08-29 RX ORDER — DIPHENHYDRAMINE HYDROCHLORIDE 50 MG/ML
25 INJECTION INTRAMUSCULAR; INTRAVENOUS EVERY 6 HOURS PRN
Status: DISCONTINUED | OUTPATIENT
Start: 2023-08-29 | End: 2023-08-31 | Stop reason: HOSPADM

## 2023-08-29 RX ORDER — NITROGLYCERIN 0.4 MG/1
0.4 TABLET SUBLINGUAL
Status: DISCONTINUED | OUTPATIENT
Start: 2023-08-29 | End: 2023-08-29 | Stop reason: SDUPTHER

## 2023-08-29 RX ADMIN — CLOPIDOGREL BISULFATE 75 MG: 75 TABLET, FILM COATED ORAL at 09:33

## 2023-08-29 RX ADMIN — METOPROLOL TARTRATE 12.5 MG: 25 TABLET, FILM COATED ORAL at 09:33

## 2023-08-29 RX ADMIN — INSULIN LISPRO 2 UNITS: 100 INJECTION, SOLUTION INTRAVENOUS; SUBCUTANEOUS at 09:33

## 2023-08-29 RX ADMIN — Medication 10 ML: at 09:34

## 2023-08-29 RX ADMIN — DONEPEZIL HYDROCHLORIDE 5 MG: 5 TABLET, FILM COATED ORAL at 20:04

## 2023-08-29 RX ADMIN — INSULIN LISPRO 6 UNITS: 100 INJECTION, SOLUTION INTRAVENOUS; SUBCUTANEOUS at 21:39

## 2023-08-29 RX ADMIN — HEPARIN SODIUM 12 UNITS/KG/HR: 10000 INJECTION, SOLUTION INTRAVENOUS at 14:17

## 2023-08-29 RX ADMIN — ASPIRIN 81 MG: 81 TABLET, CHEWABLE ORAL at 09:33

## 2023-08-29 RX ADMIN — Medication 10 ML: at 20:07

## 2023-08-29 RX ADMIN — METOPROLOL TARTRATE 12.5 MG: 25 TABLET, FILM COATED ORAL at 20:04

## 2023-08-29 RX ADMIN — Medication 220 MG: at 09:33

## 2023-08-29 RX ADMIN — SENNOSIDES AND DOCUSATE SODIUM 2 TABLET: 8.6; 5 TABLET ORAL at 20:04

## 2023-08-29 RX ADMIN — DOCUSATE SODIUM 50 MG AND SENNOSIDES 8.6 MG 2 TABLET: 8.6; 5 TABLET, FILM COATED ORAL at 09:33

## 2023-08-29 RX ADMIN — INSULIN DETEMIR 10 UNITS: 100 INJECTION, SOLUTION SUBCUTANEOUS at 21:39

## 2023-08-29 RX ADMIN — HEPARIN SODIUM 12 UNITS/KG/HR: 10000 INJECTION, SOLUTION INTRAVENOUS at 05:36

## 2023-08-29 RX ADMIN — ROSUVASTATIN 40 MG: 20 TABLET, FILM COATED ORAL at 20:04

## 2023-08-29 NOTE — CASE MANAGEMENT/SOCIAL WORK
Discharge Planning Assessment   Antoine     Patient Name: Giorgio Omer  MRN: [id number]  Today's Date: 8/29/2023    Admit Date: 8/26/2023           Discharge Plan       Row Name 08/29/23 1041       Plan    Final Discharge Disposition Code 02 - short term hospital for  care    Final Note Pt to be transferred to another facility for further intervention.                  Continued Care and Services - Admitted Since 8/26/2023    Coordination has not been started for this encounter.       Expected Discharge Date and Time       Expected Discharge Date Expected Discharge Time    Aug 29, 2023             SAGRARIO MembrenoW

## 2023-08-29 NOTE — H&P
[facility] at [facility]   History and physical    Patient Care Team:  Lurdes Estrada MD as PCP - General (Family Medicine)      PROBLEM LIST:    Coronary artery disease:  Surgical revascularization,  Dr. Bryant, 07/01, [facility], receiving LIMA to LAD, free radial graft to PDA, saphenous graft to first diagonal, saphenous graft to obtuse marginal.  7/24/19 [facility] exercise cardiolite wnl  8/22 [facility] per TM 4.0X 38 Xience to svg- D  patent lima/lad, radial to PDA. 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   to 80% disease.  Diffuse 80% RCA.  80% ostial LIMA.  VG to D2 patent stents with distal 90% lesion.  Occluded graft to circumflex.  Radial graft to PDA not found.  Diabetes mellitus, onset 2009:   8/2023 A1c 10.2   Dyslipidemia:  March 2015:  Total cholesterol 143, triglycerides  326, HDL 39, LDL 38.  8/22 188/185/42/114  Hypertension, presumed essential.   CVD - 4/17 CUS wnl   Baseline right bundle-branch block.   Left Lower Extremity Edema  US LLE: 2016 Partial DVT        Allergies   Allergen Reactions    Cardizem [Diltiazem Hcl] Rash    Celecoxib Rash         No current facility-administered medications for this encounter.    No current facility-administered medications for this encounter.      Medications Prior to Admission   Medication Sig Dispense Refill Last Dose    acetaminophen (TYLENOL) 325 MG tablet Take 2 tablets by mouth Every 4 (Four) Hours As Needed for Mild Pain or Fever (temperature greater than 101F).       aspirin 81 MG chewable tablet Chew 1 tablet Daily.       clopidogrel (PLAVIX) 75 MG tablet Take 1 tablet by mouth Daily. 30 tablet      donepezil (ARICEPT) 5 MG tablet Take 1 tablet by mouth Every Night.       Heparin, Porcine, 09983-6.45 UT/250ML-% infusion Infuse 874 Units/hr into a venous catheter Dose Adjusted By Provider As Needed. Indications: Cardiac or  "other NOT VTE       Insulin Lispro (humaLOG) 100 UNIT/ML injection Inject 2-7 Units under the skin into the appropriate area as directed 4 (Four) Times a Day Before Meals & at Bedtime.  12     metoprolol tartrate (LOPRESSOR) 25 MG tablet Take 0.5 tablets by mouth Every 12 (Twelve) Hours.       rosuvastatin (CRESTOR) 40 MG tablet Take 1 tablet by mouth Every Night. 90 tablet      zinc sulfate (ZINCATE) 220 (50 Zn) MG capsule Take 1 capsule by mouth Daily.            Subjective .   History of present illness:    Patient is a 72-year-old  male who is transferred to [facility] for higher level of care and consideration of high risk intervention.  Patient was admitted at the outside facility with NSTEMI.  Notes that he had chest pain starting on Saturday.  Patient presented to the outside facility.  There he ruled in for NSTEMI and was noted to be significantly hypertensive.  Patient notes blood pressure got under control around .  Further ischemic evaluation was taken with results as noted above in the problem list.  It was felt that the patient may need possible redo CABG versus high risk intervention.  He was transferred here for further evaluation.  Currently blood pressure is controlled.  Patient denies any chest pain, pressure, tightness.  Patient was transferred up with heparin drip.  Patient notes over the course of the last 2 years he has only been intermittently taking his medications as he \"thought I was in good shape\".      Social History     Socioeconomic History    Marital status:    Tobacco Use    Smoking status: Former     Types: Cigarettes     Quit date: 10/28/2001     Years since quittin.8    Smokeless tobacco: Never   Vaping Use    Vaping Use: Never used   Substance and Sexual Activity    Alcohol use: No    Drug use: No    Sexual activity: Defer     Family History   Problem Relation Age of Onset    Ovarian cancer Mother     Hypertension Father     Stroke Father  " "        Review of Systems:  Review of Systems   Constitutional: Positive for malaise/fatigue. Negative for fever.   HENT:  Negative for nosebleeds.    Eyes:  Negative for redness and visual disturbance.   Cardiovascular:  Positive for chest pain. Negative for orthopnea, palpitations and paroxysmal nocturnal dyspnea.   Respiratory:  Negative for cough, snoring, sputum production and wheezing.    Hematologic/Lymphatic: Negative for bleeding problem.   Skin:  Negative for flushing, itching and rash.   Musculoskeletal:  Positive for arthritis. Negative for falls, joint pain and muscle cramps.   Gastrointestinal:  Negative for abdominal pain, diarrhea, heartburn, nausea and vomiting.   Genitourinary:  Negative for hematuria.   Neurological:  Negative for excessive daytime sleepiness, dizziness, headaches, tremors and weakness.   Psychiatric/Behavioral:  Negative for substance abuse. The patient is not nervous/anxious.             Objective   Vitals:  /85 (BP Location: Left arm, Patient Position: Lying)   Pulse 72   Temp 97.6 °F (36.4 °C) (Temporal)   Resp 18   Ht 188 cm (74\")   Wt 78 kg (172 lb)   SpO2 99%   BMI 22.08 kg/m²        Vitals reviewed.   Constitutional:       Appearance: Well-developed and not in distress.   Neck:      Vascular: No JVD.      Trachea: No tracheal deviation.   Pulmonary:      Effort: Pulmonary effort is normal.      Breath sounds: Normal breath sounds.   Cardiovascular:      Normal rate. Regular rhythm.      Murmurs: There is no murmur.      Comments: Right femoral access site benign.  Pulses:     Intact distal pulses.   Edema:     Peripheral edema absent.   Abdominal:      General: Bowel sounds are normal.      Palpations: Abdomen is soft.      Tenderness: There is no abdominal tenderness.   Musculoskeletal:         General: No deformity. Skin:     General: Skin is warm and dry.   Neurological:      Mental Status: Alert and oriented to person, place, and time.            Results " Review:  I reviewed the patient's new clinical results.  Results from last 7 days   Lab Units 08/29/23  0201   WBC 10*3/mm3 8.55   HEMOGLOBIN g/dL 15.0   HEMATOCRIT % 45.3   PLATELETS 10*3/mm3 159     Results from last 7 days   Lab Units 08/29/23  0201 08/28/23  0236 08/27/23  0419   SODIUM mmol/L 134*   < > 135*   POTASSIUM mmol/L 4.1   < > 4.2   CHLORIDE mmol/L 100   < > 102   CO2 mmol/L 21.6*   < > 21.7*   BUN mg/dL 19   < > 16   CREATININE mg/dL 0.72*   < > 0.87   CALCIUM mg/dL 8.8   < > 9.1   BILIRUBIN mg/dL  --   --  1.4*   ALK PHOS U/L  --   --  74   ALT (SGPT) U/L  --   --  18   AST (SGOT) U/L  --   --  117*   GLUCOSE mg/dL 233*   < > 233*    < > = values in this interval not displayed.     Results from last 7 days   Lab Units 08/29/23  0201   SODIUM mmol/L 134*   POTASSIUM mmol/L 4.1   CHLORIDE mmol/L 100   CO2 mmol/L 21.6*   BUN mg/dL 19   CREATININE mg/dL 0.72*   GLUCOSE mg/dL 233*   CALCIUM mg/dL 8.8     Results from last 7 days   Lab Units 08/29/23  0525 08/26/23  1235   INR  0.97 0.91     Lab Results   Lab Value Date/Time    TROPONINT 1,945 (C) 08/29/2023 0410    TROPONINT 1,745 (C) 08/29/2023 0201    TROPONINT 1,483 (C) 08/27/2023 0419    TROPONINT 57 (C) 08/26/2023 1045    TROPONINT 20 (H) 08/26/2023 0841    TROPONINT 0.716 (C) 08/27/2022 0752     Results from last 7 days   Lab Units 08/26/23  1045   TSH uIU/mL 1.150     Results from last 7 days   Lab Units 08/27/23  0419   CHOLESTEROL mg/dL 179   TRIGLYCERIDES mg/dL 127   HDL CHOL mg/dL 51   LDL CHOL mg/dL 105*     Results from last 7 days   Lab Units 08/26/23  1045   PROBNP pg/mL 549.1           Tele:  SR    EKG: SR RBBB      Assessment & Plan     NSTEMI  EF normal by echocardiogram at outside facility  Hypertension  CAD with previous CABG  PAD  8/29/2023 US with occlusive segment midportion RSFA  Dyslipidemia  Diabetes, uncontrolled.  Medical noncompliance.      Plan:    We will proceed to cardiac catheterization plus or minus catheter-based  "intervention.  This was discussed with patient and family.  Further recommendations to follow cardiac catheterization.  Patient will be admitted to telemetry unit.  Resume ASA, Plavix, and Heparin.  We will ask the hospitalist to see secondary to uncontrolled diabetes.  As well as consult diabetes educator.  Have discussed case with primary cardiologist.  The bulk of his symptoms are due to medical noncompliance.  We will resume his medical therapy, and we will attempt to vascularized only \"new\" lesions given that he did well on medical therapy after the last cardiac cath.  Upon review of the films, his radiograph to the RCA was not visualized on the first of the visualized back.  Otherwise the lesion in the vein graft to the diagonal appears to be \"chronic\", and there may be a new stenosis in the poorly visualized circumflex native vessel.  These will be our main targets for revascularization.      ZARINA Gonsalez obtained past medical, family history, social history, review of systems and functioned as a scribe for the remainder of the dictation for Dr. Bowers.     I have seen and examined the patient, I have reviewed the note, discussed the case with the advance practice clinician, made necessary changes and I agree with the final note.    Sriram Bowers MD  08/29/23  16:22 EDT      Dictated utilizing Dragon dictation    "

## 2023-08-29 NOTE — PLAN OF CARE
Goal Outcome Evaluation:  Plan of Care Reviewed With: patient        Progress: improving          Patient resting in bed at this time with no complaints or concerns. Patient has been pleasant and cooperative this shift. IV access maintained, VSS, no s/s of acute distress noted.  Will continue with plan of care.

## 2023-08-29 NOTE — Clinical Note
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 Discharge Note    Progress reporting period is from last progress note on   to Apr 5, 2019.    Mati failed to follow up and current status is unknown.  Please see information below for last relevant information on current status.  Patient seen for 4 visits.    SUBJECTIVE  Subjective changes noted by patient:  Continues to do well.   .  Current pain level is  .     Previous pain level was  4/10.   Changes in function:  Yes (See Goal flowsheet attached for changes in current functional level)  Adverse reaction to treatment or activity: None    OBJECTIVE  Changes noted in objective findings: Reviewed HEP and added wall sit and side step.     ASSESSMENT/PLAN  Diagnosis: LBP   Updated problem list and treatment plan:   Pain - HEP  STG/LTGs have been met or progress has been made towards goals:  Yes, please see goal flowsheet for most current information  Assessment of Progress: current status is unknown.    Last current status: Pt is progressing well   Self Management Plans:  HEP  I have re-evaluated this patient and find that the nature, scope, duration and intensity of the therapy is appropriate for the medical condition of the patient.  Mati continues to require the following intervention to meet STG and LTG's:  HEP.    Recommendations:  Discharge with current home program.  Patient to follow up with MD as needed.    Please refer to the daily flowsheet for treatment today, total treatment time and time spent performing 1:1 timed codes.

## 2023-08-29 NOTE — PROGRESS NOTES
"Enter Query Response Below      Query Response: unable to determine            If applicable, please update the problem list.     Patient: Giorgio Omer        : 1950  Account: [id number]           Admit Date:         How to Respond to this query:       a. Click New Note     b. Answer query within the yellow box.                c. Update the Problem List, if applicable.      If you have any questions about this query contact me at: [email or web address]    Dr. Gaviria,     Patient admitted with NSTEMI s/p cath.  Glucose: 334, 233, 162, 233. Treatment includes insulin sliding scale. Per discharge summary \"Insulin Dependent Diabetes Mellitus Type II, uncontrolled, unknown complications\".    Please further clarify if the patient is treated/monitored for the following:  -Diabetes mellitus with hyperglycemia  -Other- specify_____________  -Unable to determine    By submitting this query, we are merely seeking further clarification of documentation to accurately reflect all conditions that you are monitoring, evaluating, treating or that extend the hospitalization or utilize additional resources of care. Please utilize your independent clinical judgment when addressing the question(s) above.     This query and your response, once completed, will be entered into the legal medical record.    Sincerely,  Jaylyn Gongora RN BSN  Clinical Documentation Integrity Program   "

## 2023-08-29 NOTE — CONSULTS
[facility] Medicine Services  CONSULT NOTE      Patient Name: Giorgio Omer  : 1950  MRN: [id number]    Primary Care Physician: Lurdes Estrada MD  Provider requesting consultation: Joann Oleary MD    Subjective     Reason for Consultation: poorly-controlled diabetes     HPI:  Giorgio Omer is a 72 y.o. male with PMH significant for HTN, HLD, CAD s/p CABG in  PVD and insulin-dependent DMII. Admitted to [facility] -23 for NSTEMI. 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  to 80% disease. Diffuse 80% RCA. 80% ostial LIMA. VG to D2 patent stents with distal 90% lesion. Occluded graft to circumflex. Radial graft to PDA not found. Transferred to [facility] 23 for consideration of redo CABG vs high-risk staged PCI.     He was admitted to cardiology service and hospital medicine consulted for diabetes management.     At time of my exam, Mr. Omer was resting in bed in CV. Son at bedside. He denies chest pain, dyspnea, abdominal pain, nausea or vomiting. [facility] [place] He takes Victoza SQ daily and 20 units of insulin (unsure which one) every night. He sometimes checks his blood sugar in the morning with an average fasting glucose of 150-220. He does not check his glucose throughout the day and does not take any oral medications or prandial insulin. He says he does not smoke or drink but loves his cakes and pies.     Review of Systems   Constitutional: Negative.    HENT: Negative.     Respiratory: Negative.     Cardiovascular: Negative.    Gastrointestinal: Negative.    Genitourinary: Negative.    Musculoskeletal: Negative.    Neurological: Negative.    Psychiatric/Behavioral: Negative.       All other systems reviewed and are negative.     Personal History     Past Medical History:   Diagnosis Date    Arthritis     Coronary artery disease 10/17/2016     Surgical revascularization, Dr. Bryant, 07/01, [facility], receiving LIMA to LAD, free radial graft to PDA, saphenous graft to first diagonal, saphenous graft to obtuse marginal. MPS, May 2008, per [facility]: Normal perfusion, EF 68%.  April 2012 - exercise Cardiolite WNL, EF preserved.    Diabetes mellitus 10/17/2016    on Metformin times one year, onset 2009:  March 2015.  HG A1c 8.6.     Dyslipidemia 10/17/2016    March 2015:  Total cholesterol 143, triglycerides 326, HDL 39, LDL 38.    Hyperlipidemia     Hypertension 10/17/2016    presumed essential.     Right bundle branch block 10/17/2016    baseline     Past Surgical History:   Procedure Laterality Date    CARDIAC CATHETERIZATION N/A 08/26/2022    Procedure: LEFT HEART CATH;  Surgeon: Sriram Bowers MD;  Location:  [facility];  Service: Cardiovascular;  Laterality: N/A;    CARDIAC CATHETERIZATION N/A 8/28/2023    Procedure: Coronary angiography;  Surgeon: Fabiano Duran MD;  Location:  [facility] CATH INVASIVE LOCATION;  Service: Cardiology;  Laterality: N/A;    CIRCUMCISION N/A 2/27/2023    Procedure: CIRCUMCISION;  Surgeon: Himanshu Espinoza MD;  Location: [facility] OR;  Service: Urology;  Laterality: N/A;    COLONOSCOPY      CORONARY ARTERY BYPASS GRAFT  1991     Family History: family history includes Hypertension in his father; Ovarian cancer in his mother; Stroke in his father. Otherwise pertinent FHx was reviewed and unremarkable.     Social History:  reports that he quit smoking about 21 years ago. His smoking use included cigarettes. He has never used smokeless tobacco. He reports that he does not drink alcohol and does not use drugs.    Medications:  Heparin (Porcine), Insulin Lispro, acetaminophen, aspirin, clopidogrel, donepezil, metoprolol tartrate, rosuvastatin, and zinc sulfate    Scheduled Meds:[START ON 8/30/2023] aspirin, 81 mg, Oral, Daily  [START ON 8/30/2023] clopidogrel, 75 mg, Oral, Daily  losartan, 25 mg, Oral,  Daily  metoprolol tartrate, 12.5 mg, Oral, Q12H  rosuvastatin, 40 mg, Oral, Nightly  senna-docusate sodium, 2 tablet, Oral, BID  sodium chloride, 10 mL, Intravenous, Q12H      Continuous Infusions:heparin, 12 Units/kg/hr, Last Rate: 12 Units/kg/hr (08/29/23 1417)  Pharmacy to Dose Heparin,       PRN Meds:.  acetaminophen    senna-docusate sodium **AND** polyethylene glycol **AND** bisacodyl **AND** bisacodyl    nitroglycerin    nitroglycerin    ondansetron **OR** ondansetron    Pharmacy to Dose Heparin    sodium chloride    sodium chloride    Allergies   Allergen Reactions    Cardizem [Diltiazem Hcl] Rash    Celecoxib Rash     Objective     Vital Signs:   Temp:  [97.6 °F (36.4 °C)-99.3 °F (37.4 °C)] 97.6 °F (36.4 °C)  Heart Rate:  [56-99] 72  Resp:  [18] 18  BP: (122-173)/(65-88) 123/85    Physical Exam  Constitutional: No acute distress, awake, alert and conversational. Sitting upright in bed   HENT: NCAT, mucous membranes moist  Respiratory: Clear to auscultation bilaterally, respiratory effort normal   Cardiovascular: RRR, no murmurs, rubs, or gallops  Gastrointestinal: Positive bowel sounds, soft, nontender, nondistended  Musculoskeletal: No bilateral ankle edema  Psychiatric: Appropriate affect, cooperative  Neurologic: Oriented x 3, moves all extremities spontaneously without focal deficits, speech clear  Skin: No rashes    Result Review:  I have personally reviewed the results from the time of admission and agree with these findings:  [x]  Laboratory  [x]  Radiology  [x]  EKG/Telemetry   []  Pathology  [x]  Old records    LAB RESULTS:      Lab 08/29/23  0525 08/29/23  0201 08/28/23  1031 08/28/23  0236 08/27/23  1935 08/27/23  1103 08/27/23  0419 08/26/23  1834 08/26/23  1235 08/26/23  0841   WBC  --  8.55  --   --   --   --  10.64  --   --  8.98   HEMOGLOBIN  --  15.0  --   --   --   --  15.4  --   --  16.0   HEMATOCRIT  --  45.3  --   --   --   --  45.8  --   --  47.3   PLATELETS  --  159  --   --   --   --   193  --   --  186   NEUTROS ABS  --   --   --   --   --   --   --   --   --  6.11   IMMATURE GRANS (ABS)  --   --   --   --   --   --   --   --   --  0.02   LYMPHS ABS  --   --   --   --   --   --   --   --   --  1.96   MONOS ABS  --   --   --   --   --   --   --   --   --  0.47   EOS ABS  --   --   --   --   --   --   --   --   --  0.31   MCV  --  90.2  --   --   --   --  90.0  --   --  89.2   PROTIME 13.4  --   --   --   --   --   --   --  12.7  --    APTT 30.4  --   --   --   --   --   --   --  29.2  --    HEPARIN ANTI-XA <0.10*  --  0.29* 0.16* 0.23* 0.11* 0.20*   < > <0.10*  --     < > = values in this interval not displayed.         Lab 08/29/23  0201 08/28/23  0236 08/27/23  0419 08/26/23  1045 08/26/23  0841   SODIUM 134* 137 135*  --  137   POTASSIUM 4.1 3.8 4.2  --  3.6   CHLORIDE 100 102 102  --  100   CO2 21.6* 23.8 21.7*  --  24.5   ANION GAP 12.4 11.2 11.3  --  12.5   BUN 19 17 16  --  12   CREATININE 0.72* 0.85 0.87  --  0.94   EGFR 96.5 91.8 91.1  --  85.6   GLUCOSE 233* 162* 233*  --  334*   CALCIUM 8.8 8.8 9.1  --  9.1   MAGNESIUM  --   --  2.2  --   --    HEMOGLOBIN A1C  --   --   --   --  10.20*   TSH  --   --   --  1.150  --          Lab 08/27/23  0419 08/26/23  0841   TOTAL PROTEIN 6.6 7.0   ALBUMIN 3.8 4.1   GLOBULIN 2.8 2.9   ALT (SGPT) 18 8   AST (SGOT) 117* 14   BILIRUBIN 1.4* 1.2   ALK PHOS 74 77         Lab 08/29/23  0525 08/29/23  0410 08/29/23  0201 08/27/23  0419 08/26/23  1235 08/26/23  1045 08/26/23  0841   PROBNP  --   --   --   --   --  549.1  --    HSTROP T  --  1,945* 1,745* 1,483*  --  57* 20*   PROTIME 13.4  --   --   --  12.7  --   --    INR 0.97  --   --   --  0.91  --   --          Lab 08/27/23  0419   CHOLESTEROL 179   LDL CHOL 105*   HDL CHOL 51   TRIGLYCERIDES 127     Brief Urine Lab Results  (Last result in the past 365 days)        Color   Clarity   Blood   Leuk Est   Nitrite   Protein   CREAT   Urine HCG        03/07/23 0924 Yellow   Clear   Negative   Negative    Negative   Trace                 Microbiology Results (last 10 days)       ** No results found for the last 240 hours. **          Cardiac Catheterization/Vascular Study    Result Date: 8/28/2023    Mid LAD lesion is 70% stenosed.   Dist LAD lesion is 95% stenosed.   Origin lesion is 80% stenosed.   Insertion lesion is 90% stenosed.   Prox Graft lesion is 100% stenosed.   Prox RCA lesion is 80% stenosed.   Mid RCA lesion is 80% stenosed.   Dist RCA lesion is 80% stenosed.   Prox Graft lesion is 100% stenosed. 1.  Cardiac.  Patient with significant coronary artery disease involving native as well as graft vessels.  Redo bypass surgery will be recommended.  Discussed case with Dr. Hearn and he will call back after reviewing the films     US Arterial Doppler Lower Extremity Right    Result Date: 8/29/2023  EXAMINATION: US ARTERIAL DOPPLER LOWER EXTREMITY  RIGHT-  CLINICAL INDICATION: prior SFA occlusion not follow up on; I16.1-Hypertensive emergency; I21.4-Non-ST elevation (NSTEMI) myocardial infarction; I10-Essential (primary) hypertension; E78.5-Hyperlipidemia, unspecified   COMPARISON: None available.  PROCEDURE: Color Doppler imaging with pulse Doppler waveform to evaluate the right lower extremity arterial system.  FINDINGS: Any stenoses are evaluated using the NASCET or similar method.  FINDINGS: There are monophasic waveforms noted in the superficial femoral popliteal and first portions of the posterior tibial artery.  Occlusive segments in the midportion of the right superficial femoral artery.      Impression: 1. Poor flow diffusely. 2. Occlusive segment in the midportion of the right superficial femoral artery.  This report was finalized on 8/29/2023 8:18 AM by Dr. Fredrick Puckett MD.       Results for orders placed during the hospital encounter of 08/26/23    Adult Transthoracic Echo Complete W/ Cont if Necessary Per Protocol    Interpretation Summary    Left ventricular systolic function is normal. Left  ventricular ejection fraction appears to be 51 - 55%.    Moderate mitral valve regurgitation is present.    Estimated right ventricular systolic pressure from tricuspid regurgitation is normal (<35 mmHg).    There is no evidence of pericardial effusion    Assessment & Plan     There are no hospital problems to display for this patient.    Giorgio Omer is a 72 y.o. male with PMH significant for HTN, HLD, CAD s/p CABG in 2001 PVD and insulin-dependent DMII. Admitted to [facility] 8/26-8/29/23 for NSTEMI. 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  to 80% disease. Diffuse 80% RCA. 80% ostial LIMA. VG to D2 patent stents with distal 90% lesion. Occluded graft to circumflex. Radial graft to PDA not found. Transferred to [facility] 8/29/23 for consideration of redo CABG vs high-risk staged PCI.     He was admitted to cardiology service and hospital medicine consulted for diabetes management.     NSTEMI  Multivessel coronary artery disease   Hyperlipidemia   - Management per cardiology. LHC planned for today  - Continue DAPT, high-intensity statin and Metoprolol   - On Losartan for HTN  - On heparin gtt     Poorly-controlled DMII  - Hgb A1c 10.2%   - On SQ Victoza daily and long-acting insulin 20 units QHS  - Unclear if will get LHC today or tomorrow  - Start with Levemir 10 units QHS + SSI  - Anticipate he will need prandial insulin at DC  - Suspect dietary indiscretion is a large part of his issue   - DM educator and RD to see     HTN  - On Metoprolol / Losartan as above     PVD / SFA occlusion  - Per DC summary from [place], patient underwent arterial duplex at [facility] on 7/2/23 that showed long-segment SFA occlusion with poor flow in the infrapopliteal circulation. He was seen in the ED and vascular surgery was consulted for recommendations. He was discharged on Xarelto 2.5mg BID with plans for outpatient follow up  - Patient reported he  "took anticoagulation for \"a couple of months\" before he stopped  - Started on heparin gtt at OSH and continued here  - Discussed with cardiology who will address post-cath     Thank you for allowing [facility] Medicine Service to provide consultative care for your patient, we will continue to follow while clinically appropriate.    Lamar Grimaldo PA-C  08/29/23            "

## 2023-08-29 NOTE — DISCHARGE SUMMARY
[facility] HOSPITALISTS DISCHARGE SUMMARY    Patient Identification:  Name:  Giorgio Omer  Age:  72 y.o.  Sex:  male  :  1950  MRN:  [id number]  Visit Number:  [id number]    Date of Admission: 2023  Date of Discharge:  2023     PCP: Lurdes Estrada MD    DISCHARGE DIAGNOSIS  NSTEMI, likely Type I - Persisting  Hypertensive Emergency - Resolved   Chest Pain - Resolved   Coronary Artery Disease status post PCI and CABG, now with recurrent severe multi-vessel disease  Peripheral Vascular Disease with recent SFA occlusion  Hypertension  Hyperlipidemia  Insulin Dependent Diabetes Mellitus Type II, uncontrolled, unknown complications    CONSULTS   Consults       Date and Time Order Name Status Description    2023  1:18 PM Inpatient Cardiology Consult Completed           PROCEDURES PERFORMED  Procedure(s) (LRB):  Coronary angiography (N/A)    HOSPITAL COURSE  72M Former Smoker PMH Phimosis status post recent circumcision 2023, Benign Prostatic Hyperplasia, IDDM Type II, Hypertension, Hyperlipidemia, Peripheral Vascular Disease, Coronary Artery Disease status post PCI and CABG in past, presented to [facility] emergency room  with complaints of chest pain, subsequently found to have elevated HS Troponins and suspected NSTEMI Type I vs II, underwent LHC showing significant multivessel disease, Interventional Cardiology without stent placement and have been discussing CABG redo vs High risk PCI with [facility] Interventional Cardiology, overnight patient with chest pain again, HS Troponins post cath continued to rise, Dr. Duran has restarted Heparin drip overnight, last HS troponins up to 1900, this AM I assumed care of patient, he denied any chest pain, dyspnea, nausea, vomiting, diaphoresis, lower extremity swelling and/or orthopnea.  Patient has been accepted at [facility] and will arrange transport. Patient's son at bedside and updated  on plan of care, he was amenable to plan.  Discussed with Cardiology team as well.  Additionally of note, patient with previous arterial Doppler from [facility] on 7/2/2023 showed long segment SFA occlusion with poor flow in the infrapopliteal circulation. Patient was seen in the ED at that time and consultation obtained with vascular surgery who recommended Xarelto 2.5 twice daily and outpatient follow-up with vascular surgery either in [place] or [place].  Patient reports being on anticoagulation for a couple months before stopping which would not be typical standard of care for occlusive arterial disease, also patient noted he did not seek any follow-up as previously recommended.  This admission prior MD has obtained arterial ultrasound for which has been performed but not read.  He is currently on Heparin drip and will continue on transfer.  Follow up study per accepting team at [facility], can discuss with Vascular surgery at that time if felt indicated if shows persisting occlusion.     Edited by: aCrlo Gaviria MD at 8/29/2023 1044    VITAL SIGNS:  Temp:  [98 °F (36.7 °C)-99.3 °F (37.4 °C)] 98 °F (36.7 °C)  Heart Rate:  [56-99] 76  Resp:  [18] 18  BP: (121-173)/(65-88) 134/77  SpO2:  [97 %-100 %] 98 %  on  Flow (L/min):  [2] 2;   Device (Oxygen Therapy): room air    Body mass index is 21.24 kg/m².  Wt Readings from Last 3 Encounters:   08/29/23 73 kg (161 lb)   03/07/23 78.5 kg (173 lb)   02/27/23 78.7 kg (173 lb 9.6 oz)     PHYSICAL EXAM:  Constitutional:  Elderly.  No acute distress.      HENT:  Head:  Normocephalic and atraumatic.  Mouth:  Moist mucous membranes.    Eyes:  Conjunctivae and EOM are normal. No scleral icterus.    Neck:  Neck supple.  No JVD present.    Cardiovascular:  Normal rate, regular rhythm and normal heart sounds with no murmur.  Pulmonary/Chest:  No respiratory distress, no wheezes, no crackles, with normal breath sounds and good air movement.  Abdominal:   Soft. No distension and no tenderness.   Musculoskeletal:  No tenderness, and no deformity.  No red or swollen joints anywhere.    Neurological:  Alert and oriented to person, place, and time.  No cranial nerve deficit.    Skin:  Skin is warm and dry. No rash noted. No pallor.   Peripheral vascular:  No clubbing, no cyanosis, no edema.  Psychiatric: Appropriate mood and affect  Edited by: Carlo Gaviria MD at 8/29/2023 1044    DISCHARGE DISPOSITION   Stable    DISCHARGE MEDICATIONS:     Discharge Medications        New Medications        Instructions Start Date   acetaminophen 325 MG tablet  Commonly known as: TYLENOL   650 mg, Oral, Every 4 Hours PRN      aspirin 81 MG chewable tablet   81 mg, Oral, Daily      clopidogrel 75 MG tablet  Commonly known as: PLAVIX   75 mg, Oral, Daily      donepezil 5 MG tablet  Commonly known as: ARICEPT   5 mg, Oral, Nightly      Heparin (Porcine) 48350-1.45 UT/250ML-% infusion   12 Units/kg/hr (874 Units/hr), Intravenous, Titrated      Insulin Lispro 100 UNIT/ML injection  Commonly known as: humaLOG   2-7 Units, Subcutaneous, 4 Times Daily Before Meals & Nightly      metoprolol tartrate 25 MG tablet  Commonly known as: LOPRESSOR   12.5 mg, Oral, Every 12 Hours Scheduled      rosuvastatin 40 MG tablet  Commonly known as: CRESTOR   40 mg, Oral, Nightly             Continue These Medications        Instructions Start Date   zinc sulfate 220 (50 Zn) MG capsule  Commonly known as: ZINCATE   220 mg, Oral, Daily             Stop These Medications      NON FORMULARY     Saw Palmetto 1000 MG capsule             Follow-up Information       Lurdes Estrada MD .    Specialty: Family Medicine  Contact information:  [address] [place] [id number] [phone]                            TEST  RESULTS PENDING AT DISCHARGE  None     CODE STATUS  Code Status and Medical Interventions:   Ordered at: 08/28/23 1447     Level Of Support Discussed With:    Patient     Code Status (Patient  has no pulse and is not breathing):    CPR (Attempt to Resuscitate)     Medical Interventions (Patient has pulse or is breathing):    Full Support     Carlo Gaviria MD  [facility]  08/29/23  10:44 EDT    Please note that this discharge summary required more than 30 minutes to complete.

## 2023-08-29 NOTE — Clinical Note
First balloon inflation max pressure = 12 silvestre. First balloon inflation duration = 10 seconds. Second inflation of balloon - Max pressure = 14 silvestre. 2nd Inflation of balloon - Duration = 10 seconds. Third inflation of balloon - Max pressure = 15 silvestre. 3rd Inflation of balloon - Duration = 10 seconds. Fourth inflation of balloon - Max pressure = 16 silvestre. 4th Inflation of balloon - Duration = 10 seconds.

## 2023-08-29 NOTE — PROGRESS NOTES
"Enter Query Response Below      Query Response:   Possible type I MI from thrombus in circumflex vessel.  Patient did have multi vessel disease involving his native and bypass graft vessels which may be contributing to his overall ischemic heart disease symptoms             If applicable, please update the problem list.     Patient: Giorgio Omer        : 1950  Account: [id number]           Admit Date:         How to Respond to this query:       a. Click New Note     b. Answer query within the yellow box.                c. Update the Problem List, if applicable.      If you have any questions about this query contact me at: [email or web address]    Dr. Duran,     Patient admitted with hypertensive emergency, NSTEMI (troponins to 194/delta 200) s/p cardiac catheterization. Per cath report  \"  Mid LAD lesion is 70% stenosed.   Dist LAD lesion is 95% stenosed.    Origin lesion is 80% stenosed.   Insertion lesion is 90% stenosed.   Prox Graft lesion is 100% stenosed.   Prox RCA lesion is 80% stenosed.   Mid RCA lesion is 80% stenosed.   Dist RCA lesion is 80% stenosed.   Prox Graft lesion is 100% stenosed.\" Transfer to higher level of care for staged PCI. Per cardiology notes and cath report patient with \"NSTEMI\", \"Type II non-STEMI related to hypertension\".  Per hospitalist notes and d/c summary \"NSTEMI, type I versus type II\" and \"NSTEMI, likely Type I-persisting\".    Please clarify the type of NSTEMI the patient was treated/monitored for:  -Type 1 MI due to ______(please specify- plaque erosion, rupture, fissure or thrombus)  -Type 2 MI due to ______________  -Other- specify______  -Unable to determine    By submitting this query, we are merely seeking further clarification of documentation to accurately reflect all conditions that you are monitoring, evaluating, treating or that extend the hospitalization or utilize additional resources of care. Please utilize your independent clinical judgment when " addressing the question(s) above.     This query and your response, once completed, will be entered into the legal medical record.    Sincerely,  Jaylyn Gongora RN BSN  Clinical Documentation Integrity Program

## 2023-08-29 NOTE — Clinical Note
Hemostasis started on the right radial artery. R-Band was used in achieving hemostasis. Radial compression device applied to vessel. Hemostasis achieved successfully. Closure device additional comment: Tr- 12 ml

## 2023-08-29 NOTE — PROGRESS NOTES
HEPARIN INFUSION  Giorgio Omer is a  72 y.o. male receiving heparin infusion.     Therapy for (VTE/Cardiac):   Cardiac  Patient Weight: 78 kg   Initial Bolus (Y/N):   No   Any Bolus (Y/N):   Yes        Signs or Symptoms of Bleeding: None     Cardiac or Other (Not VTE)   Initial Bolus: 60 units/kg (Max 4,000 units)  Initial rate: 12 units/kg/hr (Max 1,000 units/hr)   Anti Xa Rebolus Infusion Hold time Change infusion Dose (Units/kg/hr) Next Anti Xa or aPTT Level Due   < 0.11 50 Units/kg  (4000 Units Max) None Increase by  3 Units/kg/hr 6 hours   0.11- 0.19 25 Units/kg  (2000 Units Max) None Increase by  2 Units/kg/hr 6 hours   0.2 - 0.29 0 None Increase by  1 Units/kg/hr 6 hours   0.3 - 0.5 0 None No Change 6 hours (after 2 consecutive levels in range check qAM)   0.51 - 0.6 0 None Decrease by  1 Units/kg/hr 6 hours   0.61 - 0.8 0 30 Minutes Decrease by  2 Units/kg/hr 6 hours   0.81 - 1 0 60 Minutes Decrease by  3 Units/kg/hr 6 hours   >1 0 Hold  After Anti Xa less than 0.5 decrease previous rate by  4 Units/kg/hr  Every 2 hours until Anti Xa  less than 0.5 then when infusion restarts in 6 hours     Recommend Xa every 6 hours     Results from last 7 days   Lab Units 08/29/23  0525 08/29/23  0201 08/27/23  0419 08/26/23  1235 08/26/23  0841   INR  0.97  --   --  0.91  --    HEMOGLOBIN g/dL  --  15.0 15.4  --  16.0   HEMATOCRIT %  --  45.3 45.8  --  47.3   PLATELETS 10*3/mm3  --  159 193  --  186          Date   Time   Anti-Xa Current Rate (Unit/kg/hr) Bolus   (Units) Rate Change   (Unit/kg/hr) New Rate (Unit/kg/hr) Next   Anti-Xa Comments  Pump Check Daily   8/29 1414 STAT 12 -- -- 12 STAT DW w/RN. Transfer from OSH with drip running at 12 un/kg/hr. Will adjust based off initial Xa. Higher rate seen on MAR at [place] but weight was different in pump.                                                                                                                                                                                                                                  Dayton Gray,  PharmD  08/29/23  14:16 EDT

## 2023-08-29 NOTE — NURSING NOTE
Patient  info given to [facility] for transfer, PCS faxed to [phone] per dispatch request.

## 2023-08-29 NOTE — Clinical Note
First balloon inflation max pressure = 12 silvestre. First balloon inflation duration = 10 seconds. Second inflation of balloon - Max pressure = 12 silvestre. 2nd Inflation of balloon - Duration = 10 seconds.

## 2023-08-30 PROBLEM — I73.9 PERIPHERAL ARTERY DISEASE: Status: ACTIVE | Noted: 2023-08-30

## 2023-08-30 LAB
ALBUMIN SERPL-MCNC: 3.5 G/DL (ref 3.5–5.2)
ALBUMIN/GLOB SERPL: 1.2 G/DL
ALP SERPL-CCNC: 74 U/L (ref 39–117)
ALT SERPL W P-5'-P-CCNC: 12 U/L (ref 1–41)
ANION GAP SERPL CALCULATED.3IONS-SCNC: 9 MMOL/L (ref 5–15)
AST SERPL-CCNC: 28 U/L (ref 1–40)
BILIRUB SERPL-MCNC: 1.3 MG/DL (ref 0–1.2)
BUN SERPL-MCNC: 20 MG/DL (ref 8–23)
BUN/CREAT SERPL: 22 (ref 7–25)
CALCIUM SPEC-SCNC: 9 MG/DL (ref 8.6–10.5)
CHLORIDE SERPL-SCNC: 99 MMOL/L (ref 98–107)
CHOLEST SERPL-MCNC: 141 MG/DL (ref 0–200)
CO2 SERPL-SCNC: 28 MMOL/L (ref 22–29)
CREAT SERPL-MCNC: 0.91 MG/DL (ref 0.76–1.27)
DEPRECATED RDW RBC AUTO: 37.1 FL (ref 37–54)
EGFRCR SERPLBLD CKD-EPI 2021: 89 ML/MIN/1.73
ERYTHROCYTE [DISTWIDTH] IN BLOOD BY AUTOMATED COUNT: 11.4 % (ref 12.3–15.4)
GLOBULIN UR ELPH-MCNC: 2.9 GM/DL
GLUCOSE BLDC GLUCOMTR-MCNC: 123 MG/DL (ref 70–130)
GLUCOSE BLDC GLUCOMTR-MCNC: 219 MG/DL (ref 70–130)
GLUCOSE BLDC GLUCOMTR-MCNC: 238 MG/DL (ref 70–130)
GLUCOSE BLDC GLUCOMTR-MCNC: 282 MG/DL (ref 70–130)
GLUCOSE SERPL-MCNC: 122 MG/DL (ref 65–99)
HCT VFR BLD AUTO: 45.1 % (ref 37.5–51)
HDLC SERPL-MCNC: 40 MG/DL (ref 40–60)
HGB BLD-MCNC: 15.2 G/DL (ref 13–17.7)
LDLC SERPL CALC-MCNC: 78 MG/DL (ref 0–100)
LDLC/HDLC SERPL: 1.88 {RATIO}
MCH RBC QN AUTO: 29.7 PG (ref 26.6–33)
MCHC RBC AUTO-ENTMCNC: 33.7 G/DL (ref 31.5–35.7)
MCV RBC AUTO: 88.3 FL (ref 79–97)
PLATELET # BLD AUTO: 161 10*3/MM3 (ref 140–450)
PMV BLD AUTO: 12.3 FL (ref 6–12)
POTASSIUM SERPL-SCNC: 3.9 MMOL/L (ref 3.5–5.2)
PROT SERPL-MCNC: 6.4 G/DL (ref 6–8.5)
RBC # BLD AUTO: 5.11 10*6/MM3 (ref 4.14–5.8)
SODIUM SERPL-SCNC: 136 MMOL/L (ref 136–145)
TRIGL SERPL-MCNC: 130 MG/DL (ref 0–150)
VLDLC SERPL-MCNC: 23 MG/DL (ref 5–40)
WBC NRBC COR # BLD: 6.56 10*3/MM3 (ref 3.4–10.8)

## 2023-08-30 PROCEDURE — 63710000001 INSULIN DETEMIR PER 5 UNITS: Performed by: INTERNAL MEDICINE

## 2023-08-30 PROCEDURE — 80053 COMPREHEN METABOLIC PANEL: CPT | Performed by: INTERNAL MEDICINE

## 2023-08-30 PROCEDURE — 63710000001 INSULIN LISPRO (HUMAN) PER 5 UNITS: Performed by: INTERNAL MEDICINE

## 2023-08-30 PROCEDURE — 93010 ELECTROCARDIOGRAM REPORT: CPT | Performed by: INTERNAL MEDICINE

## 2023-08-30 PROCEDURE — 99232 SBSQ HOSP IP/OBS MODERATE 35: CPT

## 2023-08-30 PROCEDURE — 80061 LIPID PANEL: CPT | Performed by: INTERNAL MEDICINE

## 2023-08-30 PROCEDURE — 93005 ELECTROCARDIOGRAM TRACING: CPT | Performed by: NURSE PRACTITIONER

## 2023-08-30 PROCEDURE — 82948 REAGENT STRIP/BLOOD GLUCOSE: CPT

## 2023-08-30 PROCEDURE — 85027 COMPLETE CBC AUTOMATED: CPT | Performed by: INTERNAL MEDICINE

## 2023-08-30 RX ORDER — INSULIN LISPRO 100 [IU]/ML
6 INJECTION, SOLUTION INTRAVENOUS; SUBCUTANEOUS
Qty: 6 ML | Refills: 5 | Status: SHIPPED | OUTPATIENT
Start: 2023-08-30 | End: 2023-09-02 | Stop reason: SDUPTHER

## 2023-08-30 RX ORDER — LOSARTAN POTASSIUM 25 MG/1
25 TABLET ORAL DAILY
Qty: 90 TABLET | Refills: 0 | Status: SHIPPED | OUTPATIENT
Start: 2023-08-31

## 2023-08-30 RX ORDER — ROSUVASTATIN CALCIUM 40 MG/1
40 TABLET, COATED ORAL NIGHTLY
Qty: 90 TABLET | Refills: 3
Start: 2023-08-30

## 2023-08-30 RX ORDER — PEN NEEDLE, DIABETIC 30 GX3/16"
1 NEEDLE, DISPOSABLE MISCELLANEOUS
Qty: 100 EACH | Refills: 5 | Status: SHIPPED | OUTPATIENT
Start: 2023-08-30

## 2023-08-30 RX ORDER — ASPIRIN 81 MG/1
81 TABLET, CHEWABLE ORAL DAILY
Qty: 90 TABLET | Refills: 3
Start: 2023-08-30

## 2023-08-30 RX ORDER — DONEPEZIL HYDROCHLORIDE 5 MG/1
5 TABLET, FILM COATED ORAL NIGHTLY
Qty: 30 TABLET | Refills: 5 | Status: SHIPPED | OUTPATIENT
Start: 2023-08-30

## 2023-08-30 RX ORDER — CLOPIDOGREL BISULFATE 75 MG/1
75 TABLET ORAL DAILY
Qty: 90 TABLET | Refills: 3 | Status: SHIPPED | OUTPATIENT
Start: 2023-08-30

## 2023-08-30 RX ADMIN — INSULIN LISPRO 6 UNITS: 100 INJECTION, SOLUTION INTRAVENOUS; SUBCUTANEOUS at 21:13

## 2023-08-30 RX ADMIN — METOPROLOL TARTRATE 12.5 MG: 25 TABLET, FILM COATED ORAL at 21:12

## 2023-08-30 RX ADMIN — INSULIN DETEMIR 10 UNITS: 100 INJECTION, SOLUTION SUBCUTANEOUS at 21:13

## 2023-08-30 RX ADMIN — SENNOSIDES AND DOCUSATE SODIUM 2 TABLET: 8.6; 5 TABLET ORAL at 08:29

## 2023-08-30 RX ADMIN — Medication 10 ML: at 21:13

## 2023-08-30 RX ADMIN — ROSUVASTATIN 40 MG: 20 TABLET, FILM COATED ORAL at 21:12

## 2023-08-30 RX ADMIN — DONEPEZIL HYDROCHLORIDE 5 MG: 5 TABLET, FILM COATED ORAL at 21:13

## 2023-08-30 RX ADMIN — CLOPIDOGREL BISULFATE 75 MG: 75 TABLET ORAL at 08:29

## 2023-08-30 RX ADMIN — SENNOSIDES AND DOCUSATE SODIUM 2 TABLET: 8.6; 5 TABLET ORAL at 21:12

## 2023-08-30 RX ADMIN — METOPROLOL TARTRATE 12.5 MG: 25 TABLET, FILM COATED ORAL at 08:29

## 2023-08-30 RX ADMIN — ASPIRIN 81 MG: 81 TABLET, COATED ORAL at 08:29

## 2023-08-30 RX ADMIN — LOSARTAN POTASSIUM 25 MG: 25 TABLET, FILM COATED ORAL at 08:29

## 2023-08-30 RX ADMIN — INSULIN LISPRO 4 UNITS: 100 INJECTION, SOLUTION INTRAVENOUS; SUBCUTANEOUS at 12:52

## 2023-08-30 RX ADMIN — INSULIN LISPRO 4 UNITS: 100 INJECTION, SOLUTION INTRAVENOUS; SUBCUTANEOUS at 18:10

## 2023-08-30 RX ADMIN — Medication 10 ML: at 08:29

## 2023-08-30 NOTE — CONSULTS
Reviewed chart for diabetes education consult.  Noted likely to go home today  Noted may go home on meal time insulin.  Noted A1c of 10.2%.  Spoke to Mr. Omer and his son at the bedside -1 hour visit.  Mr. Omer stated that he was giving Victoza in the morning daily.  He stated that he would give it in his stomach.  He stated that he thought it was insulin.  Educated on Victoza and how it works to lower blood sugar.  Explained meal time insulin.  Demonstrated giving insulin by pen injection and by vial and syringe.  Mr. Omer struggled to give the injection by pen independently.  His son was trying to walk him through it.  Explained that getting the right dose with fast acting insulin is really important.  Demonstrated vial and syringe and explained to son that pre-drawing set dosed syringes may a safer way to insure that he is giving the correct dose when he goes back home to live by himself.  Son states that he will be living with someone else for the first week or so at least.  Encouraged them to practice with the pen when at home, but if it is time to live independently again, and he is not comfortable with dosing the pen, he needs to call provider for another plan for medication that is safe.  They stated they understood.    He states that he has a glucometer and checks blood sugar regularly at home.  Son stated they may be interested in CGM.  Explained CGM to them briefly and encouraged them to look further into it.  Son stated that he would like to pursue it so that he can see his fathers blood sugars through the blair.  Explained that insurance would likely cover CGM when on multiple doses of insulin.    Reviewed basic diabetes physiology.  Reviewed A1c.  Reviewed low blood sugar and how to treat blood sugar less than 70 with rule of 15.  Reviewed high blood sugar as well.  Reviewed recommended blood sugar ranges of  before meals. And  2 hours after meals.    He was given our What is Diabetes  handout and our contact information as well as handouts with insulin pen and insulin vial administration technique written down.  Spoke with his nurse and encouraged her to reinforce the importance of his being able to be independent with insulin pen.  Thank you for this referral.

## 2023-08-30 NOTE — PROGRESS NOTES
[facility]  PROGRESS NOTE    Patient Name: Giorgio Omer  : 1950  MRN: [id number]    Date of Admission: 2023  Primary Care Physician: Lurdes Estrada MD    Subjective     CC: f/u NSTEMI, poorly-controlled DMII    HPI:  Feeling fine. Son at bedside. Wants to go home.   [facility] but he [facility] help manage his insulin. He doesn't know if he sees an endocrinologist - offered referral to [facility] endocrinology. He will consider it. He is open to starting prandial insulin after DC     ROS:  Gen- No fevers, chills  CV- No chest pain, palpitations  Resp- No cough, dyspnea  GI- No N/V/D, abd pain    Objective     Vital Signs:   Temp:  [97.6 °F (36.4 °C)-98.7 °F (37.1 °C)] 98.4 °F (36.9 °C)  Heart Rate:  [61-99] 73  Resp:  [16-18] 16  BP: (105-168)/() 118/71  Flow (L/min):  [2] 2     Physical Exam:  Constitutional: No acute distress, awake, alert and conversational. Sitting up in bed   HENT: NCAT, mucous membranes moist  Respiratory: Clear to auscultation bilaterally, respiratory effort normal   Cardiovascular: RRR, no murmurs, rubs, or gallops  Gastrointestinal: Positive bowel sounds, soft, nontender, nondistended  Musculoskeletal: No bilateral ankle edema  Psychiatric: Appropriate affect, cooperative  Neurologic: Oriented x 3, poor recall, moves all extremities spontaneously without focal deficits, speech clear    Results Reviewed:  LAB RESULTS:      Lab 23  0425 23  1414 23  0525 23  0201 23  1031 23  0236 23  1935 23  1103 23  0419 23  1834 23  1235 23  0841   WBC 6.56  --   --  8.55  --   --   --   --  10.64  --   --  8.98   HEMOGLOBIN 15.2  --   --  15.0  --   --   --   --  15.4  --   --  16.0   HEMATOCRIT 45.1  --   --  45.3  --   --   --   --  45.8  --   --  47.3   PLATELETS 161  --   --  159  --   --   --   --  193  --   --  186   NEUTROS ABS  --    --   --   --   --   --   --   --   --   --   --  6.11   IMMATURE GRANS (ABS)  --   --   --   --   --   --   --   --   --   --   --  0.02   LYMPHS ABS  --   --   --   --   --   --   --   --   --   --   --  1.96   MONOS ABS  --   --   --   --   --   --   --   --   --   --   --  0.47   EOS ABS  --   --   --   --   --   --   --   --   --   --   --  0.31   MCV 88.3  --   --  90.2  --   --   --   --  90.0  --   --  89.2   PROTIME  --   --  13.4  --   --   --   --   --   --   --  12.7  --    APTT  --   --  30.4  --   --   --   --   --   --   --  29.2  --    HEPARIN ANTI-XA  --  0.10* <0.10*  --  0.29* 0.16* 0.23*   < > 0.20*   < > <0.10*  --     < > = values in this interval not displayed.         Lab 08/30/23  0425 08/29/23  0201 08/28/23  0236 08/27/23  0419 08/26/23  1045 08/26/23  0841   SODIUM 136 134* 137 135*  --  137   POTASSIUM 3.9 4.1 3.8 4.2  --  3.6   CHLORIDE 99 100 102 102  --  100   CO2 28.0 21.6* 23.8 21.7*  --  24.5   ANION GAP 9.0 12.4 11.2 11.3  --  12.5   BUN 20 19 17 16  --  12   CREATININE 0.91 0.72* 0.85 0.87  --  0.94   EGFR 89.0 96.5 91.8 91.1  --  85.6   GLUCOSE 122* 233* 162* 233*  --  334*   CALCIUM 9.0 8.8 8.8 9.1  --  9.1   MAGNESIUM  --   --   --  2.2  --   --    HEMOGLOBIN A1C  --   --   --   --   --  10.20*   TSH  --   --   --   --  1.150  --          Lab 08/30/23  0425 08/27/23  0419 08/26/23  0841   TOTAL PROTEIN 6.4 6.6 7.0   ALBUMIN 3.5 3.8 4.1   GLOBULIN 2.9 2.8 2.9   ALT (SGPT) 12 18 8   AST (SGOT) 28 117* 14   BILIRUBIN 1.3* 1.4* 1.2   ALK PHOS 74 74 77         Lab 08/29/23  0525 08/29/23  0410 08/29/23  0201 08/27/23  0419 08/26/23  1235 08/26/23  1045 08/26/23  0841   PROBNP  --   --   --   --   --  549.1  --    HSTROP T  --  1,945* 1,745* 1,483*  --  57* 20*   PROTIME 13.4  --   --   --  12.7  --   --    INR 0.97  --   --   --  0.91  --   --          Lab 08/30/23  0425 08/27/23  0419   CHOLESTEROL 141 179   LDL CHOL 78 105*   HDL CHOL 40 51   TRIGLYCERIDES 130 127     Brief Urine  Lab Results  (Last result in the past 365 days)        Color   Clarity   Blood   Leuk Est   Nitrite   Protein   CREAT   Urine HCG        03/07/23 0924 Yellow   Clear   Negative   Negative   Negative   Trace                 Microbiology Results Abnormal       None          Cardiac Catheterization/Vascular Study    Result Date: 8/29/2023    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   Distal left circumflex stenoses, unchanged previous cardiac cath last year   70% SVG anastomosis first diagonal stenosis.  Unchanged from previous study.  Widely patent stents in this vein graft proximally   Severe disease in the RCA with widely patent radial graft to the right PDA   Previous cath at outside hospital showed a widely patent LIMA to LAD.     Cardiac Catheterization/Vascular Study    Result Date: 8/28/2023    Mid LAD lesion is 70% stenosed.   Dist LAD lesion is 95% stenosed.   Origin lesion is 80% stenosed.   Insertion lesion is 90% stenosed.   Prox Graft lesion is 100% stenosed.   Prox RCA lesion is 80% stenosed.   Mid RCA lesion is 80% stenosed.   Dist RCA lesion is 80% stenosed.   Prox Graft lesion is 100% stenosed. 1.  Cardiac.  Patient with significant coronary artery disease involving native as well as graft vessels.  Redo bypass surgery will be recommended.  Discussed case with Dr. Hearn and he will call back after reviewing the films     US Arterial Doppler Lower Extremity Right    Result Date: 8/29/2023  EXAMINATION: US ARTERIAL DOPPLER LOWER EXTREMITY  RIGHT-  CLINICAL INDICATION: prior SFA occlusion not follow up on; I16.1-Hypertensive emergency; I21.4-Non-ST elevation (NSTEMI) myocardial infarction; I10-Essential (primary) hypertension; E78.5-Hyperlipidemia, unspecified   COMPARISON: None available.  PROCEDURE: Color Doppler imaging with pulse Doppler waveform to evaluate the right lower extremity arterial system.  FINDINGS: Any stenoses are evaluated using the NASCET  or similar method.  FINDINGS: There are monophasic waveforms noted in the superficial femoral popliteal and first portions of the posterior tibial artery.  Occlusive segments in the midportion of the right superficial femoral artery.      Impression: 1. Poor flow diffusely. 2. Occlusive segment in the midportion of the right superficial femoral artery.  This report was finalized on 8/29/2023 8:18 AM by Dr. Fredrick Puckett MD.       Results for orders placed during the hospital encounter of 08/26/23    Adult Transthoracic Echo Complete W/ Cont if Necessary Per Protocol    Interpretation Summary    Left ventricular systolic function is normal. Left ventricular ejection fraction appears to be 51 - 55%.    Moderate mitral valve regurgitation is present.    Estimated right ventricular systolic pressure from tricuspid regurgitation is normal (<35 mmHg).    There is no evidence of pericardial effusion    Current medications:  Scheduled Meds:aspirin, 81 mg, Oral, Daily  clopidogrel, 75 mg, Oral, Daily  donepezil, 5 mg, Oral, Nightly  insulin detemir, 10 Units, Subcutaneous, Nightly  insulin lispro, 2-9 Units, Subcutaneous, 4x Daily AC & at Bedtime  losartan, 25 mg, Oral, Daily  metoprolol tartrate, 12.5 mg, Oral, Q12H  pharmacy consult - MTM, , Does not apply, Daily  rosuvastatin, 40 mg, Oral, Nightly  senna-docusate sodium, 2 tablet, Oral, BID  sodium chloride, 10 mL, Intravenous, Q12H      Continuous Infusions:   PRN Meds:.  acetaminophen    ALPRAZolam    senna-docusate sodium **AND** polyethylene glycol **AND** bisacodyl **AND** bisacodyl    dextrose    dextrose    diphenhydrAMINE    glucagon (human recombinant)    nitroglycerin    ondansetron **OR** ondansetron    sodium chloride    sodium chloride    Assessment & Plan     Active Hospital Problems    Diagnosis  POA    **NSTEMI (non-ST elevated myocardial infarction) [I21.4]  Unknown    ACS (acute coronary syndrome) [I24.9]  Yes    Type 1 myocardial infarction [I21.9]  Yes       Resolved Hospital Problems   No resolved problems to display.        Brief Hospital Course to date:  Giorgio Omer is a 72 y.o. male with PMH significant for HTN, HLD, CAD s/p CABG in 2001 PVD and insulin-dependent DMII. Admitted to [facility] 8/26-8/29/23 for NSTEMI. 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  to 80% disease. Diffuse 80% RCA. 80% ostial LIMA. VG to D2 patent stents with distal 90% lesion. Occluded graft to circumflex. Radial graft to PDA not found. Transferred to [facility] 8/29/23 for consideration of redo CABG vs high-risk staged PCI.      He was admitted to cardiology service and hospital medicine consulted for diabetes management.      NSTEMI  Multivessel coronary artery disease   Hyperlipidemia   - Management per cardiology  - 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 Severe disease in RCA with widely patent radial graft to the right PDA, widely patent LIMA to LAD at outside hospital   - Continue DAPT x at least 1 year as well as high-intensity statin and Metoprolol   - On Losartan for HTN     Poorly-controlled DMII  - Hgb A1c 10.2%   - On SQ Victoza daily and long-acting insulin 20 units QHS  - Good control overnight with Levemir 10 units QHS  - I suspect dietary indiscretion is his primary issue. DM educator and RD to see before DC  - Will add prandial insulin at DC. Lispro 6 units TID AC - this is likely not as much as he really needs but I am hesitant to be overly aggressive as his understanding of his medications and health conditions appears to be poor and risk of insulin misuse is high.   - Would benefit from endocrinology evaluation      HTN  - On Metoprolol / Losartan  "as above      PVD / SFA occlusion  - Per DC summary from [facility], patient underwent arterial duplex at [facility] on 7/2/23 that showed long-segment SFA occlusion with poor flow in the infrapopliteal circulation. He was seen in the ED and vascular surgery was consulted for recommendations. He was discharged on Xarelto 2.5mg BID with plans for outpatient follow up  - Patient reported he took anticoagulation for \"a couple of months\" before he stopped  - Cardiology recommends keep outpatient follow up with vascular surgery     ? Cognitive impairment  - Started on Aricept 5mg QHS at OSH. Suspect due to concerns for cognitive impairment. Will send Rx at DC as I agree he could benefit from this     OK for DC home today from hospital medicine perspective. Orders placed    DVT prophylaxis:No DVT prophylaxis order currently exists.     AM-PAC 6 Clicks Score (PT): 17 (08/29/23 2000)    CODE STATUS:   Code Status and Medical Interventions:   Ordered at: 08/29/23 1417     Code Status (Patient has no pulse and is not breathing):    CPR (Attempt to Resuscitate)     Medical Interventions (Patient has pulse or is breathing):    Full Support     Lamar Grimaldo PA-C  08/30/23      "

## 2023-08-30 NOTE — CASE MANAGEMENT/SOCIAL WORK
Continued Stay Note   Layla     Patient Name: Giorgio Omer  MRN: [id number]  Today's Date: 8/30/2023    Admit Date: 8/29/2023    Plan: Home   Discharge Plan       Row Name 08/30/23 1521       Plan    Plan Home    Patient/Family in Agreement with Plan yes    Plan Comments I have met with Mr. Omer and his son, Oscar at the bedside today to discuss the discharge plan.  Mr. Omer states that he plans to return home at discharge.  He denies having any discharge needs and states that his son will be available to provide assistance and transportation as needed.    Final Discharge Disposition Code 01 - home or self-care                   Discharge Codes    No documentation.                 Expected Discharge Date and Time       Expected Discharge Date Expected Discharge Time    Aug 30, 2023               Beth Quinn RN

## 2023-08-30 NOTE — DISCHARGE SUMMARY
"  [facility] at [facility]  PROGRESS NOTE    Date of Admission: 8/29/2023  Date of Service: 08/30/23    Primary Care Physician: Lurdes Estrada MD    Chief Complaint: MV coronary artery disease      Subjective      No acute events overnight. No chest pain or shortness of breath. Has ambulated without difficulty. Requesting to see [facility] rather than [facility].       Objective   Vitals: /64 (BP Location: Left arm, Patient Position: Lying)   Pulse 66   Temp 98.1 °F (36.7 °C) (Oral)   Resp 16   Ht 188 cm (74\")   Wt 78 kg (172 lb)   SpO2 98%   BMI 22.08 kg/m²     Physical Exam:  GENERAL: Alert, cooperative, in no acute distress.   HEENT: Normocephalic, no jugular venous distention  HEART: Regular rhythm, normal rate, and no murmurs, gallops, or rubs.   LUNGS: Clear to auscultation bilaterally. No wheezing, rales or rhonchi.  EXTREMITIES: No clubbing, cyanosis, or edema noted. Radial access site CDI.     Results:  Results from last 7 days   Lab Units 08/30/23  0425 08/29/23  0201 08/27/23  0419   WBC 10*3/mm3 6.56 8.55 10.64   HEMOGLOBIN g/dL 15.2 15.0 15.4   HEMATOCRIT % 45.1 45.3 45.8   PLATELETS 10*3/mm3 161 159 193     Results from last 7 days   Lab Units 08/30/23  0425 08/29/23  0201 08/28/23  0236   SODIUM mmol/L 136 134* 137   POTASSIUM mmol/L 3.9 4.1 3.8   CHLORIDE mmol/L 99 100 102   CO2 mmol/L 28.0 21.6* 23.8   BUN mg/dL 20 19 17   CREATININE mg/dL 0.91 0.72* 0.85   GLUCOSE mg/dL 122* 233* 162*      Lab Results   Component Value Date    CHOL 141 08/30/2023    TRIG 130 08/30/2023    HDL 40 08/30/2023    LDL 78 08/30/2023    AST 28 08/30/2023    ALT 12 08/30/2023     Results from last 7 days   Lab Units 08/26/23  0841   HEMOGLOBIN A1C % 10.20*     Results from last 7 days   Lab Units 08/30/23  0425   CHOLESTEROL mg/dL 141   TRIGLYCERIDES mg/dL 130   HDL CHOL mg/dL 40   LDL CHOL mg/dL 78     Results from last 7 days   Lab Units 08/26/23  1045   TSH uIU/mL 1.150       "   Results from last 7 days   Lab Units 08/29/23  0525 08/26/23  1235   PROTIME Seconds 13.4 12.7   INR  0.97 0.91   APTT seconds 30.4 29.2     Results from last 7 days   Lab Units 08/29/23  0410 08/29/23  0201 08/27/23  0419   HSTROP T ng/L 1,945* 1,745* 1,483*     Results from last 7 days   Lab Units 08/26/23  1045   PROBNP pg/mL 549.1         Intake/Output Summary (Last 24 hours) at 8/30/2023 0844  Last data filed at 8/30/2023 0748  Gross per 24 hour   Intake --   Output 400 ml   Net -400 ml       I personally reviewed the patient's EKG/Telemetry data    Radiology Data:  Results for orders placed during the hospital encounter of 08/26/23    Adult Transthoracic Echo Complete W/ Cont if Necessary Per Protocol    Interpretation Summary    Left ventricular systolic function is normal. Left ventricular ejection fraction appears to be 51 - 55%.    Moderate mitral valve regurgitation is present.    Estimated right ventricular systolic pressure from tricuspid regurgitation is normal (<35 mmHg).    There is no evidence of pericardial effusion    ECG reviewed today: sinus with PAC, RBBB, no acute ST-T changes.     Current Medications:  aspirin, 81 mg, Oral, Daily  clopidogrel, 75 mg, Oral, Daily  donepezil, 5 mg, Oral, Nightly  insulin detemir, 10 Units, Subcutaneous, Nightly  insulin lispro, 2-9 Units, Subcutaneous, 4x Daily AC & at Bedtime  losartan, 25 mg, Oral, Daily  metoprolol tartrate, 12.5 mg, Oral, Q12H  rosuvastatin, 40 mg, Oral, Nightly  senna-docusate sodium, 2 tablet, Oral, BID  sodium chloride, 10 mL, Intravenous, Q12H           Assessment  Coronary artery disease  History of remote CABG x4  NSTEMI with LHC at [facility] 7/28/2023 with multivessel disease and felt to need high risk PCI versus CABG.  Transfer to [facility] with Dr. 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  Severe disease in RCA with widely patent radial graft to the right PDA, widely patent LIMA to LAD at outside hospital  Hypertension  Dyslipidemia  Diabetes, A1c 10.2  Peripheral arterial disease  Arterial duplex and [facility] 7/2023 with long segment SFA occlusion, started on Xarelto and will follow up with vascular as OP.   Of note, Xarelto not listed on home meds list.  Medical noncompliance    Plan  Continue aspirin and plavix for DAPT for at least 1 year.   Continue statin for CAD/Dyslipidemia  Continue metoprolol and losartan for GDMT.   Patient is requesting appointment with our CTS team for his PAD. Will place referral today to be done as an outpatient.   Begin Xarelto 2.5mg BID for PAD as previously prescribed.   Cardiac rehab referral placed today.   Follow up with Dr. Trevino as an outpatient in 4-6 weeks.     Petrona Davis PA-C      Addendum: nurse called and stated that patient has been weak and stumbled backwards. Would like to be seen by PT prior to discharge. Will keep overnight to allow PT to see and give recs.

## 2023-08-30 NOTE — PLAN OF CARE
Goal Outcome Evaluation:  Plan of Care Reviewed With: patient, son  Progress: no change  Outcome Evaluation: VSS, room air, NSR on tele. Pt was supposed to discharge today but appeared very weak. Discharge cancelled and PT consult ordered. Radial and groin sites clean, dry, intact. Diabetes educator and dietician educated pt today. Pt resting with son at bedside.

## 2023-08-30 NOTE — NURSING NOTE
Pt. Referred for Phase II Cardiac Rehab. Staff discussed benefits of exercise, program protocol, and educational material provided. Teach back verified.  Permission granted from patient for staff to fax referral information to outlying program at this time.  Staff faxed referral info to [place]  .

## 2023-08-30 NOTE — CONSULTS
Clinical Nutrition     Nutrition Education   Reason for Visit:   Physician Consult       Patient Name: Giorgio Omer  YOB: 1950  MRN: [id number]  Date of Encounter: 08/30/23 17:15 EDT  Admission date: 8/29/2023        Applicable Diagnoses     DM    Diet/Nutrition Related History:     Pt allows has had prior education Aware of diet but not taught to carb count.       Labs reviewed   Yes    Nutrition Diagnosis   8/30  Problem Food and nutrition knowledge deficit   Related To Carb counting   Signs/Symptoms reported   Status: pt acknowledged information and has materials for reference    Goal:     Increase knowledge on diet/nutrition effects on condition/status     Nutrition Intervention     Education provided regarding nutrition therapy for: Diet rationale, Key food habit change, Consistent Carbohydrate Diet, and Heart Healthy eating    Education provided regarding food habits/behavior related to:Food choices, Eating pattern, Appropriate portions, and Label reading     RD provided printed material via [employer]IT[employer] created education material and Other [employer]ord[employer] matereia      Monitoring/Evaluation:     Pt acknowledged understanding of material covered      Nancy Richardson RD  Time Spent: 30

## 2023-08-30 NOTE — PLAN OF CARE
Goal Outcome Evaluation:              Outcome Evaluation: Pt to discharge today. IV d'c'd. Radial and groin sites clean, dry, intact. Nutrition and diabetes saw and educated pt per hospitalist request.

## 2023-08-31 ENCOUNTER — READMISSION MANAGEMENT (OUTPATIENT)
Dept: CALL CENTER | Facility: HOSPITAL | Age: 73
End: 2023-08-31
Payer: MEDICARE

## 2023-08-31 VITALS
SYSTOLIC BLOOD PRESSURE: 110 MMHG | RESPIRATION RATE: 18 BRPM | HEIGHT: 74 IN | BODY MASS INDEX: 22.07 KG/M2 | WEIGHT: 172 LBS | DIASTOLIC BLOOD PRESSURE: 60 MMHG | TEMPERATURE: 97.9 F | OXYGEN SATURATION: 95 % | HEART RATE: 72 BPM

## 2023-08-31 LAB
GLUCOSE BLDC GLUCOMTR-MCNC: 132 MG/DL (ref 70–130)
GLUCOSE BLDC GLUCOMTR-MCNC: 273 MG/DL (ref 70–130)
QT INTERVAL: 446 MS
QTC INTERVAL: 481 MS

## 2023-08-31 PROCEDURE — 82948 REAGENT STRIP/BLOOD GLUCOSE: CPT

## 2023-08-31 PROCEDURE — 97162 PT EVAL MOD COMPLEX 30 MIN: CPT

## 2023-08-31 PROCEDURE — 63710000001 INSULIN LISPRO (HUMAN) PER 5 UNITS: Performed by: INTERNAL MEDICINE

## 2023-08-31 PROCEDURE — 97116 GAIT TRAINING THERAPY: CPT

## 2023-08-31 PROCEDURE — 99238 HOSP IP/OBS DSCHRG MGMT 30/<: CPT

## 2023-08-31 RX ADMIN — SENNOSIDES AND DOCUSATE SODIUM 2 TABLET: 8.6; 5 TABLET ORAL at 08:42

## 2023-08-31 RX ADMIN — ASPIRIN 81 MG: 81 TABLET, COATED ORAL at 08:42

## 2023-08-31 RX ADMIN — INSULIN LISPRO 6 UNITS: 100 INJECTION, SOLUTION INTRAVENOUS; SUBCUTANEOUS at 11:55

## 2023-08-31 RX ADMIN — CLOPIDOGREL BISULFATE 75 MG: 75 TABLET ORAL at 08:42

## 2023-08-31 RX ADMIN — LOSARTAN POTASSIUM 25 MG: 25 TABLET, FILM COATED ORAL at 08:42

## 2023-08-31 RX ADMIN — METOPROLOL TARTRATE 12.5 MG: 25 TABLET, FILM COATED ORAL at 08:42

## 2023-08-31 NOTE — DISCHARGE PLACEMENT REQUEST
Giorgio Omer (72 y.o. Male)   Patient plans to stay son's home: Juan F Omer  Address:  [facility] [address]        To [facility]  From Amirah(CM at [facility]) [phone]               [facility]  [address] [facility] [address]  Phone:  [phone]  Fax:  [phone] Date: Aug 31, 2023      Ambulatory Referral to Home Health     Patient:  Giorgio Omer MRN:  [id number]   [address] [facility] [address] :  1950  SSN:    Phone: [phone] Sex:  M      INSURANCE PAYOR PLAN GROUP # SUBSCRIBER ID   Primary:  Secondary:    MEDICARE [employer] [id number]  [id number]    [id number]  [id number]      Referring Provider Information:  HAYDEN MATA Phone: [phone] Fax: [phone]       Referral Information:   # Visits:  999 Referral Type: Home Health [42]   Urgency:  Routine Referral Reason: Specialty Services Required   Start Date: Aug 31, 2023 End Date:  To be determined by Insurer   Diagnosis: NSTEMI (non-ST elevated myocardial infarction) (I21.4 [ICD-10-CM] 410.70 [ICD-9-CM])  ACS (acute coronary syndrome) (I24.9 [ICD-10-CM] 411.1 [ICD-9-CM])  PAD (peripheral artery disease) (I73.9 [ICD-10-CM] 443.9 [ICD-9-CM])  Peripheral artery disease (I73.9 [ICD-10-CM] 443.9 [ICD-9-CM])  Type 1 myocardial infarction (I21.9 [ICD-10-CM] 410.91 [ICD-9-CM])      Refer to Dept:   Refer to Provider:   Refer to Provider Phone:   Refer to Facility:       Face to Face Visit Date: 2023  Follow-up provider for Plan of Care? I treated the patient in an acute care facility and will not continue treatment after discharge.  Follow-up provider: TABITHA OVALLE [[id number]]  Reason/Clinical Findings: NonStemi, peripheral artery disease, ACS, DM, decreased mobility  Describe mobility limitations that make leaving home difficult: NonStemi, peripheral artery disease, ACS, DM, decreased mobility  Nursing/Therapeutic Services Requested: Skilled  "Nursing  Nursing/Therapeutic Services Requested: Physical Therapy  Nursing/Therapeutic Services Requested: Occupational Therapy  Skilled nursing orders: Cardiopulmonary assessments  Skilled nursing orders: Neurovascular assessments  PT orders: Home safety assessment  PT orders: Strengthening  Occupational orders: Home safety assessment  Occupational orders: Strengthening  Frequency: 1 Week 1     This document serves as a request of services and does not constitute Insurance authorization or approval of services.  To determine eligibility, please contact the members Insurance carrier to verify and review coverage.     If you have medical questions regarding this request for services. Please contact [address] at [phone] during normal business hours.        Verbal Order Mode: Verbal with readback   Authorizing Provider: Anaid Moran MD  Authorizing Provider's NPI: [id number]     Order Entered By: Beth Marvin RN 8/31/2023  1:27 PM               Date of Birth   1950    Social Security Number       Address   [address] [place] [address]    Home Phone   [phone]    MRN   [id number]       [employer]ism   [place] [employer] [place]    Marital Status                               Admission Date   8/29/23    Admission Type   Urgent    Admitting Provider   Sriram Bowers MD    Attending Provider   Dionicio Trevino MD    Department, Room/Bed   [facility] 6A, N616/1       Discharge Date       Discharge Disposition   Home or Self Care    Discharge Destination                                 Attending Provider: Dionicio Trevino MD    Allergies: Cardizem [Diltiazem Hcl], Celecoxib    Isolation: None   Infection: None   Code Status: CPR    Ht: 188 cm (74\")   Wt: 78 kg (172 lb)    Admission Cmt: None   Principal Problem: NSTEMI (non-ST elevated myocardial infarction) [I21.4]                   Active Insurance as of 8/29/2023       Primary Coverage       Payor Plan " Insurance Group Employer/Plan Group    MEDICARE [place] MEDICARE        Payor Plan Address Payor Plan Phone Number Payor Plan Fax Number Effective Dates    [address] [phone]  10/1/2014 - None Entered    [facility] [id number]         Subscriber Name Subscriber Birth Date Member ID       ROSARIO YANG 1950 [id number]               Secondary Coverage       Payor Plan Insurance Group Employer/Plan Group    [facility] [id number]       Coverage Address Coverage Phone Number Coverage Fax Number Effective Dates    [address] [phone]  5/26/2016 - None Entered    [place] [id number]         Subscriber Name Subscriber Birth Date Member ID       ROSARIO YANG 1950 [id number]                     Emergency Contacts        (Rel.) Home Phone Work Phone Mobile Phone    KanOscar (Son) -- -- [phone]    KanFlorentin (Brother) -- -- [phone]    KanAb (Brother) -- -- [phone]                 History & Physical        Sriram Bowers MD at 08/29/23 1342          [facility]   History and physical    Patient Care Team:  Lurdes Estrada MD as PCP - General (Family Medicine)      PROBLEM LIST:    Coronary artery disease:  Surgical revascularization,  Dr. Bryant, 07/01, [facility], receiving LIMA to LAD, free radial graft to PDA, saphenous graft to first diagonal, saphenous graft to obtuse marginal.  7/24/19 MPS exercise cardiolite wnl  8/22 [facility] per TM 4.0X 38 Xience to svg- D  patent lima/lad, radial to PDA. 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   to 80% disease.  Diffuse 80% RCA.  80% ostial LIMA.  VG to D2 patent stents with distal 90% lesion.  Occluded graft to circumflex.  Radial graft to PDA not found.  Diabetes mellitus, onset 2009:   8/2023 A1c 10.2   Dyslipidemia:  March 2015:  Total  cholesterol 143, triglycerides  326, HDL 39, LDL 38.  8/22 188/185/42/114  Hypertension, presumed essential.   CVD - 4/17 CUS wnl   Baseline right bundle-branch block.   Left Lower Extremity Edema  US LLE: 2016 Partial DVT        Allergies   Allergen Reactions    Cardizem [Diltiazem Hcl] Rash    Celecoxib Rash         No current facility-administered medications for this encounter.    No current facility-administered medications for this encounter.      Medications Prior to Admission   Medication Sig Dispense Refill Last Dose    acetaminophen (TYLENOL) 325 MG tablet Take 2 tablets by mouth Every 4 (Four) Hours As Needed for Mild Pain or Fever (temperature greater than 101F).       aspirin 81 MG chewable tablet Chew 1 tablet Daily.       clopidogrel (PLAVIX) 75 MG tablet Take 1 tablet by mouth Daily. 30 tablet      donepezil (ARICEPT) 5 MG tablet Take 1 tablet by mouth Every Night.       Heparin, Porcine, 51621-6.45 UT/250ML-% infusion Infuse 874 Units/hr into a venous catheter Dose Adjusted By Provider As Needed. Indications: Cardiac or other NOT VTE       Insulin Lispro (humaLOG) 100 UNIT/ML injection Inject 2-7 Units under the skin into the appropriate area as directed 4 (Four) Times a Day Before Meals & at Bedtime.  12     metoprolol tartrate (LOPRESSOR) 25 MG tablet Take 0.5 tablets by mouth Every 12 (Twelve) Hours.       rosuvastatin (CRESTOR) 40 MG tablet Take 1 tablet by mouth Every Night. 90 tablet      zinc sulfate (ZINCATE) 220 (50 Zn) MG capsule Take 1 capsule by mouth Daily.            Subjective.   History of present illness:    Patient is a 72-year-old  male who is transferred to [facility] for higher level of care and consideration of high risk intervention.  Patient was admitted at the outside facility with NSTEMI.  Notes that he had chest pain starting on Saturday.  Patient presented to the outside facility.  There he ruled in for NSTEMI and was noted to be significantly  "hypertensive.  Patient notes blood pressure got under control around .  Further ischemic evaluation was taken with results as noted above in the problem list.  It was felt that the patient may need possible redo CABG versus high risk intervention.  He was transferred here for further evaluation.  Currently blood pressure is controlled.  Patient denies any chest pain, pressure, tightness.  Patient was transferred up with heparin drip.  Patient notes over the course of the last 2 years he has only been intermittently taking his medications as he \"thought I was in good shape\".      Social History     Socioeconomic History    Marital status:    Tobacco Use    Smoking status: Former     Types: Cigarettes     Quit date: 10/28/2001     Years since quittin.8    Smokeless tobacco: Never   Vaping Use    Vaping Use: Never used   Substance and Sexual Activity    Alcohol use: No    Drug use: No    Sexual activity: Defer     Family History   Problem Relation Age of Onset    Ovarian cancer Mother     Hypertension Father     Stroke Father          Review of Systems:  Review of Systems   Constitutional: Positive for malaise/fatigue. Negative for fever.   HENT:  Negative for nosebleeds.    Eyes:  Negative for redness and visual disturbance.   Cardiovascular:  Positive for chest pain. Negative for orthopnea, palpitations and paroxysmal nocturnal dyspnea.   Respiratory:  Negative for cough, snoring, sputum production and wheezing.    Hematologic/Lymphatic: Negative for bleeding problem.   Skin:  Negative for flushing, itching and rash.   Musculoskeletal:  Positive for arthritis. Negative for falls, joint pain and muscle cramps.   Gastrointestinal:  Negative for abdominal pain, diarrhea, heartburn, nausea and vomiting.   Genitourinary:  Negative for hematuria.   Neurological:  Negative for excessive daytime sleepiness, dizziness, headaches, tremors and weakness.   Psychiatric/Behavioral:  Negative for substance abuse. " "The patient is not nervous/anxious.             Objective  Vitals:  /85 (BP Location: Left arm, Patient Position: Lying)   Pulse 72   Temp 97.6 °F (36.4 °C) (Temporal)   Resp 18   Ht 188 cm (74\")   Wt 78 kg (172 lb)   SpO2 99%   BMI 22.08 kg/m²        Vitals reviewed.   Constitutional:       Appearance: Well-developed and not in distress.   Neck:      Vascular: No JVD.      Trachea: No tracheal deviation.   Pulmonary:      Effort: Pulmonary effort is normal.      Breath sounds: Normal breath sounds.   Cardiovascular:      Normal rate. Regular rhythm.      Murmurs: There is no murmur.      Comments: Right femoral access site benign.  Pulses:     Intact distal pulses.   Edema:     Peripheral edema absent.   Abdominal:      General: Bowel sounds are normal.      Palpations: Abdomen is soft.      Tenderness: There is no abdominal tenderness.   Musculoskeletal:         General: No deformity. Skin:     General: Skin is warm and dry.   Neurological:      Mental Status: Alert and oriented to person, place, and time.            Results Review:  I reviewed the patient's new clinical results.  Results from last 7 days   Lab Units 08/29/23  0201   WBC 10*3/mm3 8.55   HEMOGLOBIN g/dL 15.0   HEMATOCRIT % 45.3   PLATELETS 10*3/mm3 159     Results from last 7 days   Lab Units 08/29/23  0201 08/28/23  0236 08/27/23  0419   SODIUM mmol/L 134*   < > 135*   POTASSIUM mmol/L 4.1   < > 4.2   CHLORIDE mmol/L 100   < > 102   CO2 mmol/L 21.6*   < > 21.7*   BUN mg/dL 19   < > 16   CREATININE mg/dL 0.72*   < > 0.87   CALCIUM mg/dL 8.8   < > 9.1   BILIRUBIN mg/dL  --   --  1.4*   ALK PHOS U/L  --   --  74   ALT (SGPT) U/L  --   --  18   AST (SGOT) U/L  --   --  117*   GLUCOSE mg/dL 233*   < > 233*    < > = values in this interval not displayed.     Results from last 7 days   Lab Units 08/29/23  0201   SODIUM mmol/L 134*   POTASSIUM mmol/L 4.1   CHLORIDE mmol/L 100   CO2 mmol/L 21.6*   BUN mg/dL 19   CREATININE mg/dL 0.72* " "  GLUCOSE mg/dL 233*   CALCIUM mg/dL 8.8     Results from last 7 days   Lab Units 08/29/23  0525 08/26/23  1235   INR  0.97 0.91     Lab Results   Lab Value Date/Time    TROPONINT 1,945 (C) 08/29/2023 0410    TROPONINT 1,745 (C) 08/29/2023 0201    TROPONINT 1,483 (C) 08/27/2023 0419    TROPONINT 57 (C) 08/26/2023 1045    TROPONINT 20 (H) 08/26/2023 0841    TROPONINT 0.716 (C) 08/27/2022 0752     Results from last 7 days   Lab Units 08/26/23  1045   TSH uIU/mL 1.150     Results from last 7 days   Lab Units 08/27/23  0419   CHOLESTEROL mg/dL 179   TRIGLYCERIDES mg/dL 127   HDL CHOL mg/dL 51   LDL CHOL mg/dL 105*     Results from last 7 days   Lab Units 08/26/23  1045   PROBNP pg/mL 549.1           Tele:  SR    EKG: SR RBBB      Assessment & Plan    NSTEMI  EF normal by echocardiogram at outside facility  Hypertension  CAD with previous CABG  PAD  8/29/2023 US with occlusive segment midportion RSFA  Dyslipidemia  Diabetes, uncontrolled.  Medical noncompliance.      Plan:    We will proceed to cardiac catheterization plus or minus catheter-based intervention.  This was discussed with patient and family.  Further recommendations to follow cardiac catheterization.  Patient will be admitted to telemetry unit.  Resume ASA, Plavix, and Heparin.  We will ask the hospitalist to see secondary to uncontrolled diabetes.  As well as consult diabetes educator.  Have discussed case with primary cardiologist.  The bulk of his symptoms are due to medical noncompliance.  We will resume his medical therapy, and we will attempt to vascularized only \"new\" lesions given that he did well on medical therapy after the last cardiac cath.  Upon review of the films, his radiograph to the RCA was not visualized on the first of the visualized back.  Otherwise the lesion in the vein graft to the diagonal appears to be \"chronic\", and there may be a new stenosis in the poorly visualized circumflex native vessel.  These will be our main targets for " revascularization.      ZARINA Gonsalez obtained past medical, family history, social history, review of systems and functioned as a scribe for the remainder of the dictation for Dr. Bowers.     I have seen and examined the patient, I have reviewed the note, discussed the case with the advance practice clinician, made necessary changes and I agree with the final note.    Sriram Bowers MD  23  16:22 EDT      Dictated utilizing Dragon dictation      Electronically signed by Sriram Bowers MD at 23 1622          Physical Therapy Notes (most recent note)        Anna Domínguez, PT at 23 0932  Version 1 of 1         Patient Name: Giorgio Omer  : 1950    MRN: [id number]                              Today's Date: 2023       Admit Date: 2023    Visit Dx:     ICD-10-CM ICD-9-CM   1. NSTEMI (non-ST elevated myocardial infarction)  I21.4 410.70   2. ACS (acute coronary syndrome)  I24.9 411.1   3. PAD (peripheral artery disease)  I73.9 443.9     Patient Active Problem List   Diagnosis    Coronary artery disease    Poorly controlled type 2 diabetes mellitus    Dyslipidemia    Hypertension    Right bundle branch block    Peyronie disease    NSTEMI (non-ST elevated myocardial infarction)    Phimosis    Aftercare for circumcision    Erectile dysfunction due to arterial insufficiency    Hypertensive emergency    ACS (acute coronary syndrome)    Type 1 myocardial infarction    Peripheral artery disease     Past Medical History:   Diagnosis Date    Arthritis     Coronary artery disease 10/17/2016    Surgical revascularization, Dr. Bryant, , [facility], receiving LIMA to LAD, free radial graft to PDA, saphenous graft to first diagonal, saphenous graft to obtuse marginal. MPS, May 2008, per C: Normal perfusion, EF 68%.  2012 - exercise Cardiolite WNL, EF preserved.    Diabetes mellitus 10/17/2016    on Metformin times one year, onset :  2015.  HG A1c 8.6.      Dyslipidemia 10/17/2016    March 2015:  Total cholesterol 143, triglycerides 326, HDL 39, LDL 38.    Hyperlipidemia     Hypertension 10/17/2016    presumed essential.     Right bundle branch block 10/17/2016    baseline     Past Surgical History:   Procedure Laterality Date    CARDIAC CATHETERIZATION N/A 08/26/2022    Procedure: LEFT HEART CATH;  Surgeon: Sriram Bowers MD;  Location:  [facility];  Service: Cardiovascular;  Laterality: N/A;    CARDIAC CATHETERIZATION N/A 8/28/2023    Procedure: Coronary angiography;  Surgeon: Fabiano Duran MD;  Location:  [facility];  Service: Cardiology;  Laterality: N/A;    CARDIAC CATHETERIZATION N/A 8/29/2023    Procedure: Percutaneous Coronary Intervention;  Surgeon: Sriram Bowers MD;  Location:  [facility];  Service: Cardiovascular;  Laterality: N/A;    CIRCUMCISION N/A 2/27/2023    Procedure: CIRCUMCISION;  Surgeon: Himanshu Espinoza MD;  Location:  [facility] OR;  Service: Urology;  Laterality: N/A;    COLONOSCOPY      CORONARY ARTERY BYPASS GRAFT  1991      General Information       Row Name 08/31/23 0932          Physical Therapy Time and Intention    Document Type evaluation  -NS     Mode of Treatment individual therapy;physical therapy  -NS       Row Name 08/31/23 0932          General Information    Patient Profile Reviewed yes  -NS     Prior Level of Function independent:;all household mobility;community mobility;gait;transfer;bed mobility;ADL's;using stairs;driving  -NS     Existing Precautions/Restrictions fall  -NS     Barriers to Rehab medically complex  -NS       Row Name 08/31/23 0932          Living Environment    People in Home alone  -NS       Row Name 08/31/23 0932          Home Main Entrance    Number of Stairs, Main Entrance twelve  pt has 6 steps with no HR, then a stoop, then 6 more steps with a R HR  -NS     Stair Railings, Main Entrance railing on right side (ascending)  -NS       Row Name  08/31/23 0932          Stairs Within Home, Primary    Number of Stairs, Within Home, Primary six  -NS     Stair Railings, Within Home, Primary railing on right side (ascending)  -NS     Stairs Comment, Within Home, Primary split level home- 6 steps up and 6 steps down.  -NS       Row Name 08/31/23 0932          Cognition    Orientation Status (Cognition) oriented to;person;place;disoriented to;time  needed cues for month and year  -NS       Row Name 08/31/23 0932          Safety Issues, Functional Mobility    Safety Issues Affecting Function (Mobility) safety precaution awareness;safety precautions follow-through/compliance;sequencing abilities;insight into deficits/self-awareness  -NS     Impairments Affecting Function (Mobility) balance;coordination;strength;endurance/activity tolerance;pain  -NS               User Key  (r) = Recorded By, (t) = Taken By, (c) = Cosigned By      Initials Name Provider Type    Anna Gleason, PT Physical Therapist                   Mobility       Row Name 08/31/23 0932          Bed Mobility    Bed Mobility supine-sit  -NS     Supine-Sit Price (Bed Mobility) standby assist  -NS     Assistive Device (Bed Mobility) head of bed elevated  -NS     Comment, (Bed Mobility) Pt completed without difficulty  -NS       Row Name 08/31/23 0932          Sit-Stand Transfer    Sit-Stand Price (Transfers) contact guard  -NS       Row Name 08/31/23 0932          Gait/Stairs (Locomotion)    [place] Level (Gait) contact guard  -NS     Assistive Device (Gait) cane, straight  -NS     Distance in Feet (Gait) 200+140  -NS     Deviations/Abnormal Patterns (Gait) base of support, narrow;joey decreased;gait speed decreased  -NS     Bilateral Gait Deviations heel strike decreased  -NS     Comment, (Gait/Stairs) Patient ambulated without AD with CGA for 200ft, demonstrating mild unsteadiness and narrow ADARSH. He trialed ambulating 140ft with SPC, continuing to require CGA but pt reporting  feeling more stable. 1 mild unsteady moment requiring Min A to correct.  -NS               User Key  (r) = Recorded By, (t) = Taken By, (c) = Cosigned By      Initials Name Provider Type    Anna Gleason PT Physical Therapist                   Obj/Interventions       Row Name 08/31/23 0932          Range of Motion Comprehensive    General Range of Motion bilateral lower extremity ROM WFL  -NS       [facility] Name 08/31/23 0932          Strength Comprehensive (MMT)    General Manual Muscle Testing (MMT) Assessment lower extremity strength deficits identified  -NS     Comment, General Manual Muscle Testing (MMT) Assessment BLEs: grossly 4+/5  -NS       Row Name 08/31/23 0932          Balance    Balance Assessment sitting static balance;sitting dynamic balance;standing static balance;standing dynamic balance  -NS     Static Sitting Balance independent  -NS     Dynamic Sitting Balance standby assist  -NS     Position, Sitting Balance unsupported;sitting edge of bed  -NS     Static Standing Balance contact guard  -NS     Dynamic Standing Balance contact guard  -NS     Position/Device Used, Standing Balance supported;unsupported;cane, straight  CGA for supported and unsupported  -NS       Row Name 08/31/23 0932          Sensory Assessment (Somatosensory)    Sensory Assessment (Somatosensory) LE sensation intact  -NS               User Key  (r) = Recorded By, (t) = Taken By, (c) = Cosigned By      Initials Name Provider Type    Anna Gleason PT Physical Therapist                   Goals/Plan       Row Name 08/31/23 0932          Bed Mobility Goal 1 (PT)    Activity/Assistive Device (Bed Mobility Goal 1, PT) sit to supine/supine to sit  -NS     [place] Level/Cues Needed (Bed Mobility Goal 1, PT) independent  -NS     Time Frame (Bed Mobility Goal 1, PT) long term goal (LTG);2 weeks  -NS       Row Name 08/31/23 0932          Transfer Goal 1 (PT)    Activity/Assistive Device (Transfer Goal 1, PT)  sit-to-stand/stand-to-sit;bed-to-chair/chair-to-bed  -NS     [place] Level/Cues Needed (Transfer Goal 1, PT) independent  -NS     Time Frame (Transfer Goal 1, PT) long term goal (LTG);2 weeks  -NS       Row Name 08/31/23 0932          Gait Training Goal 1 (PT)    Activity/Assistive Device (Gait Training Goal 1, PT) gait (walking locomotion);assistive device use  -NS     [place] Level (Gait Training Goal 1, PT) modified independence  -NS     Distance (Gait Training Goal 1, PT) 400  -NS     Time Frame (Gait Training Goal 1, PT) long term goal (LTG);2 weeks  -NS       Row Name 08/31/23 0932          Stairs Goal 1 (PT)    Activity/Assistive Device (Stairs Goal 1, PT) ascending stairs;descending stairs  -NS     [place] Level/Cues Needed (Stairs Goal 1, PT) standby assist  -NS     Number of Stairs (Stairs Goal 1, PT) 12  -NS     Time Frame (Stairs Goal 1, PT) long term goal (LTG);2 weeks  -NS       Row Name 08/31/23 0932          Therapy Assessment/Plan (PT)    Planned Therapy Interventions (PT) balance training;bed mobility training;gait training;home exercise program;neuromuscular re-education;stair training;strengthening;patient/family education;transfer training  -NS               User Key  (r) = Recorded By, (t) = Taken By, (c) = Cosigned By      Initials Name Provider Type    Anna Gleason, PT Physical Therapist                   Clinical Impression       Row Name 08/31/23 0932          Pain    Pretreatment Pain Rating 0/10 - no pain  -NS     Posttreatment Pain Rating 0/10 - no pain  -NS     Pre/Posttreatment Pain Comment Pt reports that he has chronic R hip pain that sometimes hurts with walking but denied pain during PT eval.  -NS       Row Name 08/31/23 0932          Plan of Care Review    Plan of Care Reviewed With patient;son  -NS     Progress no change  -NS     Outcome Evaluation Patient presents with weakness, decreased endurance, and balance impairments affecting functional mobility. He  ambulated in hallway without AD and then with SPC, with pt requiring CGA for both episodes of gait but reporting feeling more steady with SPC. Discussed with pt and son the option of pt returning to family member's home until strength improves as pt has several sets of stairs at his own home- pt and son agreeable. Recommend home with family assist and HHPT at d/c.  -NS       Row Name 08/31/23 0932          Therapy Assessment/Plan (PT)    Patient/Family Therapy Goals Statement (PT) return to PLOF  -NS     Rehab Potential (PT) good, to achieve stated therapy goals  -NS     Criteria for Skilled Interventions Met (PT) yes;meets criteria;skilled treatment is necessary  -NS     Therapy Frequency (PT) daily  -NS       Row Name 08/31/23 0932          Vital Signs    Pre Systolic BP Rehab 115  -NS     Pre Treatment Diastolic BP 66  -NS     Post Systolic BP Rehab 125  -NS     Post Treatment Diastolic BP 65  -NS     Pretreatment Heart Rate (beats/min) 77  -NS     Posttreatment Heart Rate (beats/min) 75  -NS     O2 Delivery Pre Treatment room air  -NS     O2 Delivery Intra Treatment room air  -NS     O2 Delivery Post Treatment room air  -NS     Pre Patient Position Supine  -NS     Intra Patient Position Standing  -NS     Post Patient Position Sitting  -NS       Row Name 08/31/23 0932          Positioning and Restraints    Pre-Treatment Position in bed  -NS     Post Treatment Position chair  -NS     In Chair notified nsg;reclined;call light within reach;encouraged to call for assist;exit alarm on;legs elevated;waffle cushion;with family/caregiver  -NS               User Key  (r) = Recorded By, (t) = Taken By, (c) = Cosigned By      Initials Name Provider Type    Anna Gleason PT Physical Therapist                   Outcome Measures       Row Name 08/31/23 0932          How much help from another person do you currently need...    Turning from your back to your side while in flat bed without using bedrails? 4  -NS     Moving  from lying on back to sitting on the side of a flat bed without bedrails? 3  -NS     Moving to and from a bed to a chair (including a wheelchair)? 3  -NS     Standing up from a chair using your arms (e.g., wheelchair, bedside chair)? 3  -NS     Climbing 3-5 steps with a railing? 2  -NS     To walk in hospital room? 3  -NS     AM-PAC 6 Clicks Score (PT) 18  -NS     Highest level of mobility 6 --> Walked 10 steps or more  -NS       Row Name 08/31/23 0932          Functional Assessment    Outcome Measure Options AM-PAC 6 Clicks Basic Mobility (PT)  -NS               User Key  (r) = Recorded By, (t) = Taken By, (c) = Cosigned By      Initials Name Provider Type    Anna Gleason PT Physical Therapist                                 Physical Therapy Education       Title: PT OT SLP Therapies (In Progress)       Topic: Physical Therapy (In Progress)       Point: Mobility training (Done)       Learning Progress Summary             Patient Acceptance, E, VU by NS at 8/31/2023 1120    Comment: purpose of IP PT, benefits and technique for using SPC for ambulation, safe discharge planning, purpose of HHPT for continued therapy and addressing stairs   Family Acceptance, E, VU by NS at 8/31/2023 1120    Comment: purpose of IP PT, benefits and technique for using SPC for ambulation, safe discharge planning, purpose of HHPT for continued therapy and addressing stairs                         Point: Home exercise program (Not Started)       Learner Progress:  Not documented in this visit.              Point: Body mechanics (Done)       Learning Progress Summary             Patient Acceptance, E, VU by NS at 8/31/2023 1120    Comment: purpose of IP PT, benefits and technique for using SPC for ambulation, safe discharge planning, purpose of HHPT for continued therapy and addressing stairs   Family Acceptance, E, VU by NS at 8/31/2023 1120    Comment: purpose of IP PT, benefits and technique for using SPC for ambulation, safe  discharge planning, purpose of HHPT for continued therapy and addressing stairs                         Point: Precautions (Done)       Learning Progress Summary             Patient Acceptance, E, VU by NS at 8/31/2023 1120    Comment: purpose of IP PT, benefits and technique for using SPC for ambulation, safe discharge planning, purpose of HHPT for continued therapy and addressing stairs   Family Acceptance, E, VU by NS at 8/31/2023 1120    Comment: purpose of IP PT, benefits and technique for using SPC for ambulation, safe discharge planning, purpose of HHPT for continued therapy and addressing stairs                                         User Key       Initials Effective Dates Name Provider Type Discipline    NS 06/16/21 -  Anna Domínguez, PT Physical Therapist PT                  PT Recommendation and Plan  Planned Therapy Interventions (PT): balance training, bed mobility training, gait training, home exercise program, neuromuscular re-education, stair training, strengthening, patient/family education, transfer training  Plan of Care Reviewed With: patient, son  Progress: no change  Outcome Evaluation: Patient presents with weakness, decreased endurance, and balance impairments affecting functional mobility. He ambulated in hallway without AD and then with SPC, with pt requiring CGA for both episodes of gait but reporting feeling more steady with SPC. Discussed with pt and son the option of pt returning to family member's home until strength improves as pt has several sets of stairs at his own home- pt and son agreeable. Recommend home with family assist and HHPT at d/c.     Time Calculation:   PT Evaluation Complexity  History, PT Evaluation Complexity: 3 or more personal factors and/or comorbidities  Examination of Body Systems (PT Eval Complexity): total of 4 or more elements  Clinical Presentation (PT Evaluation Complexity): evolving  Clinical Decision Making (PT Evaluation Complexity): moderate  complexity  Overall Complexity (PT Evaluation Complexity): moderate complexity     PT Charges       Row Name 08/31/23 0932             Time Calculation    Start Time 0932  -NS      PT Received On 08/31/23  -NS      PT Goal Re-Cert Due Date 09/10/23  -NS         Timed Charges    98205 - Gait Training Minutes  23  -NS         Untimed Charges    PT Eval/Re-eval Minutes 47  -NS         Total Minutes    Timed Charges Total Minutes 23  -NS      Untimed Charges Total Minutes 47  -NS       Total Minutes 70  -NS                User Key  (r) = Recorded By, (t) = Taken By, (c) = Cosigned By      Initials Name Provider Type    Anna Gleason PT Physical Therapist                  Therapy Charges for Today       Code Description Service Date Service Provider Modifiers Qty    21008512076 HC GAIT TRAINING EA 15 MIN 8/31/2023 Anna Domínguez, PT GP 2    48970603060 HC PT EVAL MOD COMPLEXITY 4 8/31/2023 Anna Domínguez PT GP 1            PT G-Codes  Outcome Measure Options: AM-PAC 6 Clicks Basic Mobility (PT)  AM-PAC 6 Clicks Score (PT): 18  PT Discharge Summary  Anticipated Discharge Disposition (PT): home with assist, home with home health    Anna Domínguez PT  8/31/2023      Electronically signed by Anna Domínguez PT at 08/31/23 1123       Occupational Therapy Notes (most recent note)    No notes exist for this encounter.          Discharge Summary        Petrona Davis PA-C at 08/30/23 0844            [facility] at [facility]  PROGRESS NOTE    Date of Admission: 8/29/2023  Date of Service: 08/30/23    Primary Care Physician: Lurdes Estrada MD    Chief Complaint: MV coronary artery disease      Subjective      No acute events overnight. No chest pain or shortness of breath. Has ambulated without difficulty. Requesting to see [facility] rather than [facility].       Objective   Vitals: /64 (BP Location: Left arm, Patient Position: Lying)   Pulse 66   Temp 98.1 °F (36.7 °C) (Oral)   Resp 16   Ht  "188 cm (74\")   Wt 78 kg (172 lb)   SpO2 98%   BMI 22.08 kg/m²     Physical Exam:  GENERAL: Alert, cooperative, in no acute distress.   HEENT: Normocephalic, no jugular venous distention  HEART: Regular rhythm, normal rate, and no murmurs, gallops, or rubs.   LUNGS: Clear to auscultation bilaterally. No wheezing, rales or rhonchi.  EXTREMITIES: No clubbing, cyanosis, or edema noted. Radial access site CDI.     Results:  Results from last 7 days   Lab Units 08/30/23  0425 08/29/23  0201 08/27/23  0419   WBC 10*3/mm3 6.56 8.55 10.64   HEMOGLOBIN g/dL 15.2 15.0 15.4   HEMATOCRIT % 45.1 45.3 45.8   PLATELETS 10*3/mm3 161 159 193     Results from last 7 days   Lab Units 08/30/23  0425 08/29/23  0201 08/28/23  0236   SODIUM mmol/L 136 134* 137   POTASSIUM mmol/L 3.9 4.1 3.8   CHLORIDE mmol/L 99 100 102   CO2 mmol/L 28.0 21.6* 23.8   BUN mg/dL 20 19 17   CREATININE mg/dL 0.91 0.72* 0.85   GLUCOSE mg/dL 122* 233* 162*      Lab Results   Component Value Date    CHOL 141 08/30/2023    TRIG 130 08/30/2023    HDL 40 08/30/2023    LDL 78 08/30/2023    AST 28 08/30/2023    ALT 12 08/30/2023     Results from last 7 days   Lab Units 08/26/23  0841   HEMOGLOBIN A1C % 10.20*     Results from last 7 days   Lab Units 08/30/23  0425   CHOLESTEROL mg/dL 141   TRIGLYCERIDES mg/dL 130   HDL CHOL mg/dL 40   LDL CHOL mg/dL 78     Results from last 7 days   Lab Units 08/26/23  1045   TSH uIU/mL 1.150         Results from last 7 days   Lab Units 08/29/23  0525 08/26/23  1235   PROTIME Seconds 13.4 12.7   INR  0.97 0.91   APTT seconds 30.4 29.2     Results from last 7 days   Lab Units 08/29/23  0410 08/29/23  0201 08/27/23  0419   HSTROP T ng/L 1,945* 1,745* 1,483*     Results from last 7 days   Lab Units 08/26/23  1045   PROBNP pg/mL 549.1         Intake/Output Summary (Last 24 hours) at 8/30/2023 0844  Last data filed at 8/30/2023 0748  Gross per 24 hour   Intake --   Output 400 ml   Net -400 ml       I personally reviewed the patient's " EKG/Telemetry data    Radiology Data:  Results for orders placed during the hospital encounter of 08/26/23    Adult Transthoracic Echo Complete W/ Cont if Necessary Per Protocol    Interpretation Summary    Left ventricular systolic function is normal. Left ventricular ejection fraction appears to be 51 - 55%.    Moderate mitral valve regurgitation is present.    Estimated right ventricular systolic pressure from tricuspid regurgitation is normal (<35 mmHg).    There is no evidence of pericardial effusion    ECG reviewed today: sinus with PAC, RBBB, no acute ST-T changes.     Current Medications:  aspirin, 81 mg, Oral, Daily  clopidogrel, 75 mg, Oral, Daily  donepezil, 5 mg, Oral, Nightly  insulin detemir, 10 Units, Subcutaneous, Nightly  insulin lispro, 2-9 Units, Subcutaneous, 4x Daily AC & at Bedtime  losartan, 25 mg, Oral, Daily  metoprolol tartrate, 12.5 mg, Oral, Q12H  rosuvastatin, 40 mg, Oral, Nightly  senna-docusate sodium, 2 tablet, Oral, BID  sodium chloride, 10 mL, Intravenous, Q12H           Assessment  Coronary artery disease  History of remote CABG x4  NSTEMI with LHC at  7/28/2023 with multivessel disease and felt to need high risk PCI versus CABG.  Transfer to [facility] with Dr. 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  Severe disease in RCA with widely patent radial graft to the right PDA, widely patent LIMA to LAD at outside hospital  Hypertension  Dyslipidemia  Diabetes, A1c 10.2  Peripheral arterial disease  Arterial duplex and SJL 7/2023 with long segment SFA occlusion, started on Xarelto and will follow up with vascular as OP.   Of note, Xarelto not listed on home meds list.  Medical noncompliance    Plan  Continue aspirin and  "plavix for DAPT for at least 1 year.   Continue statin for CAD/Dyslipidemia  Continue metoprolol and losartan for GDMT.   Patient is requesting appointment with our CTS team for his PAD. Will place referral today to be done as an outpatient.   Begin Xarelto 2.5mg BID for PAD as previously prescribed.   Cardiac rehab referral placed today.   Follow up with Dr. Trevino as an outpatient in 4-6 weeks.     Petrona Davis PA-C      Addendum: nurse called and stated that patient has been weak and stumbled backwards. Would like to be seen by PT prior to discharge. Will keep overnight to allow PT to see and give recs.         Electronically signed by Petrona Davis PA-C at 08/30/23 9255       Petrona Davis PA-C at 08/31/23 1001            [facility]  PROGRESS NOTE    Date of Admission: 8/29/2023  Date of Service: 08/31/23    Primary Care Physician: Lurdes Estrada MD    Chief Complaint: MV coronary artery disease      Subjective      Scheduled to be discharged yesterday. However, patient felt weak and felt like he needed to work with PT first. PT was consulted and he will be seen by them today. He tolerated PT well and would like to go home.      Objective   Vitals: /66 (BP Location: Left arm, Patient Position: Lying)   Pulse 71   Temp 97.9 °F (36.6 °C) (Axillary)   Resp 18   Ht 188 cm (74\")   Wt 78 kg (172 lb)   SpO2 95%   BMI 22.08 kg/m²     Physical Exam:  GENERAL: Alert, cooperative, in no acute distress.   HEENT: Normocephalic, no jugular venous distention  HEART: Regular rhythm, normal rate, and no murmurs, gallops, or rubs.   LUNGS: Clear to auscultation bilaterally. No wheezing, rales or rhonchi.  EXTREMITIES: No clubbing, cyanosis, or edema noted. Radial access site CDI.     Results:  Results from last 7 days   Lab Units 08/30/23  0425 08/29/23  0201 08/27/23  0419   WBC 10*3/mm3 6.56 8.55 10.64   HEMOGLOBIN g/dL 15.2 15.0 15.4   HEMATOCRIT % 45.1 45.3 " 45.8   PLATELETS 10*3/mm3 161 159 193       Results from last 7 days   Lab Units 08/30/23  0425 08/29/23  0201 08/28/23  0236   SODIUM mmol/L 136 134* 137   POTASSIUM mmol/L 3.9 4.1 3.8   CHLORIDE mmol/L 99 100 102   CO2 mmol/L 28.0 21.6* 23.8   BUN mg/dL 20 19 17   CREATININE mg/dL 0.91 0.72* 0.85   GLUCOSE mg/dL 122* 233* 162*        Lab Results   Component Value Date    CHOL 141 08/30/2023    TRIG 130 08/30/2023    HDL 40 08/30/2023    LDL 78 08/30/2023    AST 28 08/30/2023    ALT 12 08/30/2023     Results from last 7 days   Lab Units 08/26/23  0841   HEMOGLOBIN A1C % 10.20*       Results from last 7 days   Lab Units 08/30/23  0425   CHOLESTEROL mg/dL 141   TRIGLYCERIDES mg/dL 130   HDL CHOL mg/dL 40   LDL CHOL mg/dL 78       Results from last 7 days   Lab Units 08/26/23  1045   TSH uIU/mL 1.150           Results from last 7 days   Lab Units 08/29/23  0525 08/26/23  1235   PROTIME Seconds 13.4 12.7   INR  0.97 0.91   APTT seconds 30.4 29.2       Results from last 7 days   Lab Units 08/29/23  0410 08/29/23  0201 08/27/23  0419   HSTROP T ng/L 1,945* 1,745* 1,483*       Results from last 7 days   Lab Units 08/26/23  1045   PROBNP pg/mL 549.1         No intake or output data in the 24 hours ending 08/31/23 1001      I personally reviewed the patient's EKG/Telemetry data    Radiology Data:  Results for orders placed during the hospital encounter of 08/26/23    Adult Transthoracic Echo Complete W/ Cont if Necessary Per Protocol    Interpretation Summary    Left ventricular systolic function is normal. Left ventricular ejection fraction appears to be 51 - 55%.    Moderate mitral valve regurgitation is present.    Estimated right ventricular systolic pressure from tricuspid regurgitation is normal (<35 mmHg).    There is no evidence of pericardial effusion    ECG reviewed today: sinus with PAC, RBBB, no acute ST-T changes.     Current Medications:  aspirin, 81 mg, Oral, Daily  clopidogrel, 75 mg, Oral, Daily  donepezil,  5 mg, Oral, Nightly  insulin detemir, 10 Units, Subcutaneous, Nightly  insulin lispro, 2-9 Units, Subcutaneous, 4x Daily AC & at Bedtime  losartan, 25 mg, Oral, Daily  metoprolol tartrate, 12.5 mg, Oral, Q12H  pharmacy consult - MTM, , Does not apply, Daily  rosuvastatin, 40 mg, Oral, Nightly  senna-docusate sodium, 2 tablet, Oral, BID  sodium chloride, 10 mL, Intravenous, Q12H           Assessment:   Coronary artery disease  History of remote CABG x4  NSTEMI with LHC at [facility] 7/28/2023 with multivessel disease and felt to need high risk PCI versus CABG.  Transfer to [facility] with Dr. 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  Severe disease in RCA with widely patent radial graft to the right PDA, widely patent LIMA to LAD at outside hospital  Hypertension  Dyslipidemia  Diabetes, A1c 10.2  Peripheral arterial disease  Arterial duplex and SJL 7/2023 with long segment SFA occlusion, started on Xarelto and will follow up with vascular as OP.   Of note, Xarelto not listed on home meds list.  Medical noncompliance    Plan:   Continue aspirin and plavix for DAPT for at least 1 year.   Continue statin for CAD/Dyslipidemia  Continue metoprolol and losartan for GDMT.   Patient is requesting appointment with our CTS team for his PAD. Will place referral today to be done as an outpatient.   Begin Xarelto 2.5mg BID for PAD as previously prescribed.   Cardiac rehab referral placed today. To be done in [place].    Follow up with Dr. Trevino as an outpatient in 4-6 weeks.       Petrona Davis PA-C            Electronically signed by Petrona Davis PA-C at 08/31/23 9550

## 2023-08-31 NOTE — DISCHARGE PLACEMENT REQUEST
Giorgio Omer (72 y.o. Male)       Plans to stay with son Oscar Omer at:    [facility] [address] phone No. [phone]    To [facility]  From [place]) at [facility] [phone]             [facility]  [address] [facility] [address]  Phone:  [phone]  Fax:  [phone] Date: Aug 31, 2023      Ambulatory Referral to Home Health     Patient:  Giorgio Omer MRN:  [id number]   [address] [address] :  1950  SSN:    Phone: [phone] Sex:  M      INSURANCE PAYOR PLAN GROUP # SUBSCRIBER ID   Primary:  Secondary:    MEDICARE [employer] [id number]  [id number]    [id number]  [id number]      Referring Provider Information:  HAYDEN MATA Phone: [phone] Fax: [phone]       Referral Information:   # Visits:  999 Referral Type: Home Health [42]   Urgency:  Routine Referral Reason: Specialty Services Required   Start Date: Aug 31, 2023 End Date:  To be determined by Insurer   Diagnosis: NSTEMI (non-ST elevated myocardial infarction) (I21.4 [ICD-10-CM] 410.70 [ICD-9-CM])  ACS (acute coronary syndrome) (I24.9 [ICD-10-CM] 411.1 [ICD-9-CM])  PAD (peripheral artery disease) (I73.9 [ICD-10-CM] 443.9 [ICD-9-CM])  Peripheral artery disease (I73.9 [ICD-10-CM] 443.9 [ICD-9-CM])  Type 1 myocardial infarction (I21.9 [ICD-10-CM] 410.91 [ICD-9-CM])      Refer to Dept:   Refer to Provider:   Refer to Provider Phone:   Refer to Facility:       Face to Face Visit Date: 2023  Follow-up provider for Plan of Care? I treated the patient in an acute care facility and will not continue treatment after discharge.  Follow-up provider: TABITHA OVALLE [[id number]]  Reason/Clinical Findings: NonStemi, peripheral artery disease, ACS, DM, decreased mobility  Describe mobility limitations that make leaving home difficult: NonStemi, peripheral artery disease, ACS, DM, decreased mobility  Nursing/Therapeutic Services Requested: Skilled  "Nursing  Nursing/Therapeutic Services Requested: Physical Therapy  Nursing/Therapeutic Services Requested: Occupational Therapy  Skilled nursing orders: Cardiopulmonary assessments  Skilled nursing orders: Neurovascular assessments  PT orders: Home safety assessment  PT orders: Strengthening  Occupational orders: Home safety assessment  Occupational orders: Strengthening  Frequency: 1 Week 1     This document serves as a request of services and does not constitute Insurance authorization or approval of services.  To determine eligibility, please contact the members Insurance carrier to verify and review coverage.     If you have medical questions regarding this request for services. Please contact [address] at [phone] during normal business hours.        Verbal Order Mode: Verbal with readback   Authorizing Provider: Anaid Moran MD  Authorizing Provider's NPI: [id number]     Order Entered By: Beth Marvin RN 8/31/2023  1:27 PM     Electronically signed by: Anaid Moran MD 8/31/2023  1:31 PM         Date of Birth   1950    Social Security Number       Address   [address] [place] [address]    Home Phone   [phone]    MRN   [id number]       Jehovah's witness   [facility]    Marital Status                               Admission Date   8/29/23    Admission Type   Urgent    Admitting Provider   Sriram Bowers MD    Attending Provider   Dionicio Trevino MD    Department, Room/Bed   [facility], N616/1       Discharge Date       Discharge Disposition   Home or Self Care    Discharge Destination                                 Attending Provider: Dionicio Trevino MD    Allergies: Cardizem [Diltiazem Hcl], Celecoxib    Isolation: None   Infection: None   Code Status: CPR    Ht: 188 cm (74\")   Wt: 78 kg (172 lb)    Admission Cmt: None   Principal Problem: NSTEMI (non-ST elevated myocardial infarction) [I21.4]                   Active " Insurance as of 8/29/2023       Primary Coverage       Payor Plan Insurance Group Employer/Plan Group    MEDICARE RAILROAD MEDICARE        Payor Plan Address Payor Plan Phone Number Payor Plan Fax Number Effective Dates    [address] [phone]  10/1/2014 - None Entered    [facility], located within [facility] [address]         Subscriber Name Subscriber Birth Date Member ID       ROSARIO YANG 1950 [id number]               Secondary Coverage       Payor Plan Insurance Group Employer/Plan Group    [facility] [id number]       Coverage Address Coverage Phone Number Coverage Fax Number Effective Dates    [address] [phone]  5/26/2016 - None Entered    [place] [address]         Subscriber Name Subscriber Birth Date Member ID       ROSARIO YANG 1950 [id number]                     Emergency Contacts        (Rel.) Home Phone Work Phone Mobile Phone    KanOscar (Son) -- -- [phone]    KanFlorentin (Brother) -- -- [phone]    KanAb (Brother) -- -- [phone]                 History & Physical        Sriram Bowers MD at 08/29/23 1342          [facility]   History and physical    Patient Care Team:  Lurdes Estrada MD as PCP - General (Family Medicine)      PROBLEM LIST:    Coronary artery disease:  Surgical revascularization,  Dr. Bryant, 07/01, [facility], receiving LIMA to LAD, free radial graft to PDA, saphenous graft to first diagonal, saphenous graft to obtuse marginal.  7/24/19 MPS exercise cardiolite wnl  8/22 [facility] per TM 4.0X 38 Xience to svg- D  patent lima/lad, radial to PDA. 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   to 80% disease.  Diffuse 80% RCA.  80% ostial LIMA.  VG to D2 patent stents with distal 90% lesion.  Occluded graft to circumflex.  Radial graft to PDA not found.  Diabetes mellitus, onset  2009:   8/2023 A1c 10.2   Dyslipidemia:  March 2015:  Total cholesterol 143, triglycerides  326, HDL 39, LDL 38.  8/22 188/185/42/114  Hypertension, presumed essential.   CVD - 4/17 CUS wnl   Baseline right bundle-branch block.   Left Lower Extremity Edema  US LLE: 2016 Partial DVT        Allergies   Allergen Reactions    Cardizem [Diltiazem Hcl] Rash    Celecoxib Rash         No current facility-administered medications for this encounter.    No current facility-administered medications for this encounter.      Medications Prior to Admission   Medication Sig Dispense Refill Last Dose    acetaminophen (TYLENOL) 325 MG tablet Take 2 tablets by mouth Every 4 (Four) Hours As Needed for Mild Pain or Fever (temperature greater than 101F).       aspirin 81 MG chewable tablet Chew 1 tablet Daily.       clopidogrel (PLAVIX) 75 MG tablet Take 1 tablet by mouth Daily. 30 tablet      donepezil (ARICEPT) 5 MG tablet Take 1 tablet by mouth Every Night.       Heparin, Porcine, 97769-4.45 UT/250ML-% infusion Infuse 874 Units/hr into a venous catheter Dose Adjusted By Provider As Needed. Indications: Cardiac or other NOT VTE       Insulin Lispro (humaLOG) 100 UNIT/ML injection Inject 2-7 Units under the skin into the appropriate area as directed 4 (Four) Times a Day Before Meals & at Bedtime.  12     metoprolol tartrate (LOPRESSOR) 25 MG tablet Take 0.5 tablets by mouth Every 12 (Twelve) Hours.       rosuvastatin (CRESTOR) 40 MG tablet Take 1 tablet by mouth Every Night. 90 tablet      zinc sulfate (ZINCATE) 220 (50 Zn) MG capsule Take 1 capsule by mouth Daily.            Subjective.   History of present illness:    Patient is a 72-year-old  male who is transferred to [facility] for higher level of care and consideration of high risk intervention.  Patient was admitted at the outside facility with NSTEMI.  Notes that he had chest pain starting on Saturday.  Patient presented to the outside facility.  There he  "ruled in for NSTEMI and was noted to be significantly hypertensive.  Patient notes blood pressure got under control around .  Further ischemic evaluation was taken with results as noted above in the problem list.  It was felt that the patient may need possible redo CABG versus high risk intervention.  He was transferred here for further evaluation.  Currently blood pressure is controlled.  Patient denies any chest pain, pressure, tightness.  Patient was transferred up with heparin drip.  Patient notes over the course of the last 2 years he has only been intermittently taking his medications as he \"thought I was in good shape\".      Social History     Socioeconomic History    Marital status:    Tobacco Use    Smoking status: Former     Types: Cigarettes     Quit date: 10/28/2001     Years since quittin.8    Smokeless tobacco: Never   Vaping Use    Vaping Use: Never used   Substance and Sexual Activity    Alcohol use: No    Drug use: No    Sexual activity: Defer     Family History   Problem Relation Age of Onset    Ovarian cancer Mother     Hypertension Father     Stroke Father          Review of Systems:  Review of Systems   Constitutional: Positive for malaise/fatigue. Negative for fever.   HENT:  Negative for nosebleeds.    Eyes:  Negative for redness and visual disturbance.   Cardiovascular:  Positive for chest pain. Negative for orthopnea, palpitations and paroxysmal nocturnal dyspnea.   Respiratory:  Negative for cough, snoring, sputum production and wheezing.    Hematologic/Lymphatic: Negative for bleeding problem.   Skin:  Negative for flushing, itching and rash.   Musculoskeletal:  Positive for arthritis. Negative for falls, joint pain and muscle cramps.   Gastrointestinal:  Negative for abdominal pain, diarrhea, heartburn, nausea and vomiting.   Genitourinary:  Negative for hematuria.   Neurological:  Negative for excessive daytime sleepiness, dizziness, headaches, tremors and weakness. " "  Psychiatric/Behavioral:  Negative for substance abuse. The patient is not nervous/anxious.             Objective  Vitals:  /85 (BP Location: Left arm, Patient Position: Lying)   Pulse 72   Temp 97.6 °F (36.4 °C) (Temporal)   Resp 18   Ht 188 cm (74\")   Wt 78 kg (172 lb)   SpO2 99%   BMI 22.08 kg/m²        Vitals reviewed.   Constitutional:       Appearance: Well-developed and not in distress.   Neck:      Vascular: No JVD.      Trachea: No tracheal deviation.   Pulmonary:      Effort: Pulmonary effort is normal.      Breath sounds: Normal breath sounds.   Cardiovascular:      Normal rate. Regular rhythm.      Murmurs: There is no murmur.      Comments: Right femoral access site benign.  Pulses:     Intact distal pulses.   Edema:     Peripheral edema absent.   Abdominal:      General: Bowel sounds are normal.      Palpations: Abdomen is soft.      Tenderness: There is no abdominal tenderness.   Musculoskeletal:         General: No deformity. Skin:     General: Skin is warm and dry.   Neurological:      Mental Status: Alert and oriented to person, place, and time.            Results Review:  I reviewed the patient's new clinical results.  Results from last 7 days   Lab Units 08/29/23  0201   WBC 10*3/mm3 8.55   HEMOGLOBIN g/dL 15.0   HEMATOCRIT % 45.3   PLATELETS 10*3/mm3 159     Results from last 7 days   Lab Units 08/29/23  0201 08/28/23  0236 08/27/23  0419   SODIUM mmol/L 134*   < > 135*   POTASSIUM mmol/L 4.1   < > 4.2   CHLORIDE mmol/L 100   < > 102   CO2 mmol/L 21.6*   < > 21.7*   BUN mg/dL 19   < > 16   CREATININE mg/dL 0.72*   < > 0.87   CALCIUM mg/dL 8.8   < > 9.1   BILIRUBIN mg/dL  --   --  1.4*   ALK PHOS U/L  --   --  74   ALT (SGPT) U/L  --   --  18   AST (SGOT) U/L  --   --  117*   GLUCOSE mg/dL 233*   < > 233*    < > = values in this interval not displayed.     Results from last 7 days   Lab Units 08/29/23  0201   SODIUM mmol/L 134*   POTASSIUM mmol/L 4.1   CHLORIDE mmol/L 100   CO2 " "mmol/L 21.6*   BUN mg/dL 19   CREATININE mg/dL 0.72*   GLUCOSE mg/dL 233*   CALCIUM mg/dL 8.8     Results from last 7 days   Lab Units 08/29/23  0525 08/26/23  1235   INR  0.97 0.91     Lab Results   Lab Value Date/Time    TROPONINT 1,945 (C) 08/29/2023 0410    TROPONINT 1,745 (C) 08/29/2023 0201    TROPONINT 1,483 (C) 08/27/2023 0419    TROPONINT 57 (C) 08/26/2023 1045    TROPONINT 20 (H) 08/26/2023 0841    TROPONINT 0.716 (C) 08/27/2022 0752     Results from last 7 days   Lab Units 08/26/23  1045   TSH uIU/mL 1.150     Results from last 7 days   Lab Units 08/27/23  0419   CHOLESTEROL mg/dL 179   TRIGLYCERIDES mg/dL 127   HDL CHOL mg/dL 51   LDL CHOL mg/dL 105*     Results from last 7 days   Lab Units 08/26/23  1045   PROBNP pg/mL 549.1           Tele:  SR    EKG: SR RBBB      Assessment & Plan    NSTEMI  EF normal by echocardiogram at outside facility  Hypertension  CAD with previous CABG  PAD  8/29/2023 US with occlusive segment midportion RSFA  Dyslipidemia  Diabetes, uncontrolled.  Medical noncompliance.      Plan:    We will proceed to cardiac catheterization plus or minus catheter-based intervention.  This was discussed with patient and family.  Further recommendations to follow cardiac catheterization.  Patient will be admitted to telemetry unit.  Resume ASA, Plavix, and Heparin.  We will ask the hospitalist to see secondary to uncontrolled diabetes.  As well as consult diabetes educator.  Have discussed case with primary cardiologist.  The bulk of his symptoms are due to medical noncompliance.  We will resume his medical therapy, and we will attempt to vascularized only \"new\" lesions given that he did well on medical therapy after the last cardiac cath.  Upon review of the films, his radiograph to the RCA was not visualized on the first of the visualized back.  Otherwise the lesion in the vein graft to the diagonal appears to be \"chronic\", and there may be a new stenosis in the poorly visualized circumflex " "native vessel.  These will be our main targets for revascularization.      ZARINA Gonsalez obtained past medical, family history, social history, review of systems and functioned as a scribe for the remainder of the dictation for Dr. Bowers.     I have seen and examined the patient, I have reviewed the note, discussed the case with the advance practice clinician, made necessary changes and I agree with the final note.    Sriram Bowers MD  08/29/23  16:22 EDT      Dictated utilizing Dragon dictation      Electronically signed by Sriram Bowers MD at 08/29/23 1622          Discharge Summary        Petrona Davis PA-C at 08/30/23 0844            [facility] at [facility]  PROGRESS NOTE    Date of Admission: 8/29/2023  Date of Service: 08/30/23    Primary Care Physician: Lurdes Estrada MD    Chief Complaint: MV coronary artery disease      Subjective      No acute events overnight. No chest pain or shortness of breath. Has ambulated without difficulty. Requesting to see [facility] rather than [facility].       Objective   Vitals: /64 (BP Location: Left arm, Patient Position: Lying)   Pulse 66   Temp 98.1 °F (36.7 °C) (Oral)   Resp 16   Ht 188 cm (74\")   Wt 78 kg (172 lb)   SpO2 98%   BMI 22.08 kg/m²     Physical Exam:  GENERAL: Alert, cooperative, in no acute distress.   HEENT: Normocephalic, no jugular venous distention  HEART: Regular rhythm, normal rate, and no murmurs, gallops, or rubs.   LUNGS: Clear to auscultation bilaterally. No wheezing, rales or rhonchi.  EXTREMITIES: No clubbing, cyanosis, or edema noted. Radial access site CDI.     Results:  Results from last 7 days   Lab Units 08/30/23  0425 08/29/23  0201 08/27/23  0419   WBC 10*3/mm3 6.56 8.55 10.64   HEMOGLOBIN g/dL 15.2 15.0 15.4   HEMATOCRIT % 45.1 45.3 45.8   PLATELETS 10*3/mm3 161 159 193     Results from last 7 days   Lab Units 08/30/23  0425 08/29/23  0201 08/28/23  0236   SODIUM mmol/L 136 134* " 137   POTASSIUM mmol/L 3.9 4.1 3.8   CHLORIDE mmol/L 99 100 102   CO2 mmol/L 28.0 21.6* 23.8   BUN mg/dL 20 19 17   CREATININE mg/dL 0.91 0.72* 0.85   GLUCOSE mg/dL 122* 233* 162*      Lab Results   Component Value Date    CHOL 141 08/30/2023    TRIG 130 08/30/2023    HDL 40 08/30/2023    LDL 78 08/30/2023    AST 28 08/30/2023    ALT 12 08/30/2023     Results from last 7 days   Lab Units 08/26/23  0841   HEMOGLOBIN A1C % 10.20*     Results from last 7 days   Lab Units 08/30/23  0425   CHOLESTEROL mg/dL 141   TRIGLYCERIDES mg/dL 130   HDL CHOL mg/dL 40   LDL CHOL mg/dL 78     Results from last 7 days   Lab Units 08/26/23  1045   TSH uIU/mL 1.150         Results from last 7 days   Lab Units 08/29/23  0525 08/26/23  1235   PROTIME Seconds 13.4 12.7   INR  0.97 0.91   APTT seconds 30.4 29.2     Results from last 7 days   Lab Units 08/29/23  0410 08/29/23  0201 08/27/23  0419   HSTROP T ng/L 1,945* 1,745* 1,483*     Results from last 7 days   Lab Units 08/26/23  1045   PROBNP pg/mL 549.1         Intake/Output Summary (Last 24 hours) at 8/30/2023 0844  Last data filed at 8/30/2023 0748  Gross per 24 hour   Intake --   Output 400 ml   Net -400 ml       I personally reviewed the patient's EKG/Telemetry data    Radiology Data:  Results for orders placed during the hospital encounter of 08/26/23    Adult Transthoracic Echo Complete W/ Cont if Necessary Per Protocol    Interpretation Summary    Left ventricular systolic function is normal. Left ventricular ejection fraction appears to be 51 - 55%.    Moderate mitral valve regurgitation is present.    Estimated right ventricular systolic pressure from tricuspid regurgitation is normal (<35 mmHg).    There is no evidence of pericardial effusion    ECG reviewed today: sinus with PAC, RBBB, no acute ST-T changes.     Current Medications:  aspirin, 81 mg, Oral, Daily  clopidogrel, 75 mg, Oral, Daily  donepezil, 5 mg, Oral, Nightly  insulin detemir, 10 Units, Subcutaneous,  Nightly  insulin lispro, 2-9 Units, Subcutaneous, 4x Daily AC & at Bedtime  losartan, 25 mg, Oral, Daily  metoprolol tartrate, 12.5 mg, Oral, Q12H  rosuvastatin, 40 mg, Oral, Nightly  senna-docusate sodium, 2 tablet, Oral, BID  sodium chloride, 10 mL, Intravenous, Q12H           Assessment  Coronary artery disease  History of remote CABG x4  NSTEMI with LHC at [facility] 7/28/2023 with multivessel disease and felt to need high risk PCI versus CABG.  Transfer to [facility] with Dr. 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  Severe disease in RCA with widely patent radial graft to the right PDA, widely patent LIMA to LAD at outside hospital  Hypertension  Dyslipidemia  Diabetes, A1c 10.2  Peripheral arterial disease  Arterial duplex and SJL 7/2023 with long segment SFA occlusion, started on Xarelto and will follow up with vascular as OP.   Of note, Xarelto not listed on home meds list.  Medical noncompliance    Plan  Continue aspirin and plavix for DAPT for at least 1 year.   Continue statin for CAD/Dyslipidemia  Continue metoprolol and losartan for GDMT.   Patient is requesting appointment with our CTS team for his PAD. Will place referral today to be done as an outpatient.   Begin Xarelto 2.5mg BID for PAD as previously prescribed.   Cardiac rehab referral placed today.   Follow up with Dr. Trevino as an outpatient in 4-6 weeks.     Petrona Davis PA-C      Addendum: nurse called and stated that patient has been weak and stumbled backwards. Would like to be seen by PT prior to discharge. Will keep overnight to allow PT to see and give recs.         Electronically signed by Petrona Davis PA-C at 08/30/23 7003       Petrona Davis PA-C at 08/31/23  "1001            [facility] at [facility]  PROGRESS NOTE    Date of Admission: 8/29/2023  Date of Service: 08/31/23    Primary Care Physician: Lurdes Estrada MD    Chief Complaint: MV coronary artery disease      Subjective      Scheduled to be discharged yesterday. However, patient felt weak and felt like he needed to work with PT first. PT was consulted and he will be seen by them today. He tolerated PT well and would like to go home.      Objective   Vitals: /66 (BP Location: Left arm, Patient Position: Lying)   Pulse 71   Temp 97.9 °F (36.6 °C) (Axillary)   Resp 18   Ht 188 cm (74\")   Wt 78 kg (172 lb)   SpO2 95%   BMI 22.08 kg/m²     Physical Exam:  GENERAL: Alert, cooperative, in no acute distress.   HEENT: Normocephalic, no jugular venous distention  HEART: Regular rhythm, normal rate, and no murmurs, gallops, or rubs.   LUNGS: Clear to auscultation bilaterally. No wheezing, rales or rhonchi.  EXTREMITIES: No clubbing, cyanosis, or edema noted. Radial access site CDI.     Results:  Results from last 7 days   Lab Units 08/30/23  0425 08/29/23  0201 08/27/23  0419   WBC 10*3/mm3 6.56 8.55 10.64   HEMOGLOBIN g/dL 15.2 15.0 15.4   HEMATOCRIT % 45.1 45.3 45.8   PLATELETS 10*3/mm3 161 159 193       Results from last 7 days   Lab Units 08/30/23  0425 08/29/23  0201 08/28/23  0236   SODIUM mmol/L 136 134* 137   POTASSIUM mmol/L 3.9 4.1 3.8   CHLORIDE mmol/L 99 100 102   CO2 mmol/L 28.0 21.6* 23.8   BUN mg/dL 20 19 17   CREATININE mg/dL 0.91 0.72* 0.85   GLUCOSE mg/dL 122* 233* 162*        Lab Results   Component Value Date    CHOL 141 08/30/2023    TRIG 130 08/30/2023    HDL 40 08/30/2023    LDL 78 08/30/2023    AST 28 08/30/2023    ALT 12 08/30/2023     Results from last 7 days   Lab Units 08/26/23  0841   HEMOGLOBIN A1C % 10.20*       Results from last 7 days   Lab Units 08/30/23  0425   CHOLESTEROL mg/dL 141   TRIGLYCERIDES mg/dL 130   HDL CHOL mg/dL 40   LDL CHOL mg/dL 78 "       Results from last 7 days   Lab Units 08/26/23  1045   TSH uIU/mL 1.150           Results from last 7 days   Lab Units 08/29/23  0525 08/26/23  1235   PROTIME Seconds 13.4 12.7   INR  0.97 0.91   APTT seconds 30.4 29.2       Results from last 7 days   Lab Units 08/29/23  0410 08/29/23  0201 08/27/23  0419   HSTROP T ng/L 1,945* 1,745* 1,483*       Results from last 7 days   Lab Units 08/26/23  1045   PROBNP pg/mL 549.1         No intake or output data in the 24 hours ending 08/31/23 1001      I personally reviewed the patient's EKG/Telemetry data    Radiology Data:  Results for orders placed during the hospital encounter of 08/26/23    Adult Transthoracic Echo Complete W/ Cont if Necessary Per Protocol    Interpretation Summary    Left ventricular systolic function is normal. Left ventricular ejection fraction appears to be 51 - 55%.    Moderate mitral valve regurgitation is present.    Estimated right ventricular systolic pressure from tricuspid regurgitation is normal (<35 mmHg).    There is no evidence of pericardial effusion    ECG reviewed today: sinus with PAC, RBBB, no acute ST-T changes.     Current Medications:  aspirin, 81 mg, Oral, Daily  clopidogrel, 75 mg, Oral, Daily  donepezil, 5 mg, Oral, Nightly  insulin detemir, 10 Units, Subcutaneous, Nightly  insulin lispro, 2-9 Units, Subcutaneous, 4x Daily AC & at Bedtime  losartan, 25 mg, Oral, Daily  metoprolol tartrate, 12.5 mg, Oral, Q12H  pharmacy consult - [place], , Does not apply, Daily  rosuvastatin, 40 mg, Oral, Nightly  senna-docusate sodium, 2 tablet, Oral, BID  sodium chloride, 10 mL, Intravenous, Q12H           Assessment:   Coronary artery disease  History of remote CABG x4  NSTEMI with [facility] 7/28/2023 with multivessel disease and felt to need high risk PCI versus CABG.  Transfer to [facility] with Dr. 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  Severe disease in RCA with widely patent radial graft to the right PDA, widely patent LIMA to LAD at outside hospital  Hypertension  Dyslipidemia  Diabetes, A1c 10.2  Peripheral arterial disease  Arterial duplex and SJL 7/2023 with long segment SFA occlusion, started on Xarelto and will follow up with vascular as OP.   Of note, Xarelto not listed on home meds list.  Medical noncompliance    Plan:   Continue aspirin and plavix for DAPT for at least 1 year.   Continue statin for CAD/Dyslipidemia  Continue metoprolol and losartan for GDMT.   Patient is requesting appointment with our CTS team for his PAD. Will place referral today to be done as an outpatient.   Begin Xarelto 2.5mg BID for PAD as previously prescribed.   Cardiac rehab referral placed today. To be done in [place].    Follow up with Dr. Trevino as an outpatient in 4-6 weeks.       Petrona Davis PA-C            Electronically signed by Petrona Davis PA-C at 08/31/23 [id number]

## 2023-08-31 NOTE — DISCHARGE SUMMARY
"  [facility] at [facility]  PROGRESS NOTE    Date of Admission: 8/29/2023  Date of Service: 08/31/23    Primary Care Physician: Lurdes Estrada MD    Chief Complaint: MV coronary artery disease      Subjective      Scheduled to be discharged yesterday. However, patient felt weak and felt like he needed to work with PT first. PT was consulted and he will be seen by them today. He tolerated PT well and would like to go home.      Objective   Vitals: /66 (BP Location: Left arm, Patient Position: Lying)   Pulse 71   Temp 97.9 °F (36.6 °C) (Axillary)   Resp 18   Ht 188 cm (74\")   Wt 78 kg (172 lb)   SpO2 95%   BMI 22.08 kg/m²     Physical Exam:  GENERAL: Alert, cooperative, in no acute distress.   HEENT: Normocephalic, no jugular venous distention  HEART: Regular rhythm, normal rate, and no murmurs, gallops, or rubs.   LUNGS: Clear to auscultation bilaterally. No wheezing, rales or rhonchi.  EXTREMITIES: No clubbing, cyanosis, or edema noted. Radial access site CDI.     Results:  Results from last 7 days   Lab Units 08/30/23  0425 08/29/23  0201 08/27/23  0419   WBC 10*3/mm3 6.56 8.55 10.64   HEMOGLOBIN g/dL 15.2 15.0 15.4   HEMATOCRIT % 45.1 45.3 45.8   PLATELETS 10*3/mm3 161 159 193       Results from last 7 days   Lab Units 08/30/23  0425 08/29/23  0201 08/28/23  0236   SODIUM mmol/L 136 134* 137   POTASSIUM mmol/L 3.9 4.1 3.8   CHLORIDE mmol/L 99 100 102   CO2 mmol/L 28.0 21.6* 23.8   BUN mg/dL 20 19 17   CREATININE mg/dL 0.91 0.72* 0.85   GLUCOSE mg/dL 122* 233* 162*        Lab Results   Component Value Date    CHOL 141 08/30/2023    TRIG 130 08/30/2023    HDL 40 08/30/2023    LDL 78 08/30/2023    AST 28 08/30/2023    ALT 12 08/30/2023     Results from last 7 days   Lab Units 08/26/23  0841   HEMOGLOBIN A1C % 10.20*       Results from last 7 days   Lab Units 08/30/23  0425   CHOLESTEROL mg/dL 141   TRIGLYCERIDES mg/dL 130   HDL CHOL mg/dL 40   LDL CHOL mg/dL 78       Results " from last 7 days   Lab Units 08/26/23  1045   TSH uIU/mL 1.150           Results from last 7 days   Lab Units 08/29/23  0525 08/26/23  1235   PROTIME Seconds 13.4 12.7   INR  0.97 0.91   APTT seconds 30.4 29.2       Results from last 7 days   Lab Units 08/29/23  0410 08/29/23  0201 08/27/23  0419   HSTROP T ng/L 1,945* 1,745* 1,483*       Results from last 7 days   Lab Units 08/26/23  1045   PROBNP pg/mL 549.1         No intake or output data in the 24 hours ending 08/31/23 1001      I personally reviewed the patient's EKG/Telemetry data    Radiology Data:  Results for orders placed during the hospital encounter of 08/26/23    Adult Transthoracic Echo Complete W/ Cont if Necessary Per Protocol    Interpretation Summary    Left ventricular systolic function is normal. Left ventricular ejection fraction appears to be 51 - 55%.    Moderate mitral valve regurgitation is present.    Estimated right ventricular systolic pressure from tricuspid regurgitation is normal (<35 mmHg).    There is no evidence of pericardial effusion    ECG reviewed today: sinus with PAC, RBBB, no acute ST-T changes.     Current Medications:  aspirin, 81 mg, Oral, Daily  clopidogrel, 75 mg, Oral, Daily  donepezil, 5 mg, Oral, Nightly  insulin detemir, 10 Units, Subcutaneous, Nightly  insulin lispro, 2-9 Units, Subcutaneous, 4x Daily AC & at Bedtime  losartan, 25 mg, Oral, Daily  metoprolol tartrate, 12.5 mg, Oral, Q12H  pharmacy consult - [place], , Does not apply, Daily  rosuvastatin, 40 mg, Oral, Nightly  senna-docusate sodium, 2 tablet, Oral, BID  sodium chloride, 10 mL, Intravenous, Q12H           Assessment:   Coronary artery disease  History of remote CABG x4  NSTEMI with [facility] 7/28/2023 with multivessel disease and felt to need high risk PCI versus CABG.  Transfer to [facility] with Dr. 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  Severe disease in RCA with widely patent radial graft to the right PDA, widely patent LIMA to LAD at outside hospital  Hypertension  Dyslipidemia  Diabetes, A1c 10.2  Peripheral arterial disease  Arterial duplex and SJL 7/2023 with long segment SFA occlusion, started on Xarelto and will follow up with vascular as OP.   Of note, Xarelto not listed on home meds list.  Medical noncompliance    Plan:   Continue aspirin and plavix for DAPT for at least 1 year.   Continue statin for CAD/Dyslipidemia  Continue metoprolol and losartan for GDMT.   Patient is requesting appointment with our CTS team for his PAD. Will place referral today to be done as an outpatient.   Begin Xarelto 2.5mg BID for PAD as previously prescribed.   Cardiac rehab referral placed today. To be done in [place].    Follow up with Dr. Trevino as an outpatient in 4-6 weeks.       Petrona Davis PA-C

## 2023-08-31 NOTE — THERAPY EVALUATION
Patient Name: Giorgio Omer  : 1950    MRN: [id number]                              Today's Date: 2023       Admit Date: 2023    Visit Dx:     ICD-10-CM ICD-9-CM   1. NSTEMI (non-ST elevated myocardial infarction)  I21.4 410.70   2. ACS (acute coronary syndrome)  I24.9 411.1   3. PAD (peripheral artery disease)  I73.9 443.9     Patient Active Problem List   Diagnosis    Coronary artery disease    Poorly controlled type 2 diabetes mellitus    Dyslipidemia    Hypertension    Right bundle branch block    Peyronie disease    NSTEMI (non-ST elevated myocardial infarction)    Phimosis    Aftercare for circumcision    Erectile dysfunction due to arterial insufficiency    Hypertensive emergency    ACS (acute coronary syndrome)    Type 1 myocardial infarction    Peripheral artery disease     Past Medical History:   Diagnosis Date    Arthritis     Coronary artery disease 10/17/2016    Surgical revascularization, Dr. Bryant, , [facility], receiving LIMA to LAD, free radial graft to PDA, saphenous graft to first diagonal, saphenous graft to obtuse marginal. MPS, May 2008, per [facility]: Normal perfusion, EF 68%.  2012 - exercise Cardiolite WNL, EF preserved.    Diabetes mellitus 10/17/2016    on Metformin times one year, onset :  2015.  HG A1c 8.6.     Dyslipidemia 10/17/2016    2015:  Total cholesterol 143, triglycerides 326, HDL 39, LDL 38.    Hyperlipidemia     Hypertension 10/17/2016    presumed essential.     Right bundle branch block 10/17/2016    baseline     Past Surgical History:   Procedure Laterality Date    CARDIAC CATHETERIZATION N/A 2022    Procedure: LEFT HEART CATH;  Surgeon: Sriram Bowers MD;  Location:  [facility];  Service: Cardiovascular;  Laterality: N/A;    CARDIAC CATHETERIZATION N/A 2023    Procedure: Coronary angiography;  Surgeon: Fabiano Duran MD;  Location:  [facility];  Service: Cardiology;  Laterality: N/A;     CARDIAC CATHETERIZATION N/A 8/29/2023    Procedure: Percutaneous Coronary Intervention;  Surgeon: Sriram Bowers MD;  Location:  [facility];  Service: Cardiovascular;  Laterality: N/A;    CIRCUMCISION N/A 2/27/2023    Procedure: CIRCUMCISION;  Surgeon: Himanshu Espinoza MD;  Location:  [facility];  Service: Urology;  Laterality: N/A;    COLONOSCOPY      CORONARY ARTERY BYPASS GRAFT  1991      General Information       Row Name 08/31/23 0932          Physical Therapy Time and Intention    Document Type evaluation  -NS     Mode of Treatment individual therapy;physical therapy  -NS       Row Name 08/31/23 0932          General Information    Patient Profile Reviewed yes  -NS     Prior Level of Function independent:;all household mobility;community mobility;gait;transfer;bed mobility;ADL's;using stairs;driving  -NS     Existing Precautions/Restrictions fall  -NS     Barriers to Rehab medically complex  -NS       Row Name 08/31/23 0932          Living Environment    People in Home alone  -NS       Row Name 08/31/23 0932          Home Main Entrance    Number of Stairs, Main Entrance twelve  pt has 6 steps with no HR, then a stoop, then 6 more steps with a R HR  -NS     Stair Railings, Main Entrance railing on right side (ascending)  -NS       Row Name 08/31/23 0932          Stairs Within Home, Primary    Number of Stairs, Within Home, Primary six  -NS     Stair Railings, Within Home, Primary railing on right side (ascending)  -NS     Stairs Comment, Within Home, Primary split level home- 6 steps up and 6 steps down.  -NS       Row Name 08/31/23 0932          Cognition    Orientation Status (Cognition) oriented to;person;place;disoriented to;time  needed cues for month and year  -NS       Row Name 08/31/23 0932          Safety Issues, Functional Mobility    Safety Issues Affecting Function (Mobility) safety precaution awareness;safety precautions follow-through/compliance;sequencing  abilities;insight into deficits/self-awareness  -NS     Impairments Affecting Function (Mobility) balance;coordination;strength;endurance/activity tolerance;pain  -NS               User Key  (r) = Recorded By, (t) = Taken By, (c) = Cosigned By      Initials Name Provider Type    Anna Gleason, PT Physical Therapist                   Mobility       Row Name 08/31/23 0932          Bed Mobility    Bed Mobility supine-sit  -NS     Supine-Sit [place] (Bed Mobility) standby assist  -NS     Assistive Device (Bed Mobility) head of bed elevated  -NS     Comment, (Bed Mobility) Pt completed without difficulty  -NS       Row Name 08/31/23 0932          Sit-Stand Transfer    Sit-Stand [place] (Transfers) contact guard  -NS       Row Name 08/31/23 0932          Gait/Stairs (Locomotion)    [place] Level (Gait) contact guard  -NS     Assistive Device (Gait) cane, straight  -NS     Distance in Feet (Gait) 200+140  -NS     Deviations/Abnormal Patterns (Gait) base of support, narrow;joey decreased;gait speed decreased  -NS     Bilateral Gait Deviations heel strike decreased  -NS     Comment, (Gait/Stairs) Patient ambulated without AD with CGA for 200ft, demonstrating mild unsteadiness and narrow ADARSH. He trialed ambulating 140ft with SPC, continuing to require CGA but pt reporting feeling more stable. 1 mild unsteady moment requiring Min A to correct.  -NS               User Key  (r) = Recorded By, (t) = Taken By, (c) = Cosigned By      Initials Name Provider Type    Anna Gleason, PT Physical Therapist                   Obj/Interventions       Row Name 08/31/23 0932          Range of Motion Comprehensive    General Range of Motion bilateral lower extremity ROM WFL  -NS       Row Name 08/31/23 0932          Strength Comprehensive (MMT)    General Manual Muscle Testing (MMT) Assessment lower extremity strength deficits identified  -NS     Comment, General Manual Muscle Testing (MMT) Assessment BLEs: grossly 4+/5   -NS       Row Name 08/31/23 0932          Balance    Balance Assessment sitting static balance;sitting dynamic balance;standing static balance;standing dynamic balance  -NS     Static Sitting Balance independent  -NS     Dynamic Sitting Balance standby assist  -NS     Position, Sitting Balance unsupported;sitting edge of bed  -NS     Static Standing Balance contact guard  -NS     Dynamic Standing Balance contact guard  -NS     Position/Device Used, Standing Balance supported;unsupported;cane, straight  CGA for supported and unsupported  -NS       Row Name 08/31/23 0932          Sensory Assessment (Somatosensory)    Sensory Assessment (Somatosensory) LE sensation intact  -NS               User Key  (r) = Recorded By, (t) = Taken By, (c) = Cosigned By      Initials Name Provider Type    Anna Gleason, PT Physical Therapist                   Goals/Plan       Row Name 08/31/23 0932          Bed Mobility Goal 1 (PT)    Activity/Assistive Device (Bed Mobility Goal 1, PT) sit to supine/supine to sit  -NS     [place] Level/Cues Needed (Bed Mobility Goal 1, PT) independent  -NS     Time Frame (Bed Mobility Goal 1, PT) long term goal (LTG);2 weeks  -[facility] Name 08/31/23 0932          Transfer Goal 1 (PT)    Activity/Assistive Device (Transfer Goal 1, PT) sit-to-stand/stand-to-sit;bed-to-chair/chair-to-bed  -NS     [place] Level/Cues Needed (Transfer Goal 1, PT) independent  -NS     Time Frame (Transfer Goal 1, PT) long term goal (LTG);2 weeks  -[facility] Name 08/31/23 0932          Gait Training Goal 1 (PT)    Activity/Assistive Device (Gait Training Goal 1, PT) gait (walking locomotion);assistive device use  -NS     [place] Level (Gait Training Goal 1, PT) modified independence  -NS     Distance (Gait Training Goal 1, PT) 400  -NS     Time Frame (Gait Training Goal 1, PT) long term goal (LTG);2 weeks  -[facility] Name 08/31/23 0932          Stairs Goal 1 (PT)    Activity/Assistive Device (Stairs  Goal 1, PT) ascending stairs;descending stairs  -NS     [place] Level/Cues Needed (Stairs Goal 1, PT) standby assist  -NS     Number of Stairs (Stairs Goal 1, PT) 12  -NS     Time Frame (Stairs Goal 1, PT) long term goal (LTG);2 weeks  -NS       Row Name 08/31/23 0932          Therapy Assessment/Plan (PT)    Planned Therapy Interventions (PT) balance training;bed mobility training;gait training;home exercise program;neuromuscular re-education;stair training;strengthening;patient/family education;transfer training  -NS               User Key  (r) = Recorded By, (t) = Taken By, (c) = Cosigned By      Initials Name Provider Type    Anna Gleason, PT Physical Therapist                   Clinical Impression       Row Name 08/31/23 0932          Pain    Pretreatment Pain Rating 0/10 - no pain  -NS     Posttreatment Pain Rating 0/10 - no pain  -NS     Pre/Posttreatment Pain Comment Pt reports that he has chronic R hip pain that sometimes hurts with walking but denied pain during PT eval.  -NS       Row Name 08/31/23 0932          Plan of Care Review    Plan of Care Reviewed With patient;son  -NS     Progress no change  -NS     Outcome Evaluation Patient presents with weakness, decreased endurance, and balance impairments affecting functional mobility. He ambulated in hallway without AD and then with SPC, with pt requiring CGA for both episodes of gait but reporting feeling more steady with SPC. Discussed with pt and son the option of pt returning to family member's home until strength improves as pt has several sets of stairs at his own home- pt and son agreeable. Recommend home with family assist and HHPT at d/c.  -NS       Row Name 08/31/23 0932          Therapy Assessment/Plan (PT)    Patient/Family Therapy Goals Statement (PT) return to PLOF  -NS     Rehab Potential (PT) good, to achieve stated therapy goals  -NS     Criteria for Skilled Interventions Met (PT) yes;meets criteria;skilled treatment is necessary   -NS     Therapy Frequency (PT) daily  -NS       Row Name 08/31/23 0932          Vital Signs    Pre Systolic BP Rehab 115  -NS     Pre Treatment Diastolic BP 66  -NS     Post Systolic BP Rehab 125  -NS     Post Treatment Diastolic BP 65  -NS     Pretreatment Heart Rate (beats/min) 77  -NS     Posttreatment Heart Rate (beats/min) 75  -NS     O2 Delivery Pre Treatment room air  -NS     O2 Delivery Intra Treatment room air  -NS     O2 Delivery Post Treatment room air  -NS     Pre Patient Position Supine  -NS     Intra Patient Position Standing  -NS     Post Patient Position Sitting  -NS       Row Name 08/31/23 0932          Positioning and Restraints    Pre-Treatment Position in bed  -NS     Post Treatment Position chair  -NS     In Chair notified nsg;reclined;call light within reach;encouraged to call for assist;exit alarm on;legs elevated;waffle cushion;with family/caregiver  -NS               User Key  (r) = Recorded By, (t) = Taken By, (c) = Cosigned By      Initials Name Provider Type    Anna Gleason, PT Physical Therapist                   Outcome Measures       Row Name 08/31/23 0932          How much help from another person do you currently need...    Turning from your back to your side while in flat bed without using bedrails? 4  -NS     Moving from lying on back to sitting on the side of a flat bed without bedrails? 3  -NS     Moving to and from a bed to a chair (including a wheelchair)? 3  -NS     Standing up from a chair using your arms (e.g., wheelchair, bedside chair)? 3  -NS     Climbing 3-5 steps with a railing? 2  -NS     To walk in hospital room? 3  -NS     AM-PAC 6 Clicks Score (PT) 18  -NS     Highest level of mobility 6 --> Walked 10 steps or more  -NS       Row Name 08/31/23 0932          Functional Assessment    Outcome Measure Options AM-PAC 6 Clicks Basic Mobility (PT)  -NS               User Key  (r) = Recorded By, (t) = Taken By, (c) = Cosigned By      Initials Name Provider Type    NS  Anna Domínguez, PT Physical Therapist                                 Physical Therapy Education       Title: PT OT SLP Therapies (In Progress)       Topic: Physical Therapy (In Progress)       Point: Mobility training (Done)       Learning Progress Summary             Patient Acceptance, E, VU by NS at 8/31/2023 1120    Comment: purpose of IP PT, benefits and technique for using SPC for ambulation, safe discharge planning, purpose of HHPT for continued therapy and addressing stairs   Family Acceptance, E, VU by NS at 8/31/2023 1120    Comment: purpose of IP PT, benefits and technique for using SPC for ambulation, safe discharge planning, purpose of HHPT for continued therapy and addressing stairs                         Point: Home exercise program (Not Started)       Learner Progress:  Not documented in this visit.              Point: Body mechanics (Done)       Learning Progress Summary             Patient Acceptance, E, VU by NS at 8/31/2023 1120    Comment: purpose of IP PT, benefits and technique for using SPC for ambulation, safe discharge planning, purpose of HHPT for continued therapy and addressing stairs   Family Acceptance, E, VU by NS at 8/31/2023 1120    Comment: purpose of IP PT, benefits and technique for using SPC for ambulation, safe discharge planning, purpose of HHPT for continued therapy and addressing stairs                         Point: Precautions (Done)       Learning Progress Summary             Patient Acceptance, E, VU by NS at 8/31/2023 1120    Comment: purpose of IP PT, benefits and technique for using SPC for ambulation, safe discharge planning, purpose of HHPT for continued therapy and addressing stairs   Family Acceptance, E, VU by NS at 8/31/2023 1120    Comment: purpose of IP PT, benefits and technique for using SPC for ambulation, safe discharge planning, purpose of HHPT for continued therapy and addressing stairs                                         User Key       Initials  Effective Dates Name Provider Type Discipline    NS 06/16/21 -  Anna Domínguez PT Physical Therapist PT                  PT Recommendation and Plan  Planned Therapy Interventions (PT): balance training, bed mobility training, gait training, home exercise program, neuromuscular re-education, stair training, strengthening, patient/family education, transfer training  Plan of Care Reviewed With: patient, son  Progress: no change  Outcome Evaluation: Patient presents with weakness, decreased endurance, and balance impairments affecting functional mobility. He ambulated in hallway without AD and then with SPC, with pt requiring CGA for both episodes of gait but reporting feeling more steady with SPC. Discussed with pt and son the option of pt returning to family member's home until strength improves as pt has several sets of stairs at his own home- pt and son agreeable. Recommend home with family assist and HHPT at d/c.     Time Calculation:   PT Evaluation Complexity  History, PT Evaluation Complexity: 3 or more personal factors and/or comorbidities  Examination of Body Systems (PT Eval Complexity): total of 4 or more elements  Clinical Presentation (PT Evaluation Complexity): evolving  Clinical Decision Making (PT Evaluation Complexity): moderate complexity  Overall Complexity (PT Evaluation Complexity): moderate complexity     PT Charges       Row Name 08/31/23 0932             Time Calculation    Start Time 0932  -NS      PT Received On 08/31/23  -NS      PT Goal Re-Cert Due Date 09/10/23  -NS         Timed Charges    49215 - Gait Training Minutes  23  -NS         Untimed Charges    PT Eval/Re-eval Minutes 47  -NS         Total Minutes    Timed Charges Total Minutes 23  -NS      Untimed Charges Total Minutes 47  -NS       Total Minutes 70  -NS                User Key  (r) = Recorded By, (t) = Taken By, (c) = Cosigned By      Initials Name Provider Type    Anna Gleason PT Physical Therapist                   Therapy Charges for Today       Code Description Service Date Service Provider Modifiers Qty    52060906948 HC GAIT TRAINING EA 15 MIN 8/31/2023 Anna Domínguez, PT GP 2    19717331606 HC PT EVAL MOD COMPLEXITY 4 8/31/2023 Anna Domínguez, PT GP 1            PT G-Codes  Outcome Measure Options: AM-PAC 6 Clicks Basic Mobility (PT)  AM-PAC 6 Clicks Score (PT): 18  PT Discharge Summary  Anticipated Discharge Disposition (PT): home with assist, home with home health    Anna Domínguez PT  8/31/2023

## 2023-08-31 NOTE — PLAN OF CARE
Goal Outcome Evaluation:  Plan of Care Reviewed With: patient           Outcome Evaluation: VSS, RA, NSR on tele. will continue to monitor at this time

## 2023-08-31 NOTE — CASE MANAGEMENT/SOCIAL WORK
Continued Stay Note  [facility]     Patient Name: Giorgio Omer  MRN: [id number]  Today's Date: 8/31/2023    Admit Date: 8/29/2023    Plan: discharge plan   Discharge Plan       Row Name 08/31/23 6554       Plan    Plan discharge plan    Plan Comments PT is recommending HH arranged at discharge. I spoke with son, Oscar and he is agreeable to HH as pt(his dad) will be staying with he and his wife a few days. Oscar has no preference to a particular HH agency as there are only 2 that is in [facility]. HH referral made to [facility] for skilled nursing, PT and OT with son's address added: [address] [facility] [place] [address]. CM will cont to follow and wait to hear back from [facility].    Final Discharge Disposition Code 06 - home with home health care                   Discharge Codes    No documentation.                 Expected Discharge Date and Time       Expected Discharge Date Expected Discharge Time    Aug 31, 2023               Beth Marvin RN

## 2023-08-31 NOTE — PLAN OF CARE
Goal Outcome Evaluation:  Plan of Care Reviewed With: patient, son        Progress: no change  Outcome Evaluation: Patient presents with weakness, decreased endurance, and balance impairments affecting functional mobility. He ambulated in hallway without AD and then with SPC, with pt requiring CGA for both episodes of gait but reporting feeling more steady with SPC. Discussed with pt and son the option of pt returning to family member's home until strength improves as pt has several sets of stairs at his own home- pt and son agreeable. Recommend home with family assist and HHPT at d/c.      Anticipated Discharge Disposition (PT): home with assist, home with home health

## 2023-08-31 NOTE — CASE MANAGEMENT/SOCIAL WORK
Case Management Discharge Note      Final Note: Pt is discharged today and plan is home with son Oscar Omer at [address]. [facility] has accepted referral for skilled nursing, PT and OT. [facility]  is aware pt is being discharged today and will be in contact with pt/son to arrange HH visits  [facility] provided with son's address and phone number. Son Oscar will transport pt home and both pt/son are agreeable to discharage plan.         Selected Continued Care - Admitted Since 8/29/2023       Destination    No services have been selected for the patient.                Durable Medical Equipment    No services have been selected for the patient.                Dialysis/Infusion    No services have been selected for the patient.                Home Medical Care       Service Provider Selected Services Address Phone Fax Patient Preferred    [facility] [address] [place] [address] [phone] --              Therapy    No services have been selected for the patient.                Community Resources    No services have been selected for the patient.                Community & DME    No services have been selected for the patient.                         Final Discharge Disposition Code: 06 - home with home health care

## 2023-08-31 NOTE — PROGRESS NOTES
[facility] Medicine Services  PROGRESS NOTE    Patient Name: Giorgio Omer  : 1950  MRN: [id number]    Date of Admission: 2023  Primary Care Physician: Lurdes Estrada MD    Subjective   Subjective     CC:  F/U diabetes    HPI:  Patient seen this morning, waiting for PT to see him. Son at bedside. [facility] [place]     ROS:  Gen-no fevers, no chills  CV-no chest pain, no palpitations  Resp-no cough, no dyspnea  GI-no N/V/D, no abd pain       Objective   Objective     Vital Signs:   Temp:  [97.9 °F (36.6 °C)-99.2 °F (37.3 °C)] 97.9 °F (36.6 °C)  Heart Rate:  [66-84] 71  Resp:  [16-18] 18  BP: ()/(51-90) 115/66     Physical Exam:  Gen-no acute distress  HENT-NCAT, mucous membranes moist  CV-RRR, S1 S2 normal, no m/r/g  Resp-CTAB, no wheezes or rales  Abd-soft, NT, ND, +BS  Ext-no edema  Neuro-A&Ox3, no focal deficits  Skin-no rashes  Psych-appropriate mood      Results Reviewed:  LAB RESULTS:      Lab 23  0425 23  1414 23  0525 23  0201 23  1031 23  0236 23  1935 23  1103 23  0419 23  1834 23  1235 23  0841   WBC 6.56  --   --  8.55  --   --   --   --  10.64  --   --  8.98   HEMOGLOBIN 15.2  --   --  15.0  --   --   --   --  15.4  --   --  16.0   HEMATOCRIT 45.1  --   --  45.3  --   --   --   --  45.8  --   --  47.3   PLATELETS 161  --   --  159  --   --   --   --  193  --   --  186   NEUTROS ABS  --   --   --   --   --   --   --   --   --   --   --  6.11   IMMATURE GRANS (ABS)  --   --   --   --   --   --   --   --   --   --   --  0.02   LYMPHS ABS  --   --   --   --   --   --   --   --   --   --   --  1.96   MONOS ABS  --   --   --   --   --   --   --   --   --   --   --  0.47   EOS ABS  --   --   --   --   --   --   --   --   --   --   --  0.31   MCV 88.3  --   --  90.2  --   --   --   --  90.0  --   --  89.2   PROTIME  --   --  13.4  --   --   --   --   --    --   --  12.7  --    APTT  --   --  30.4  --   --   --   --   --   --   --  29.2  --    HEPARIN ANTI-XA  --  0.10* <0.10*  --  0.29* 0.16* 0.23*   < > 0.20*   < > <0.10*  --     < > = values in this interval not displayed.         Lab 08/30/23  0425 08/29/23 0201 08/28/23  0236 08/27/23 0419 08/26/23  1045 08/26/23  0841   SODIUM 136 134* 137 135*  --  137   POTASSIUM 3.9 4.1 3.8 4.2  --  3.6   CHLORIDE 99 100 102 102  --  100   CO2 28.0 21.6* 23.8 21.7*  --  24.5   ANION GAP 9.0 12.4 11.2 11.3  --  12.5   BUN 20 19 17 16  --  12   CREATININE 0.91 0.72* 0.85 0.87  --  0.94   EGFR 89.0 96.5 91.8 91.1  --  85.6   GLUCOSE 122* 233* 162* 233*  --  334*   CALCIUM 9.0 8.8 8.8 9.1  --  9.1   MAGNESIUM  --   --   --  2.2  --   --    HEMOGLOBIN A1C  --   --   --   --   --  10.20*   TSH  --   --   --   --  1.150  --          Lab 08/30/23 0425 08/27/23 0419 08/26/23  0841   TOTAL PROTEIN 6.4 6.6 7.0   ALBUMIN 3.5 3.8 4.1   GLOBULIN 2.9 2.8 2.9   ALT (SGPT) 12 18 8   AST (SGOT) 28 117* 14   BILIRUBIN 1.3* 1.4* 1.2   ALK PHOS 74 74 77         Lab 08/29/23  0525 08/29/23 0410 08/29/23 0201 08/27/23 0419 08/26/23  1235 08/26/23  1045 08/26/23  0841   PROBNP  --   --   --   --   --  549.1  --    HSTROP T  --  1,945* 1,745* 1,483*  --  57* 20*   PROTIME 13.4  --   --   --  12.7  --   --    INR 0.97  --   --   --  0.91  --   --          Lab 08/30/23  0425 08/27/23  0419   CHOLESTEROL 141 179   LDL CHOL 78 105*   HDL CHOL 40 51   TRIGLYCERIDES 130 127             Brief Urine Lab Results  (Last result in the past 365 days)        Color   Clarity   Blood   Leuk Est   Nitrite   Protein   CREAT   Urine HCG        03/07/23 0924 Yellow   Clear   Negative   Negative   Negative   Trace                   Microbiology Results Abnormal       None            Cardiac Catheterization/Vascular Study    Result Date: 8/29/2023    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   Distal left circumflex  stenoses, unchanged previous cardiac cath last year   70% SVG anastomosis first diagonal stenosis.  Unchanged from previous study.  Widely patent stents in this vein graft proximally   Severe disease in the RCA with widely patent radial graft to the right PDA   Previous cath at outside hospital showed a widely patent LIMA to LAD.      Results for orders placed during the hospital encounter of 08/26/23    Adult Transthoracic Echo Complete W/ Cont if Necessary Per Protocol    Interpretation Summary    Left ventricular systolic function is normal. Left ventricular ejection fraction appears to be 51 - 55%.    Moderate mitral valve regurgitation is present.    Estimated right ventricular systolic pressure from tricuspid regurgitation is normal (<35 mmHg).    There is no evidence of pericardial effusion      Current medications:  Scheduled Meds:aspirin, 81 mg, Oral, Daily  clopidogrel, 75 mg, Oral, Daily  donepezil, 5 mg, Oral, Nightly  insulin detemir, 10 Units, Subcutaneous, Nightly  insulin lispro, 2-9 Units, Subcutaneous, 4x Daily AC & at Bedtime  losartan, 25 mg, Oral, Daily  metoprolol tartrate, 12.5 mg, Oral, Q12H  pharmacy consult - MTM, , Does not apply, Daily  rosuvastatin, 40 mg, Oral, Nightly  senna-docusate sodium, 2 tablet, Oral, BID  sodium chloride, 10 mL, Intravenous, Q12H      Continuous Infusions:   PRN Meds:.  acetaminophen    ALPRAZolam    senna-docusate sodium **AND** polyethylene glycol **AND** bisacodyl **AND** bisacodyl    dextrose    dextrose    diphenhydrAMINE    glucagon (human recombinant)    nitroglycerin    ondansetron **OR** ondansetron    sodium chloride    sodium chloride    Assessment & Plan   Assessment & Plan     Active Hospital Problems    Diagnosis  POA    **NSTEMI (non-ST elevated myocardial infarction) [I21.4]  Yes    Peripheral artery disease [I73.9]  Yes    ACS (acute coronary syndrome) [I24.9]  Yes    Type 1 myocardial infarction [I21.9]  Yes    Poorly controlled type 2 diabetes  mellitus [E11.65]  Yes    Hypertension [I10]  Yes      Resolved Hospital Problems   No resolved problems to display.        Brief Hospital Course to date:  Giorgio Omer is a 72 y.o. male with PMH significant for HTN, HLD, CAD s/p CABG in 2001 PVD and insulin-dependent DMII. Admitted to [facility] 8/26-8/29/23 for NSTEMI. 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  to 80% disease. Diffuse 80% RCA. 80% ostial LIMA. VG to D2 patent stents with distal 90% lesion. Occluded graft to circumflex. Radial graft to PDA not found. Transferred to [facility] 8/29/23 for consideration of redo CABG vs high-risk staged PCI.      He was admitted to Cardiology service and hospital medicine was consulted for diabetes management.     This patient's problems and plans were partially entered by my partner and updated as appropriate by me 08/31/23. Copied text in this note has been reviewed and is accurate as of today's date.      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 Severe disease in RCA with widely patent radial graft to the right PDA, widely patent LIMA to LAD at outside hospital   - Continue DAPT x at least 1 year as well as high-intensity statin and Metoprolol   - On Losartan for HTN     Poorly-controlled DMII  - HbA1c 10.2%   - On SQ Victoza daily and long-acting insulin 20 units QHS  - Good control overnight with Levemir 10 units QHS  - Suspect dietary indiscretion is his primary issue. DM educator and RD have seen and educated patient.  - Will add prandial insulin at DC. Lispro 6 units TID AC - this is likely not as much  "as he really needs but we are hesitant to be overly aggressive as his understanding of his medications and health conditions appears to be poor and risk of insulin misuse is high.   - Would benefit from Endocrinology evaluation - son tells me today that [facility] [place], encouraged him to make an appointment with them within the next month for close monitoring     HTN  - On Metoprolol / Losartan as above      PVD / SFA occlusion  - Per DC summary from [place], patient underwent arterial duplex at [facility] on 7/2/23 that showed long-segment SFA occlusion with poor flow in the infrapopliteal circulation. He was seen in the ED and Vascular surgery was consulted for recommendations. He was discharged on Xarelto 2.5mg BID with plans for outpatient follow up.  - Patient reported he took anticoagulation for \"a couple of months\" before he stopped  - Cardiology recommends keep outpatient follow up with Vascular surgery      ? Cognitive impairment  - Started on Aricept 5mg QHS at OSH. Suspect due to concerns for cognitive impairment. Will send Rx at [facility] as we agree he could benefit from this.    Patient seen by PT this morning, recommend HHPT at discharge. Okay for discharge from hospitalist standpoint, my partner already completed our portion of the DC med rec yesterday. Please call with any questions.    AM-PAC 6 Clicks Score (PT): 18 (08/31/23 4490)    CODE STATUS:   Code Status and Medical Interventions:   Ordered at: 08/29/23 1417     Code Status (Patient has no pulse and is not breathing):    CPR (Attempt to Resuscitate)     Medical Interventions (Patient has pulse or is breathing):    Full Support       Anaid Moran MD  08/31/23      "

## 2023-09-01 ENCOUNTER — READMISSION MANAGEMENT (OUTPATIENT)
Dept: CALL CENTER | Facility: HOSPITAL | Age: 73
End: 2023-09-01
Payer: MEDICARE

## 2023-09-01 NOTE — CASE MANAGEMENT/SOCIAL WORK
Continued Stay Note   [facility]     Patient Name: Giorgio Omer  MRN: [id number]  Today's Date: 9/1/2023    Admit Date: 8/29/2023    Plan: Follow up   Discharge Plan       Row Name 09/01/23 1828       Plan    Plan Follow up    Plan Comments MSW received call from call center reporting difficulty obtaining medications. MSW spoke to Pt’s son who [facility] and the only medication they have been unable to obtain through the [facility] currently is Xarelto. Pt’s son inquired about [facility] pharmacy filling it, and agreeable to . [facility] pharmacy closed for the day, MSW to leave reported for weekend to-do.                   Discharge Codes    No documentation.                 Expected Discharge Date and Time       Expected Discharge Date Expected Discharge Time    Aug 31, 2023               FELIPE Turcios

## 2023-09-01 NOTE — OUTREACH NOTE
Prep Survey      Flowsheet Row Responses   [facility] facility patient discharged from? [facility]   Is LACE score < 7 ? No   Eligibility Readm Mgmt   Discharge diagnosis NSTEMI, eart cath & stent   Does the patient have one of the following disease processes/diagnoses(primary or secondary)? Acute MI (STEMI,NSTEMI)   Does the patient have Home health ordered? Yes   What is the Home health agency?  [facility]   Is there a DME ordered? No   Prep survey completed? Yes            Ella NIEVES - Registered Nurse

## 2023-09-01 NOTE — OUTREACH NOTE
AMI Week 1 Survey      Flowsheet Row Responses   [facility] patient discharged from? [place]   Does the patient have one of the following disease processes/diagnoses(primary or secondary)? Acute MI (STEMI,NSTEMI)   Week 1 attempt successful? Yes   Call start time 1708   Call end time 1737   Discharge diagnosis NSTEMI, eart cath & stent   Medication alerts for this patient son manages his medications. Pharmacy would not take his ins and called it to another pharmacy.[facility]. The pt is having to pay out of pocket/cash and could not afford the cost of Xarelto, so he does not have this new medication. I called Cardiology but the office is closed.   Meds reviewed with patient/caregiver? Yes   Is the patient having any side effects they believe may be caused by any medication additions or changes? No   Does the patient have all prescriptions related to this admission filled (includes statins,anticoagulants,HTN meds,anti-arrhythmia meds) No   What is keeping the patient from filling the prescriptions? Financial   Nursing Interventions Nurse provided patient education, Nurse advised patient to call provider, Nurse called provider   Is the patient taking all medications as directed (includes completed medication regime)? No   Nursing Interventions Nurse provided patient education, Advised patient to call provider, Notified provider   Does the patient have a primary care provider?  Yes   Does the patient have an appointment with their PCP,cardiologist,or clinic within 7 days of discharge? Yes   Has the patient kept scheduled appointments due by today? Yes   What is the Home health agency?  [facility]   Has home health visited the patient within 72 hours of discharge? Yes   Psychosocial issues? No   Comments Per pt and son, the pt has not had chest pain or other issues since DC. His [facility] site on left radial has no issues.   Did the patient  receive a copy of their discharge instructions? Yes   Nursing interventions Reviewed instructions with patient   What is the patient's perception of their health status since discharge? Improving   Nursing interventions Nurse provided patient education   Is the patient/caregiver able to teach back signs and symptoms of when to call for help immediately: Sudden chest discomfort, Sudden discomfort in arms, back, neck or jaw, Shortness of breath at any time, Dizziness or lightheadedness   Nursing interventions Nurse provided patient education, Advised patient to call provider   Is the patient/caregiver able to teach back lifestyle changes to help prevent MIs Heart healthy diet   Is the patient/caregiver able to teach back ways to prevent a second heart attack: Take medications, Follow up with MD   Is the patient/caregiver able to teach back the hierarchy of who to call/visit for symptoms/problems? PCP, Specialist, Home health nurse, Urgent Care, ED, 911 Yes   Week 1 call completed? Yes   Is the patient interested in additional calls from an ambulatory ? No   Call end time [phone]            Marixa VIZCAINO - Registered Nurse

## 2023-09-02 RX ORDER — INSULIN LISPRO 100 [IU]/ML
6 INJECTION, SOLUTION INTRAVENOUS; SUBCUTANEOUS
Qty: 6 ML | Refills: 5 | Status: SHIPPED | OUTPATIENT
Start: 2023-09-02

## 2023-09-02 NOTE — CASE MANAGEMENT/SOCIAL WORK
Continued Stay Note  [facility]     Patient Name: Giorgio Omer  MRN: [id number]  Today's Date: 9/2/2023    Admit Date: 8/29/2023    Plan:  follow up   Discharge Plan       Row Name 09/02/23 1309       Plan    Plan  follow up    Plan Comments MSW received a follow up from previous SW about pt.'s medications. MSW called pt.'s son Oscar Omer via phone and he says that pt. needs his Xarelto and Victoza [facility] [place], [place]. Pt.'s son reports he had a 10 supply of each medication to hold pt. over. Pt.'s son reports that pt. is to have an appointment arranged with Endocrinologist soon.  MSW epic chatted PA and she will send Xarelto and Humalog [facility] to [facility] but didn't prescribe Victoza and that has to go through PCP. MSW called pt.'s son and notified him. MSW is available.    Final Discharge Disposition Code 06 - home with home health care                   Discharge Codes    No documentation.                 Expected Discharge Date and Time       Expected Discharge Date Expected Discharge Time    Aug 31, 2023               FELIPE Roach

## 2024-01-03 ENCOUNTER — OFFICE VISIT (OUTPATIENT)
Dept: CARDIOLOGY | Facility: CLINIC | Age: 74
End: 2024-01-03
Payer: MEDICARE

## 2024-01-03 VITALS
HEART RATE: 77 BPM | BODY MASS INDEX: 21.38 KG/M2 | WEIGHT: 166.6 LBS | SYSTOLIC BLOOD PRESSURE: 104 MMHG | DIASTOLIC BLOOD PRESSURE: 60 MMHG | HEIGHT: 74 IN | OXYGEN SATURATION: 98 %

## 2024-01-03 DIAGNOSIS — Z79.4 TYPE 2 DIABETES MELLITUS WITHOUT COMPLICATION, WITH LONG-TERM CURRENT USE OF INSULIN: ICD-10-CM

## 2024-01-03 DIAGNOSIS — E11.9 TYPE 2 DIABETES MELLITUS WITHOUT COMPLICATION, WITH LONG-TERM CURRENT USE OF INSULIN: ICD-10-CM

## 2024-01-03 DIAGNOSIS — I25.10 CORONARY ARTERY DISEASE INVOLVING NATIVE CORONARY ARTERY OF NATIVE HEART WITHOUT ANGINA PECTORIS: Primary | ICD-10-CM

## 2024-01-03 DIAGNOSIS — E78.2 MIXED HYPERLIPIDEMIA: ICD-10-CM

## 2024-01-03 DIAGNOSIS — I10 PRIMARY HYPERTENSION: ICD-10-CM

## 2024-01-03 RX ORDER — INSULIN GLARGINE 100 [IU]/ML
15 INJECTION, SOLUTION SUBCUTANEOUS NIGHTLY
COMMUNITY
Start: 2023-09-01 | End: 2024-08-31

## 2024-01-03 NOTE — PROGRESS NOTES
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    PROBLEM LIST:    Coronary artery disease:  Surgical revascularization,  Dr. Bryant, 07/01, [facility], receiving LIMA to LAD, free radial graft to PDA, saphenous graft to first diagonal, saphenous graft to obtuse marginal.  7/24/19 MPS exercise cardiolite wnl  8/22 LHC per TM 4.0X 38 Xience to svg- D  patent lima/lad, radial to PDA. 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  HG A1c 8.6.   8/22 10.6  8/23 10.2   Dyslipidemia:  March 2015:  Total cholesterol 143, triglycerides  326, HDL 39, LDL 38.  8/22 188/185/42/114  Hypertension, presumed essential.   PVD    7/23 OSH duplex long SFA occl- committed to [facility]  CVD - 4/17 CUS wnl   Baseline right bundle-branch block.   Left Lower Extremity Edema  US LLE: 2016 Partial DVT      CC:   Chief Complaint   Patient presents with    Coronary Artery Disease             Allergies  Allergies   Allergen Reactions    Cardizem [Diltiazem Hcl] Rash    Celecoxib Rash       Current Medications    Current Outpatient Medications:     acetaminophen (TYLENOL) 325 MG tablet, Take 2 tablets by mouth Every 4 (Four) Hours As Needed for Mild Pain or Fever (temperature greater than 101F)., Disp: , Rfl:     aspirin 81 MG chewable tablet, Chew 1 tablet Daily., Disp: 90 tablet, Rfl: 3    clopidogrel (PLAVIX) 75 MG tablet, Take 1 tablet by mouth Daily., Disp: 90 tablet, Rfl: 3    donepezil (ARICEPT) 5 MG tablet, Take 1 tablet by mouth Every Night. Additional refills per PCP, Disp: 30  "tablet, Rfl: 5    insulin glargine (LANTUS, SEMGLEE) 100 UNIT/ML injection, Inject 15 Units under the skin into the appropriate area as directed Every Night., Disp: , Rfl:     Insulin Lispro, 1 Unit Dial, (HUMALOG) 100 UNIT/ML solution pen-injector, Inject 6 Units under the skin into the appropriate area as directed 3 (Three) Times a Day Before Meals., Disp: 6 mL, Rfl: 5    Insulin Pen Needle (Pen Needles) 32G X 4 MM misc, Use 1 each 3 (Three) Times a Day Before Meals., Disp: 100 each, Rfl: 5    losartan (COZAAR) 25 MG tablet, Take 1 tablet by mouth Daily., Disp: 90 tablet, Rfl: 0    metoprolol tartrate (LOPRESSOR) 25 MG tablet, Take 0.5 tablets by mouth Every 12 (Twelve) Hours., Disp: 30 tablet, Rfl: 5    Misc Natural Products (PROSTATE HEALTH PO), Take 1 tablet by mouth As Needed., Disp: , Rfl:     Omega-3 1000 MG capsule, Take 1 capsule by mouth Daily., Disp: , Rfl:     rosuvastatin (CRESTOR) 40 MG tablet, Take 1 tablet by mouth Every Night., Disp: 90 tablet, Rfl: 3    zinc sulfate (ZINCATE) 220 (50 Zn) MG capsule, Take 1 capsule by mouth Daily., Disp: , Rfl:       History of Present Illness   Giorgio Omer is a 73 y.o. year old male here for follow up.    Had become noncompliant again this fall was placed back in the hospital with angina and repeat catheterization subsequent stenting.  He is now more compliant on his medications and his son is making sure that he adheres to his plan  He is having some right hip pain he does not know whether this is sciatica nor arthritis  OBJECTIVE:  Vitals:    01/03/24 1100   BP: 104/60   BP Location: Right arm   Patient Position: Sitting   Pulse: 77   SpO2: 98%   Weight: 75.6 kg (166 lb 9.6 oz)   Height: 188 cm (74\")     Body mass index is 21.39 kg/m².    Vitals reviewed.   Constitutional:       General: Awake.      Appearance: Healthy appearance. Well-developed. Frail.   Neck:      Thyroid: No thyromegaly.      Vascular: No carotid bruit or JVD.      Trachea: No tracheal " deviation.   Pulmonary:      Effort: Pulmonary effort is normal. No respiratory distress.      Breath sounds: Normal breath sounds. No wheezing. No rales.   Cardiovascular:      Normal rate. Regular rhythm. Normal S1. Normal S2.       Murmurs: There is no murmur.      No gallop.  No click. No rub.   Pulses:     Intact distal pulses.   Edema:     Peripheral edema absent.   Abdominal:      General: Bowel sounds are normal.      Palpations: Abdomen is soft. There is no abdominal mass.      Tenderness: There is no abdominal tenderness. There is no guarding.   Musculoskeletal:      Cervical back: Normal range of motion and neck supple. Skin:     General: Skin is warm and dry.   Neurological:      Mental Status: Alert, oriented to person, place, and time and oriented to person, place and time.   Psychiatric:         Attention and Perception: Attention normal.         Mood and Affect: Mood normal.         Speech: Speech normal.         Behavior: Behavior normal. Behavior is cooperative.         Diagnostic Data:  Procedures      ASSESSMENT:   Diagnosis Plan   1. Coronary artery disease involving native coronary artery of native heart without angina pectoris        2. Primary hypertension        3. Mixed hyperlipidemia        4. Type 2 diabetes mellitus without complication, with long-term current use of insulin              PLAN:  1.  CAD post recent PCI and stenting remote surgical revascularization continue GDMT and DAPT until at least August .  [facility] low-dose Xarelto for his PVD of which she is not taking at current  2.  Today the patient does not relay any anginal nor atypical anginal symptoms.  3. Hypertension well-controlled patient is not on any antihypertensive.  4.  [facility] from [facility] for further assessment of diabetes mellitus and dyslipidemia.   5.  Right hip pain -1/22 MRI of the right hip without obvious abnormality, historically no improvement with trochanteric injections.  For follow-up with  lumbar back physician he states     Dr. Dionicio Trevino M.D. [facility]

## 2024-03-15 ENCOUNTER — OFFICE VISIT (OUTPATIENT)
Dept: UROLOGY | Facility: CLINIC | Age: 74
End: 2024-03-15
Payer: MEDICARE

## 2024-03-15 VITALS
HEIGHT: 74 IN | BODY MASS INDEX: 20.82 KG/M2 | WEIGHT: 162.2 LBS | DIASTOLIC BLOOD PRESSURE: 64 MMHG | SYSTOLIC BLOOD PRESSURE: 107 MMHG | HEART RATE: 94 BPM

## 2024-03-15 DIAGNOSIS — N52.01 ERECTILE DYSFUNCTION DUE TO ARTERIAL INSUFFICIENCY: Primary | ICD-10-CM

## 2024-03-15 DIAGNOSIS — Z48.816 AFTERCARE FOR CIRCUMCISION: ICD-10-CM

## 2024-03-15 DIAGNOSIS — N48.6 PEYRONIE DISEASE: ICD-10-CM

## 2024-03-15 PROBLEM — N47.1 PHIMOSIS: Status: RESOLVED | Noted: 2023-01-16 | Resolved: 2024-03-15

## 2024-03-15 NOTE — PROGRESS NOTES
"Chief Complaint:    Chief Complaint   Patient presents with    Aftercare for circumcision       Vital Signs:   /64 (BP Location: Left arm, Patient Position: Sitting, Cuff Size: Adult)   Pulse 94   Ht 188 cm (74.02\")   Wt 73.6 kg (162 lb 3.2 oz)   BMI 20.82 kg/m²   Body mass index is 20.82 kg/m².      HPI:  Giorgio Omer is a 73 y.o. male who presents today for follow up    History of Present Illness  Mr. Omer presents to the clinic for follow-up.  He was last seen in office postelective circumcision by Dr. Espinoza on 2/27/2023.  Patient's pathology report at that time showed benign foreskin with chronic inflammation.  At his follow-up appointment patient had left dressing on for 1 week and he was removed in office which showed some mild bleeding.  He was scheduled to follow-up 1 month later however canceled appointment.  At that office visit he was also referred to Dr. Silvestre dean for Peyronie's and erectile dysfunction however when  told him to schedule the appointment he stated he had a urologist in [place] and did not need to come.  He Sang presents today for evaluations of slight penile swelling as well as erectile dysfunction.  Patient states he is healed well from previous circumcision.  He reports mild inflammation around the head of the penis but denies any pain.  Does endorse continuation of difficulty maintaining obtaining erections.  He has tried PDE 5 inhibitors with no success.  Did discuss the use of penile injections versus penis pump.  Patient still wishes to be referred to  but will try injections in the meantime.  He denies any difficulty urinating.  Physical exam today reveals a well-healed surgical incision with abnormalities noted of the glans or shaft of the penis.       Past Medical History:  Past Medical History:   Diagnosis Date    Arthritis     Coronary artery disease 10/17/2016    Surgical revascularization, Dr. Byrant, 07/01, [facility], receiving LIMA to LAD, free " radial graft to PDA, saphenous graft to first diagonal, saphenous graft to obtuse marginal. MPS, May 2008, per [facility]: Normal perfusion, EF 68%.  April 2012 - exercise Cardiolite WNL, EF preserved.    Diabetes mellitus 10/17/2016    on Metformin times one year, onset 2009:  March 2015.  HG A1c 8.6.     Dyslipidemia 10/17/2016    March 2015:  Total cholesterol 143, triglycerides 326, HDL 39, LDL 38.    Hyperlipidemia     Hypertension 10/17/2016    presumed essential.     Right bundle branch block 10/17/2016    baseline       Current Meds:  Current Outpatient Medications   Medication Sig Dispense Refill    acetaminophen (TYLENOL) 325 MG tablet Take 2 tablets by mouth Every 4 (Four) Hours As Needed for Mild Pain or Fever (temperature greater than 101F).      aspirin 81 MG chewable tablet Chew 1 tablet Daily. 90 tablet 3    clopidogrel (PLAVIX) 75 MG tablet Take 1 tablet by mouth Daily. 90 tablet 3    donepezil (ARICEPT) 5 MG tablet Take 1 tablet by mouth Every Night. Additional refills per PCP 30 tablet 5    insulin glargine (LANTUS, SEMGLEE) 100 UNIT/ML injection Inject 15 Units under the skin into the appropriate area as directed Every Night.      Insulin Lispro, 1 Unit Dial, (HUMALOG) 100 UNIT/ML solution pen-injector Inject 6 Units under the skin into the appropriate area as directed 3 (Three) Times a Day Before Meals. 6 mL 5    Insulin Pen Needle (Pen Needles) 32G X 4 MM misc Use 1 each 3 (Three) Times a Day Before Meals. 100 each 5    losartan (COZAAR) 25 MG tablet Take 1 tablet by mouth Daily. 90 tablet 0    metoprolol tartrate (LOPRESSOR) 25 MG tablet Take 0.5 tablets by mouth Every 12 (Twelve) Hours. 30 tablet 5    Misc Natural Products (PROSTATE HEALTH PO) Take 1 tablet by mouth As Needed.      Omega-3 1000 MG capsule Take 1 capsule by mouth Daily.      rosuvastatin (CRESTOR) 40 MG tablet Take 1 tablet by mouth Every Night. 90 tablet 3    zinc sulfate (ZINCATE) 220 (50 Zn) MG capsule Take 1 capsule by mouth  Daily.       No current facility-administered medications for this visit.        Allergies:   Allergies   Allergen Reactions    Cardizem [Diltiazem Hcl] Rash    Celecoxib Rash        Past Surgical History:  Past Surgical History:   Procedure Laterality Date    CARDIAC CATHETERIZATION N/A 2022    Procedure: LEFT HEART CATH;  Surgeon: Sriram Bowers MD;  Location:  [facility];  Service: Cardiovascular;  Laterality: N/A;    CARDIAC CATHETERIZATION N/A 2023    Procedure: Coronary angiography;  Surgeon: Fabiano Duran MD;  [facility];  Service: Cardiology;  Laterality: N/A;    CARDIAC CATHETERIZATION N/A 2023    Procedure: Percutaneous Coronary Intervention;  Surgeon: Sriram Bowers MD;  Location:  [facility];  Service: Cardiovascular;  Laterality: N/A;    CIRCUMCISION N/A 2023    Procedure: CIRCUMCISION;  Surgeon: Himanshu Espinoza MD;  Location:  [facility] OR;  Service: Urology;  Laterality: N/A;    COLONOSCOPY      CORONARY ARTERY BYPASS GRAFT         Social History:  Social History     Socioeconomic History    Marital status:    Tobacco Use    Smoking status: Former     Current packs/day: 0.00     Types: Cigarettes     Quit date: 10/28/2001     Years since quittin.3    Smokeless tobacco: Never   Vaping Use    Vaping status: Never Used   Substance and Sexual Activity    Alcohol use: No    Drug use: No    Sexual activity: Defer       Family History:  Family History   Problem Relation Age of Onset    Ovarian cancer Mother     Hypertension Father     Stroke Father        Review of Systems:  Review of Systems   Constitutional:  Negative for fatigue, fever and unexpected weight change.   Respiratory:  Negative for chest tightness and shortness of breath.    Cardiovascular:  Negative for chest pain.   Gastrointestinal:  Negative for abdominal pain, constipation, diarrhea, nausea and vomiting.   Genitourinary:   Positive for penile pain. Negative for difficulty urinating, dysuria, frequency, hematuria, penile swelling and urgency.        Erectile dysfunction   Skin:  Negative for rash.   Psychiatric/Behavioral:  Negative for confusion and suicidal ideas.        Physical Exam:  Physical Exam  Constitutional:       General: He is not in acute distress.     Appearance: Normal appearance.   HENT:      Head: Normocephalic and atraumatic.      Nose: Nose normal.      Mouth/Throat:      Mouth: Mucous membranes are moist.   Eyes:      Conjunctiva/sclera: Conjunctivae normal.   Cardiovascular:      Rate and Rhythm: Normal rate and regular rhythm.      Pulses: Normal pulses.      Heart sounds: Normal heart sounds.   Pulmonary:      Effort: Pulmonary effort is normal.      Breath sounds: Normal breath sounds.   Abdominal:      General: Bowel sounds are normal.      Palpations: Abdomen is soft.   Musculoskeletal:         General: Normal range of motion.      Cervical back: Normal range of motion.   Skin:     General: Skin is warm.   Neurological:      General: No focal deficit present.      Mental Status: He is alert and oriented to person, place, and time.   Psychiatric:         Mood and Affect: Mood normal.         Behavior: Behavior normal.         Thought Content: Thought content normal.         Judgment: Judgment normal.           Recent Image (CT and/or KUB):   CT Abdomen and Pelvis: No results found for this or any previous visit.     CT Stone Protocol: No results found for this or any previous visit.     KUB: No results found for this or any previous visit.       Labs:  Brief Urine Lab Results       None          No visits with results within 3 Month(s) from this visit.   Latest known visit with results is:   Admission on 08/29/2023, Discharged on 08/31/2023   Component Date Value Ref Range Status    Heparin Anti-Xa (UFH) 08/29/2023 0.10 (L)  0.30 - 0.70 IU/ml Final    Glucose 08/29/2023 180 (H)  70 - 130 mg/dL Final    Glucose  08/29/2023 293 (H)  70 - 130 mg/dL Final    Glucose 08/30/2023 122 (H)  65 - 99 mg/dL Final    BUN 08/30/2023 20  8 - 23 mg/dL Final    Creatinine 08/30/2023 0.91  0.76 - 1.27 mg/dL Final    Sodium 08/30/2023 136  136 - 145 mmol/L Final    Potassium 08/30/2023 3.9  3.5 - 5.2 mmol/L Final    Chloride 08/30/2023 99  98 - 107 mmol/L Final    CO2 08/30/2023 28.0  22.0 - 29.0 mmol/L Final    Calcium 08/30/2023 9.0  8.6 - 10.5 mg/dL Final    Total Protein 08/30/2023 6.4  6.0 - 8.5 g/dL Final    Albumin 08/30/2023 3.5  3.5 - 5.2 g/dL Final    ALT (SGPT) 08/30/2023 12  1 - 41 U/L Final    AST (SGOT) 08/30/2023 28  1 - 40 U/L Final    Alkaline Phosphatase 08/30/2023 74  39 - 117 U/L Final    Total Bilirubin 08/30/2023 1.3 (H)  0.0 - 1.2 mg/dL Final    Globulin 08/30/2023 2.9  gm/dL Final    Calculated Result    A/G Ratio 08/30/2023 1.2  g/dL Final    BUN/Creatinine Ratio 08/30/2023 22.0  7.0 - 25.0 Final    Anion Gap 08/30/2023 9.0  5.0 - 15.0 mmol/L Final    eGFR 08/30/2023 89.0  >60.0 mL/min/1.73 Final    WBC 08/30/2023 6.56  3.40 - 10.80 10*3/mm3 Final    RBC 08/30/2023 5.11  4.14 - 5.80 10*6/mm3 Final    Hemoglobin 08/30/2023 15.2  13.0 - 17.7 g/dL Final    Hematocrit 08/30/2023 45.1  37.5 - 51.0 % Final    MCV 08/30/2023 88.3  79.0 - 97.0 fL Final    MCH 08/30/2023 29.7  26.6 - 33.0 pg Final    MCHC 08/30/2023 33.7  31.5 - 35.7 g/dL Final    RDW 08/30/2023 11.4 (L)  12.3 - 15.4 % Final    RDW-SD 08/30/2023 37.1  37.0 - 54.0 fl Final    MPV 08/30/2023 12.3 (H)  6.0 - 12.0 fL Final    Platelets 08/30/2023 161  140 - 450 10*3/mm3 Final    Total Cholesterol 08/30/2023 141  0 - 200 mg/dL Final    Triglycerides 08/30/2023 130  0 - 150 mg/dL Final    HDL Cholesterol 08/30/2023 40  40 - 60 mg/dL Final    LDL Cholesterol  08/30/2023 78  0 - 100 mg/dL Final    VLDL Cholesterol 08/30/2023 23  5 - 40 mg/dL Final    LDL/HDL Ratio 08/30/2023 1.88   Final    QT Interval 08/30/2023 446  ms Final    QTC Interval 08/30/2023 481  ms Final     Glucose 08/30/2023 123  70 - 130 mg/dL Final    Glucose 08/30/2023 238 (H)  70 - 130 mg/dL Final    Glucose 08/30/2023 219 (H)  70 - 130 mg/dL Final    Glucose 08/30/2023 282 (H)  70 - 130 mg/dL Final    Glucose 08/31/2023 132 (H)  70 - 130 mg/dL Final    Glucose 08/31/2023 273 (H)  70 - 130 mg/dL Final        Procedure: None  Procedures     I have reviewed and agree with the above PMH, PSH, FMH, social history, medications, allergies, and labs.     Assessment/Plan:   Problem List Items Addressed This Visit          Genitourinary and Reproductive     Peyronie disease    Aftercare for circumcision    Erectile dysfunction due to arterial insufficiency - Primary       Health Maintenance:   Health Maintenance Due   Topic Date Due    URINE MICROALBUMIN  Never done    Pneumococcal Vaccine 65+ (1 of 2 - PCV) Never done    TDAP/TD VACCINES (1 - Tdap) Never done    ZOSTER VACCINE (1 of 2) Never done    RSV Vaccine - Adults (1 - 1-dose 60+ series) Never done    HEPATITIS C SCREENING  Never done    DIABETIC FOOT EXAM  Never done    DIABETIC EYE EXAM  Never done    ANNUAL WELLNESS VISIT  05/20/2022    INFLUENZA VACCINE  08/01/2023    COVID-19 Vaccine (1 - 2023-24 season) Never done    HEMOGLOBIN A1C  02/26/2024        Smoking Counseling: Former smoker.  Never used smokeless tobacco.  Counseling given.    Urine Incontinence: Patient reports that he is not currently experiencing any symptoms of urinary incontinence.    Patient was given instructions and counseling regarding his condition or for health maintenance advice. Please see specific information pulled into the AVS if appropriate.    Patient Education:   Aftercare circumcision -patient is roughly 1 year post elective circumcision by Dr. Espinoza and is healed appropriately.  Physical exam today is unremarkable.  He denies any difficulty urinating and phimosis has resolved.  Erectile dysfunction and Peyronie's disease -patient continues to have erectile dysfunction with  abnormal curvature of the penis.  He has tried PDE 5 inhibitors in the past with no improvement.  Did discuss with the patient the use of Trimix injections in office.  Discussed the risk and benefits of these medications in detail.  Discussed the risk of for priapism.  Advised him this is a medical emergency and requires immediate evaluation and correction.  Also discussed the use of surgeries such as penile implants.  Patient wishes to try Trimix at this time and we will send in a prescription to compounding pharmacy in [facility].  I will also will refer him back to Dr. Silvestre Jones's at the [facility] for evaluation of a penile implant.  Patient verbalized understanding.    Visit Diagnoses:    ICD-10-CM ICD-9-CM   1. Erectile dysfunction due to arterial insufficiency  N52.01 607.84   2. Peyronie disease  N48.6 607.85   3. Aftercare for circumcision  Z48.816 V58.76       Meds Ordered During Visit:  No orders of the defined types were placed in this encounter.      Follow Up Appointment: As needed  No follow-ups on file.      This document has been electronically signed by Silvio Roblero PA-C   March 15, 2024 15:33 EDT    Part of this note may be an electronic transcription/translation of spoken language to printed text using the Dragon Dictation System.

## 2024-03-22 ENCOUNTER — TELEPHONE (OUTPATIENT)
Dept: UROLOGY | Facility: CLINIC | Age: 74
End: 2024-03-22
Payer: MEDICARE

## 2024-03-22 NOTE — TELEPHONE ENCOUNTER
I faxed the referral and notes to  urology on 3/15/24.  They will contact the patient with an appointment after reviewing the referral.

## 2024-03-27 ENCOUNTER — TELEPHONE (OUTPATIENT)
Dept: UROLOGY | Facility: CLINIC | Age: 74
End: 2024-03-27
Payer: MEDICARE

## 2024-03-27 NOTE — TELEPHONE ENCOUNTER
Spoke with Oscar Omer patient soon..  He takes care of all the appointments. I explained that  had called his father and told them he had a urologyist in [place] and didn't need to schedule an appointment.  I gave his son Oscar the number to  urology and he said that he would be calling them tomorrow to schedule an appointment.

## 2024-03-27 NOTE — TELEPHONE ENCOUNTER
I spoke with [facility] and was told he didn't want to schedule an appointment due to already having a urologist in [facility].  I called the patient but was told to call back in about 30 minutes .

## 2024-04-01 ENCOUNTER — TELEPHONE (OUTPATIENT)
Dept: UROLOGY | Facility: CLINIC | Age: 74
End: 2024-04-01
Payer: MEDICARE

## 2024-04-01 NOTE — TELEPHONE ENCOUNTER
I Spoke with Oscar Omer patient son and he stated that the patient was still think about going to  urology and is going to let him know this week.  I asked for him to please call the office and let us know what he decides.

## 2024-04-12 ENCOUNTER — HOSPITAL ENCOUNTER (EMERGENCY)
Facility: HOSPITAL | Age: 74
Discharge: HOME | End: 2024-04-12
Attending: EMERGENCY MEDICINE
Payer: OTHER GOVERNMENT

## 2024-04-12 VITALS
DIASTOLIC BLOOD PRESSURE: 79 MMHG | RESPIRATION RATE: 16 BRPM | OXYGEN SATURATION: 99 % | SYSTOLIC BLOOD PRESSURE: 146 MMHG | TEMPERATURE: 98 F | WEIGHT: 168 LBS | HEIGHT: 72 IN | HEART RATE: 63 BPM | BODY MASS INDEX: 22.75 KG/M2

## 2024-04-12 DIAGNOSIS — L03.115 CELLULITIS OF RIGHT FOOT: Primary | ICD-10-CM

## 2024-04-12 LAB
ALBUMIN SERPL-MCNC: 4.2 G/DL (ref 3.5–5.2)
ALBUMIN/GLOB SERPL: 1.5 G/DL
ALP SERPL-CCNC: 82 U/L (ref 39–117)
ALT SERPL W P-5'-P-CCNC: 26 U/L (ref 1–41)
ANION GAP SERPL CALCULATED.3IONS-SCNC: 8.7 MMOL/L (ref 5–15)
AST SERPL-CCNC: 25 U/L (ref 1–40)
BASOPHILS # BLD AUTO: 0.12 10*3/MM3 (ref 0–0.2)
BASOPHILS NFR BLD AUTO: 1.8 % (ref 0–1.5)
BILIRUB SERPL-MCNC: 0.9 MG/DL (ref 0–1.2)
BUN SERPL-MCNC: 15 MG/DL (ref 8–23)
BUN/CREAT SERPL: 14.3 (ref 7–25)
CALCIUM SPEC-SCNC: 9.4 MG/DL (ref 8.6–10.5)
CHLORIDE SERPL-SCNC: 97 MMOL/L (ref 98–107)
CO2 SERPL-SCNC: 27.3 MMOL/L (ref 22–29)
CREAT SERPL-MCNC: 1.05 MG/DL (ref 0.76–1.27)
CRP SERPL-MCNC: <0.3 MG/DL (ref 0–0.5)
DEPRECATED RDW RBC AUTO: 38.4 FL (ref 37–54)
EGFRCR SERPLBLD CKD-EPI 2021: 74.5 ML/MIN/1.73
EOSINOPHIL # BLD AUTO: 0.21 10*3/MM3 (ref 0–0.4)
EOSINOPHIL NFR BLD AUTO: 3.2 % (ref 0.3–6.2)
ERYTHROCYTE [DISTWIDTH] IN BLOOD BY AUTOMATED COUNT: 11.9 % (ref 12.3–15.4)
ERYTHROCYTE [SEDIMENTATION RATE] IN BLOOD: 1 MM/HR (ref 0–20)
GLOBULIN UR ELPH-MCNC: 2.8 GM/DL
GLUCOSE SERPL-MCNC: 465 MG/DL (ref 65–99)
HCT VFR BLD AUTO: 44.7 % (ref 37.5–51)
HGB BLD-MCNC: 15.1 G/DL (ref 13–17.7)
IMM GRANULOCYTES # BLD AUTO: 0.02 10*3/MM3 (ref 0–0.05)
IMM GRANULOCYTES NFR BLD AUTO: 0.3 % (ref 0–0.5)
LYMPHOCYTES # BLD AUTO: 1.76 10*3/MM3 (ref 0.7–3.1)
LYMPHOCYTES NFR BLD AUTO: 26.9 % (ref 19.6–45.3)
MCH RBC QN AUTO: 29.5 PG (ref 26.6–33)
MCHC RBC AUTO-ENTMCNC: 33.8 G/DL (ref 31.5–35.7)
MCV RBC AUTO: 87.3 FL (ref 79–97)
MONOCYTES # BLD AUTO: 0.42 10*3/MM3 (ref 0.1–0.9)
MONOCYTES NFR BLD AUTO: 6.4 % (ref 5–12)
NEUTROPHILS NFR BLD AUTO: 4.02 10*3/MM3 (ref 1.7–7)
NEUTROPHILS NFR BLD AUTO: 61.4 % (ref 42.7–76)
NRBC BLD AUTO-RTO: 0 /100 WBC (ref 0–0.2)
PLATELET # BLD AUTO: 162 10*3/MM3 (ref 140–450)
PMV BLD AUTO: 12 FL (ref 6–12)
POTASSIUM SERPL-SCNC: 4.4 MMOL/L (ref 3.5–5.2)
PROT SERPL-MCNC: 7 G/DL (ref 6–8.5)
RBC # BLD AUTO: 5.12 10*6/MM3 (ref 4.14–5.8)
SODIUM SERPL-SCNC: 133 MMOL/L (ref 136–145)
WBC NRBC COR # BLD AUTO: 6.55 10*3/MM3 (ref 3.4–10.8)

## 2024-04-12 PROCEDURE — 85025 COMPLETE CBC W/AUTO DIFF WBC: CPT | Performed by: EMERGENCY MEDICINE

## 2024-04-12 PROCEDURE — 85652 RBC SED RATE AUTOMATED: CPT | Performed by: EMERGENCY MEDICINE

## 2024-04-12 PROCEDURE — 63710000001 ONDANSETRON ODT 4 MG TABLET DISPERSIBLE: Performed by: EMERGENCY MEDICINE

## 2024-04-12 PROCEDURE — 99283 EMERGENCY DEPT VISIT LOW MDM: CPT

## 2024-04-12 PROCEDURE — 86140 C-REACTIVE PROTEIN: CPT | Performed by: EMERGENCY MEDICINE

## 2024-04-12 PROCEDURE — 80053 COMPREHEN METABOLIC PANEL: CPT | Performed by: EMERGENCY MEDICINE

## 2024-04-12 PROCEDURE — 63710000001 INSULIN REGULAR HUMAN PER 5 UNITS: Performed by: EMERGENCY MEDICINE

## 2024-04-12 PROCEDURE — 36415 COLL VENOUS BLD VENIPUNCTURE: CPT

## 2024-04-12 RX ORDER — DOXYCYCLINE 100 MG/1
100 CAPSULE ORAL 2 TIMES DAILY
Qty: 20 CAPSULE | Refills: 0 | Status: SHIPPED | OUTPATIENT
Start: 2024-04-12 | End: 2024-04-22

## 2024-04-12 RX ORDER — AMOXICILLIN AND CLAVULANATE POTASSIUM 875; 125 MG/1; MG/1
1 TABLET, FILM COATED ORAL 2 TIMES DAILY
Qty: 20 TABLET | Refills: 0 | Status: SHIPPED | OUTPATIENT
Start: 2024-04-12 | End: 2024-04-22

## 2024-04-12 RX ORDER — DOXYCYCLINE 100 MG/1
100 CAPSULE ORAL ONCE
Status: COMPLETED | OUTPATIENT
Start: 2024-04-12 | End: 2024-04-12

## 2024-04-12 RX ORDER — HYDROCODONE BITARTRATE AND ACETAMINOPHEN 5; 325 MG/1; MG/1
1 TABLET ORAL ONCE
Status: COMPLETED | OUTPATIENT
Start: 2024-04-12 | End: 2024-04-12

## 2024-04-12 RX ORDER — AMOXICILLIN AND CLAVULANATE POTASSIUM 875; 125 MG/1; MG/1
1 TABLET, FILM COATED ORAL ONCE
Status: COMPLETED | OUTPATIENT
Start: 2024-04-12 | End: 2024-04-12

## 2024-04-12 RX ORDER — ONDANSETRON 4 MG/1
4 TABLET, ORALLY DISINTEGRATING ORAL ONCE
Status: COMPLETED | OUTPATIENT
Start: 2024-04-12 | End: 2024-04-12

## 2024-04-12 RX ADMIN — HYDROCODONE BITARTRATE AND ACETAMINOPHEN 1 TABLET: 5; 325 TABLET ORAL at 20:23

## 2024-04-12 RX ADMIN — AMOXICILLIN AND CLAVULANATE POTASSIUM 1 TABLET: 875; 125 TABLET, FILM COATED ORAL at 20:23

## 2024-04-12 RX ADMIN — DOXYCYCLINE 100 MG: 100 CAPSULE ORAL at 20:23

## 2024-04-12 RX ADMIN — ONDANSETRON 4 MG: 4 TABLET, ORALLY DISINTEGRATING ORAL at 20:22

## 2024-04-12 RX ADMIN — INSULIN HUMAN 10 UNITS: 100 INJECTION, SOLUTION PARENTERAL at 21:17

## 2024-04-13 NOTE — ED PROVIDER NOTES
Subjective     History provided by:  Patient   used: No    Foot Pain  Location:  Right foot pain  Quality:  Patient reports that the pain is 5 out of 10 on the pain severity scale that is a constant sharp pain  Severity:  Moderate  Onset quality:  Gradual  Duration:  2 days  Timing:  Constant  Progression:  Worsening  Chronicity:  New  Context:  Right foot pain and redness  Relieved by:  Nothing  Associated symptoms: no abdominal pain, no chest pain, no congestion, no cough, no diarrhea, no ear pain, no fatigue, no fever, no headaches, no loss of consciousness, no myalgias, no nausea, no rash, no rhinorrhea, no shortness of breath, no sore throat, no vomiting and no wheezing        Review of Systems   Constitutional:  Negative for activity change, appetite change, chills, diaphoresis, fatigue and fever.   HENT:  Negative for congestion, ear pain, rhinorrhea and sore throat.    Eyes:  Negative for redness.   Respiratory:  Negative for cough, chest tightness, shortness of breath and wheezing.    Cardiovascular:  Negative for chest pain, palpitations and leg swelling.   Gastrointestinal:  Negative for abdominal pain, diarrhea, nausea and vomiting.   Genitourinary:  Negative for dysuria and urgency.   Musculoskeletal:  Negative for arthralgias, back pain, myalgias and neck pain.   Skin:  Negative for pallor, rash and wound.   Neurological:  Negative for dizziness, loss of consciousness, speech difficulty, weakness and headaches.   Psychiatric/Behavioral:  Negative for agitation, behavioral problems, confusion and decreased concentration.    All other systems reviewed and are negative.      Past Medical History:   Diagnosis Date    Arthritis     Coronary artery disease 10/17/2016    Surgical revascularization, Dr. Bryant, 07/01, [facility], receiving LIMA to LAD, free radial graft to PDA, saphenous graft to first diagonal, saphenous graft to obtuse marginal. MPS, May 2008, per [facility]: Normal perfusion, EF  68%.  2012 - exercise Cardiolite WNL, EF preserved.    Diabetes mellitus 10/17/2016    on Metformin times one year, onset :  2015.  HG A1c 8.6.     Dyslipidemia 10/17/2016    2015:  Total cholesterol 143, triglycerides 326, HDL 39, LDL 38.    Hyperlipidemia     Hypertension 10/17/2016    presumed essential.     Right bundle branch block 10/17/2016    baseline       Allergies   Allergen Reactions    Cardizem [Diltiazem Hcl] Rash    Celecoxib Rash       Past Surgical History:   Procedure Laterality Date    CARDIAC CATHETERIZATION N/A 2022    Procedure: LEFT HEART CATH;  Surgeon: Sriram Bowers MD;  Location:  [facility];  Service: Cardiovascular;  Laterality: N/A;    CARDIAC CATHETERIZATION N/A 2023    Procedure: Coronary angiography;  Surgeon: Fabiano Duran MD;  [facility];  Service: Cardiology;  Laterality: N/A;    CARDIAC CATHETERIZATION N/A 2023    Procedure: Percutaneous Coronary Intervention;  Surgeon: Sriram Bowers MD;  Location:  [facility];  Service: Cardiovascular;  Laterality: N/A;    CIRCUMCISION N/A 2023    Procedure: CIRCUMCISION;  Surgeon: Himanshu Espinoza MD;  Location:  [facility];  Service: Urology;  Laterality: N/A;    COLONOSCOPY      CORONARY ARTERY BYPASS GRAFT         Family History   Problem Relation Age of Onset    Ovarian cancer Mother     Hypertension Father     Stroke Father        Social History     Socioeconomic History    Marital status:    Tobacco Use    Smoking status: Former     Current packs/day: 0.00     Types: Cigarettes     Quit date: 10/28/2001     Years since quittin.4    Smokeless tobacco: Never   Vaping Use    Vaping status: Never Used   Substance and Sexual Activity    Alcohol use: No    Drug use: No    Sexual activity: Defer           Objective   Physical Exam  Vitals and nursing note reviewed.   Constitutional:       General: He is not in  acute distress.     Appearance: Normal appearance. He is well-developed. He is not toxic-appearing or diaphoretic.   HENT:      Head: Normocephalic and atraumatic.      Right Ear: External ear normal.      Left Ear: External ear normal.      Nose: Nose normal.      Mouth/Throat:      Pharynx: No oropharyngeal exudate.      Tonsils: No tonsillar exudate.   Eyes:      General: Lids are normal.      Conjunctiva/sclera: Conjunctivae normal.      Pupils: Pupils are equal, round, and reactive to light.   Neck:      Thyroid: No thyromegaly.   Cardiovascular:      Rate and Rhythm: Normal rate and regular rhythm.      Pulses: Normal pulses.      Heart sounds: Normal heart sounds, S1 normal and S2 normal.   Pulmonary:      Effort: Pulmonary effort is normal. No tachypnea or respiratory distress.      Breath sounds: Normal breath sounds. No decreased breath sounds, wheezing or rales.   Chest:      Chest wall: No tenderness.   Abdominal:      General: Bowel sounds are normal. There is no distension.      Palpations: Abdomen is soft.      Tenderness: There is no abdominal tenderness. There is no guarding or rebound.   Musculoskeletal:         General: Tenderness present. No deformity. Normal range of motion.      Cervical back: Full passive range of motion without pain, normal range of motion and neck supple.        Legs:    Lymphadenopathy:      Cervical: No cervical adenopathy.   Skin:     General: Skin is warm and dry.      Coloration: Skin is not pale.      Findings: No erythema or rash.   Neurological:      Mental Status: He is alert and oriented to person, place, and time.      GCS: GCS eye subscore is 4. GCS verbal subscore is 5. GCS motor subscore is 6.      Cranial Nerves: No cranial nerve deficit.      Sensory: No sensory deficit.   Psychiatric:         Speech: Speech normal.         Behavior: Behavior normal.         Thought Content: Thought content normal.         Judgment: Judgment normal.         Procedures            ED Course                                             Medical Decision Making  Problems Addressed:  Cellulitis of right foot: complicated acute illness or injury    Amount and/or Complexity of Data Reviewed  Labs: ordered.    Risk  OTC drugs.  Prescription drug management.        Final diagnoses:   Cellulitis of right foot       ED Disposition  ED Disposition       ED Disposition   Discharge    Condition   Stable    Comment   --               Bruna Saravia, APRN  [address] [place] [address]  [phone]    Schedule an appointment as soon as possible for a visit in 1 day  EVALUATE         Medication List        New Prescriptions      amoxicillin-clavulanate 875-125 MG per tablet  Commonly known as: AUGMENTIN  Take 1 tablet by mouth 2 (Two) Times a Day for 10 days.     doxycycline 100 MG capsule  Commonly known as: MONODOX  Take 1 capsule by mouth 2 (Two) Times a Day for 10 days.               Where to Get Your Medications        These medications were sent to [id number] - [place], [place] - [address] [address] - [phone]  - [phone]   [address] [address]      Phone: [phone]   amoxicillin-clavulanate 875-125 MG per tablet  doxycycline 100 MG capsule            Collin Manriquez MD  04/13/24 [id number]

## 2024-04-15 ENCOUNTER — SPECIALTY PHARMACY (OUTPATIENT)
Dept: PHARMACY | Facility: HOSPITAL | Age: 74
End: 2024-04-15
Payer: MEDICARE

## 2024-04-15 ENCOUNTER — OFFICE VISIT (OUTPATIENT)
Dept: ENDOCRINOLOGY | Facility: CLINIC | Age: 74
End: 2024-04-15
Payer: OTHER GOVERNMENT

## 2024-04-15 VITALS
HEIGHT: 72 IN | DIASTOLIC BLOOD PRESSURE: 67 MMHG | WEIGHT: 159.4 LBS | HEART RATE: 68 BPM | SYSTOLIC BLOOD PRESSURE: 127 MMHG | OXYGEN SATURATION: 99 % | BODY MASS INDEX: 21.59 KG/M2

## 2024-04-15 DIAGNOSIS — E11.65 TYPE 2 DIABETES MELLITUS WITH HYPERGLYCEMIA, UNSPECIFIED WHETHER LONG TERM INSULIN USE: Primary | ICD-10-CM

## 2024-04-15 LAB
ALBUMIN SERPL-MCNC: 4.2 G/DL (ref 3.5–5.2)
ALBUMIN UR-MCNC: 4.1 MG/DL
ALBUMIN/GLOB SERPL: 1.5 G/DL
ALP SERPL-CCNC: 78 U/L (ref 39–117)
ALT SERPL W P-5'-P-CCNC: 27 U/L (ref 1–41)
ANION GAP SERPL CALCULATED.3IONS-SCNC: 8 MMOL/L (ref 5–15)
AST SERPL-CCNC: 26 U/L (ref 1–40)
BILIRUB SERPL-MCNC: 1 MG/DL (ref 0–1.2)
BUN SERPL-MCNC: 17 MG/DL (ref 8–23)
BUN/CREAT SERPL: 17.2 (ref 7–25)
CALCIUM SPEC-SCNC: 9.5 MG/DL (ref 8.6–10.5)
CHLORIDE SERPL-SCNC: 101 MMOL/L (ref 98–107)
CHOLEST SERPL-MCNC: 104 MG/DL (ref 0–200)
CO2 SERPL-SCNC: 28 MMOL/L (ref 22–29)
CREAT SERPL-MCNC: 0.99 MG/DL (ref 0.76–1.27)
CREAT UR-MCNC: 70.2 MG/DL
EGFRCR SERPLBLD CKD-EPI 2021: 79.9 ML/MIN/1.73
EXPIRATION DATE: ABNORMAL
GLOBULIN UR ELPH-MCNC: 2.8 GM/DL
GLUCOSE BLDC GLUCOMTR-MCNC: 375 MG/DL (ref 70–130)
GLUCOSE SERPL-MCNC: 334 MG/DL (ref 65–99)
HBA1C MFR BLD: 12.5 % (ref 4.5–5.7)
HDLC SERPL-MCNC: 55 MG/DL (ref 40–60)
LDLC SERPL CALC-MCNC: 35 MG/DL (ref 0–100)
LDLC/HDLC SERPL: 0.67 {RATIO}
Lab: ABNORMAL
MICROALBUMIN/CREAT UR: 58.4 MG/G (ref 0–29)
POTASSIUM SERPL-SCNC: 4.1 MMOL/L (ref 3.5–5.2)
PROT SERPL-MCNC: 7 G/DL (ref 6–8.5)
SODIUM SERPL-SCNC: 137 MMOL/L (ref 136–145)
T4 FREE SERPL-MCNC: 1.52 NG/DL (ref 0.93–1.7)
TRIGL SERPL-MCNC: 62 MG/DL (ref 0–150)
TSH SERPL DL<=0.05 MIU/L-ACNC: 0.62 UIU/ML (ref 0.27–4.2)
VLDLC SERPL-MCNC: 14 MG/DL (ref 5–40)

## 2024-04-15 PROCEDURE — 84443 ASSAY THYROID STIM HORMONE: CPT | Performed by: NURSE PRACTITIONER

## 2024-04-15 PROCEDURE — 83036 HEMOGLOBIN GLYCOSYLATED A1C: CPT | Performed by: NURSE PRACTITIONER

## 2024-04-15 PROCEDURE — 86341 ISLET CELL ANTIBODY: CPT | Performed by: NURSE PRACTITIONER

## 2024-04-15 PROCEDURE — 80053 COMPREHEN METABOLIC PANEL: CPT | Performed by: NURSE PRACTITIONER

## 2024-04-15 PROCEDURE — 82570 ASSAY OF URINE CREATININE: CPT | Performed by: NURSE PRACTITIONER

## 2024-04-15 PROCEDURE — 84439 ASSAY OF FREE THYROXINE: CPT | Performed by: NURSE PRACTITIONER

## 2024-04-15 PROCEDURE — 99214 OFFICE O/P EST MOD 30 MIN: CPT | Performed by: NURSE PRACTITIONER

## 2024-04-15 PROCEDURE — 82043 UR ALBUMIN QUANTITATIVE: CPT | Performed by: NURSE PRACTITIONER

## 2024-04-15 PROCEDURE — 82947 ASSAY GLUCOSE BLOOD QUANT: CPT | Performed by: NURSE PRACTITIONER

## 2024-04-15 PROCEDURE — 84681 ASSAY OF C-PEPTIDE: CPT | Performed by: NURSE PRACTITIONER

## 2024-04-15 PROCEDURE — 80061 LIPID PANEL: CPT | Performed by: NURSE PRACTITIONER

## 2024-04-15 NOTE — PROGRESS NOTES
"   Specialty Pharmacy Patient Management Program  Endocrinology Initial Assessment       Giorgio Omer is a 73 y.o. male with Type 2 Diabetes seen by an Endocrinology provider and enrolled in the Endocrinology Patient Management program offered by [facility].  An initial outreach was conducted, including assessment of therapy appropriateness and specialty medication education for insulin therapy. The patient was introduced to services offered by [facility], including: regular assessments, refill coordination, curbside pick-up or mail order delivery options, prior authorization maintenance, and financial assistance programs as applicable. The patient was also provided with contact information for the pharmacy team.     Patient was diagnosed in 2001. He is currently taking Huamlog 6 units TID with meals and Lantus 15 units at night to control blood sugar. Patient's son is present in clinic today and reports that patient has dementia and \"hardly ever takes his Lantus\". He also reports mixing up injections and dosing times. He is interested in starting on insulin pump therapy today.     He checks BG once daily with readings between 200-400s. Patient reports rare low BG <70 (once every 2-3 months).     Complications include: h/o MI, stent placement, HTN & HLD (controlled with medication)     Patient denies personal/family history of thyroid cancer, denies history of pancreatitis, denies history of gastroparesis and denies issues with recurrent UTI/yeast infections.     In the past, the patient has tried:     Drug Dose Reason for Discontinuation Notes   Byetta  Therapy change    Metformin  D/C at hospitalizations                  Insurance Coverage & Financial Support  None      Relevant Past Medical History and Comorbidities  Relevant medical history and concomitant health conditions were discussed with the patient. The patient's chart has been reviewed for relevant past " medical history and comorbid health conditions and updated as necessary.   Past Medical History:   Diagnosis Date    Arthritis     Coronary artery disease 10/17/2016    Surgical revascularization, Dr. Bryant, , [facility], receiving LIMA to LAD, free radial graft to PDA, saphenous graft to first diagonal, saphenous graft to obtuse marginal. MPS, May 2008, per [facility]: Normal perfusion, EF 68%.  2012 - exercise Cardiolite WNL, EF preserved.    Diabetes mellitus 10/17/2016    on Metformin times one year, onset :  2015.  HG A1c 8.6.     Dyslipidemia 10/17/2016    2015:  Total cholesterol 143, triglycerides 326, HDL 39, LDL 38.    Hyperlipidemia     Hypertension 10/17/2016    presumed essential.     Right bundle branch block 10/17/2016    baseline     Social History     Socioeconomic History    Marital status:    Tobacco Use    Smoking status: Former     Current packs/day: 0.00     Types: Cigarettes     Quit date: 10/28/2001     Years since quittin.4    Smokeless tobacco: Never   Vaping Use    Vaping status: Never Used   Substance and Sexual Activity    Alcohol use: No    Drug use: No    Sexual activity: Defer       Problem list reviewed by Raquel Lara RPH on 4/15/2024 at  8:13 AM    Allergies  Known allergies and reactions were discussed with the patient. The patient's chart has been reviewed for  allergy information and updated as necessary.   Allergies   Allergen Reactions    Cardizem [Diltiazem Hcl] Rash    Celecoxib Rash       Allergies reviewed by Raquel Lara RPH on 4/15/2024 at  8:12 AM    Relevant Laboratory Values  A1C Last 3 Results          2023    08:41 4/15/2024    08:18   HGBA1C Last 3 Results   Hemoglobin A1C 10.20  12.5      Lab Results   Component Value Date    HGBA1C 12.5 (A) 04/15/2024     Lab Results   Component Value Date    GLUCOSE 334 (H) 04/15/2024    CALCIUM 9.5 04/15/2024     04/15/2024    K 4.1 04/15/2024    CO2 28.0 04/15/2024      04/15/2024    BUN 17 04/15/2024    CREATININE 0.99 04/15/2024    BCR 17.2 04/15/2024    ANIONGAP 8.0 04/15/2024     Lab Results   Component Value Date    CHOL 104 04/15/2024    TRIG 62 04/15/2024    HDL 55 04/15/2024    LDL 35 04/15/2024         Current Medication List  This medication list has been reviewed with the patient and evaluated for any interactions or necessary modifications/recommendations, and updated to include all prescription medications, OTC medications, and supplements the patient is currently taking.  This list reflects what is contained in the patient's profile, which has also been marked as reviewed to communicate to other providers it is the most up to date version of the patient's current medication therapy.     Current Outpatient Medications:     acetaminophen (TYLENOL) 325 MG tablet, Take 2 tablets by mouth Every 4 (Four) Hours As Needed for Mild Pain or Fever (temperature greater than 101F)., Disp: , Rfl:     amoxicillin-clavulanate (AUGMENTIN) 875-125 MG per tablet, Take 1 tablet by mouth 2 (Two) Times a Day for 10 days., Disp: 20 tablet, Rfl: 0    aspirin 81 MG chewable tablet, Chew 1 tablet Daily., Disp: 90 tablet, Rfl: 3    clopidogrel (PLAVIX) 75 MG tablet, Take 1 tablet by mouth Daily., Disp: 90 tablet, Rfl: 3    donepezil (ARICEPT) 5 MG tablet, Take 1 tablet by mouth Every Night. Additional refills per PCP (Patient taking differently: Take 2 tablets by mouth Every Night. Additional refills per PCP), Disp: 30 tablet, Rfl: 5    doxycycline (MONODOX) 100 MG capsule, Take 1 capsule by mouth 2 (Two) Times a Day for 10 days., Disp: 20 capsule, Rfl: 0    insulin glargine (LANTUS, SEMGLEE) 100 UNIT/ML injection, Inject 15 Units under the skin into the appropriate area as directed Every Night., Disp: , Rfl:     Insulin Lispro, 1 Unit Dial, (HUMALOG) 100 UNIT/ML solution pen-injector, Inject 6 Units under the skin into the appropriate area as directed 3 (Three) Times a Day Before Meals., Disp:  6 mL, Rfl: 5    Insulin Pen Needle (Pen Needles) 32G X 4 MM misc, Use 1 each 3 (Three) Times a Day Before Meals., Disp: 100 each, Rfl: 5    losartan (COZAAR) 25 MG tablet, Take 1 tablet by mouth Daily., Disp: 90 tablet, Rfl: 0    metoprolol tartrate (LOPRESSOR) 25 MG tablet, Take 0.5 tablets by mouth Every 12 (Twelve) Hours., Disp: 30 tablet, Rfl: 5    Omega-3 1000 MG capsule, Take 1 capsule by mouth Daily., Disp: , Rfl:     rosuvastatin (CRESTOR) 40 MG tablet, Take 1 tablet by mouth Every Night., Disp: 90 tablet, Rfl: 3    zinc sulfate (ZINCATE) 220 (50 Zn) MG capsule, Take 1 capsule by mouth Daily., Disp: , Rfl:   No current facility-administered medications for this visit.    Medicines reviewed by Raquel Lara [facility] on 4/15/2024 at  8:20 AM    Drug Interactions  No significant drug-drug interactions with diabetes medications expected according to literature.  The hypoglycemic effect of Insulin may be increased or prolonged by metoprolol.   Aspirin + clopidogrel increases risk of bleeding. Pt denies any signs/symptoms of bleeding at this time.     Recommended Medications Assessment  Aspirin -  Currently Taking   Statin -  Currently Taking   ACEi/ARB - Currently Taking     Current 10-Year ASCVD Risk: pt has clinical ASCVD    Adherence and Self-Administration  Adherence related to the patient's specialty therapy was discussed with the patient. The Adherence segment of this outreach has been reviewed and updated.   Is there a concern with patient's ability to self administer the medication correctly and without issue?: No  Were any potential barriers to adherence identified during the initial assessment or patient education?: Yes (Pt has dementia)  Are there any concerns regarding the patient's understanding of the importance of medication adherence?: No    Barriers to Patient Adherence and/or Self-Administration: Patient has dementia and often forgets to take his medications   Methods for Supporting Patient  Adherence and/or Self-Administration: Family members help care for patient    Goals of Therapy  Goals related to the patient's specialty therapy were discussed with the patient. The Patient Goals segment of this outreach has been reviewed and updated.    Goals Addressed Today        Consistently take medications as prescribed      HEMOGLOBIN A1C < 7      Specialty Pharmacy General Goal      Prevent hypoglycemia              Reassessment Plan & Follow-Up  Patient's diabetes is uncontrolled with A1C of 12.5%.  Medication Therapy Changes:  None discussed with patient   Related Plans, Therapy Recommendations or Therapy Problems to Be Addressed: Patient is interested in starting on insulin pump therapy. This would be beneficial as patient has issues with adherence.      Patient does not wish to use [facility] at this time due to: pharmacy not contracted with his insurance. He uses [facility].     Attestation  I attest the patient was actively involved in and has agreed to the above plan of care. If the prescribed therapy is at any point deemed not appropriate based on the current or future assessments, a consultation will be initiated with the patient's specialty care provider to determine the best course of action. The revised plan of therapy will be documented along with any required assessments and/or additional patient education provided.    Raquel Lara, PharmD, RADHA AGEE  Clinical Specialty Pharmacist, Endocrinology  4/15/2024  10:06 EDT

## 2024-04-15 NOTE — PROGRESS NOTES
Chief Complaint   Patient presents with    Diabetes        Referring Provider  Bruna Saravia*     HPI   Giorgio SAVAGE Kan is a 73 y.o. male had concerns including Diabetes.    Seen as a new patient.  T2DM.    Here with his son today reports that he has dementia and has been missing multiple doses of his medication to this.    Diabetes:  Diabetes was diagnosed 2001, had a heart attack and was noted to be DM during the hospital stay.  Complications include heart attack-2001, no others.  Last ophtho exam was 2024.  Current medications for diabetes include Lantus 15 units QHS, Humalog 5 units TID with meals.  He checks his blood sugar 1 time per day.   Hypos: none    ACE/ARB: yes, Statin: yes  Labs:  Lipid Panel: due  DREA: due    The following portions of the patient's history were reviewed and updated as appropriate: allergies, current medications, past family history, past medical history, past social history, past surgical history, and problem list.    Diet: he doesn't watch his diet much    Past Medical History:   Diagnosis Date    Arthritis     Coronary artery disease 10/17/2016    Surgical revascularization, Dr. Bryant, 07/01, [facility], receiving LIMA to LAD, free radial graft to PDA, saphenous graft to first diagonal, saphenous graft to obtuse marginal. MPS, May 2008, per [facility]: Normal perfusion, EF 68%.  April 2012 - exercise Cardiolite WNL, EF preserved.    Diabetes mellitus 10/17/2016    on Metformin times one year, onset 2009:  March 2015.  HG A1c 8.6.     Dyslipidemia 10/17/2016    March 2015:  Total cholesterol 143, triglycerides 326, HDL 39, LDL 38.    Hyperlipidemia     Hypertension 10/17/2016    presumed essential.     Right bundle branch block 10/17/2016    baseline     Past Surgical History:   Procedure Laterality Date    CARDIAC CATHETERIZATION N/A 08/26/2022    Procedure: LEFT HEART CATH;  Surgeon: Sriram Bowers MD;  Location: [facility] CATH INVASIVE LOCATION;  Service: Cardiovascular;   Laterality: N/A;    CARDIAC CATHETERIZATION N/A 2023    Procedure: Coronary angiography;  Surgeon: Fabiano Duran MD;  Location:  [facility] CATH INVASIVE LOCATION;  Service: Cardiology;  Laterality: N/A;    CARDIAC CATHETERIZATION N/A 2023    Procedure: Percutaneous Coronary Intervention;  Surgeon: Sriram Bowers MD;  Location:  [facility] CATH INVASIVE LOCATION;  Service: Cardiovascular;  Laterality: N/A;    CIRCUMCISION N/A 2023    Procedure: CIRCUMCISION;  Surgeon: Himanshu Espinoza MD;  Location:  [facility] OR;  Service: Urology;  Laterality: N/A;    COLONOSCOPY      CORONARY ARTERY BYPASS GRAFT        Family History   Problem Relation Age of Onset    Ovarian cancer Mother     Hypertension Father     Stroke Father       Social History     Socioeconomic History    Marital status:    Tobacco Use    Smoking status: Former     Current packs/day: 0.00     Types: Cigarettes     Quit date: 10/28/2001     Years since quittin.4    Smokeless tobacco: Never   Vaping Use    Vaping status: Never Used   Substance and Sexual Activity    Alcohol use: No    Drug use: No    Sexual activity: Defer      Allergies   Allergen Reactions    Cardizem [Diltiazem Hcl] Rash    Celecoxib Rash      Current Outpatient Medications on File Prior to Visit   Medication Sig Dispense Refill    acetaminophen (TYLENOL) 325 MG tablet Take 2 tablets by mouth Every 4 (Four) Hours As Needed for Mild Pain or Fever (temperature greater than 101F).      amoxicillin-clavulanate (AUGMENTIN) 875-125 MG per tablet Take 1 tablet by mouth 2 (Two) Times a Day for 10 days. 20 tablet 0    aspirin 81 MG chewable tablet Chew 1 tablet Daily. 90 tablet 3    clopidogrel (PLAVIX) 75 MG tablet Take 1 tablet by mouth Daily. 90 tablet 3    donepezil (ARICEPT) 5 MG tablet Take 1 tablet by mouth Every Night. Additional refills per PCP (Patient taking differently: Take 2 tablets by mouth Every Night. Additional refills per PCP) 30 tablet 5     "doxycycline (MONODOX) 100 MG capsule Take 1 capsule by mouth 2 (Two) Times a Day for 10 days. 20 capsule 0    insulin glargine (LANTUS, SEMGLEE) 100 UNIT/ML injection Inject 15 Units under the skin into the appropriate area as directed Every Night.      Insulin Lispro, 1 Unit Dial, (HUMALOG) 100 UNIT/ML solution pen-injector Inject 6 Units under the skin into the appropriate area as directed 3 (Three) Times a Day Before Meals. 6 mL 5    Insulin Pen Needle (Pen Needles) 32G X 4 MM misc Use 1 each 3 (Three) Times a Day Before Meals. 100 each 5    losartan (COZAAR) 25 MG tablet Take 1 tablet by mouth Daily. 90 tablet 0    metoprolol tartrate (LOPRESSOR) 25 MG tablet Take 0.5 tablets by mouth Every 12 (Twelve) Hours. 30 tablet 5    Omega-3 1000 MG capsule Take 1 capsule by mouth Daily.      rosuvastatin (CRESTOR) 40 MG tablet Take 1 tablet by mouth Every Night. 90 tablet 3    zinc sulfate (ZINCATE) 220 (50 Zn) MG capsule Take 1 capsule by mouth Daily.      [DISCONTINUED] Misc Natural Products (PROSTATE HEALTH PO) Take 1 tablet by mouth As Needed.       No current facility-administered medications on file prior to visit.        Review of Systems   Constitutional:  Positive for fatigue and unexpected weight loss.   Eyes:  Positive for blurred vision.   Endocrine: Positive for polydipsia, polyphagia and polyuria.   Neurological:  Positive for memory problem.   Psychiatric/Behavioral:  Positive for sleep disturbance.    All other systems reviewed and are negative.    /67 (BP Location: Right arm, Patient Position: Sitting, Cuff Size: Adult)   Pulse 68   Ht 182.9 cm (72\")   Wt 72.3 kg (159 lb 6.4 oz)   SpO2 99%   BMI 21.62 kg/m²      Physical Exam  Vitals reviewed.   Constitutional:       Appearance: Normal appearance.      Comments: Thin appearing   Eyes:      Extraocular Movements: Extraocular movements intact.   Cardiovascular:      Rate and Rhythm: Normal rate.      Pulses:           Dorsalis pedis pulses are " "1+ on the right side and 1+ on the left side.        Posterior tibial pulses are 1+ on the right side and 1+ on the left side.   Pulmonary:      Effort: Pulmonary effort is normal.   Feet:      Right foot:      Protective Sensation: 10 sites tested.  4 sites sensed.      Skin integrity: Callus and dry skin present.      Toenail Condition: Right toenails are abnormally thick.      Left foot:      Protective Sensation: 10 sites tested.  4 sites sensed.      Skin integrity: Callus and dry skin present.      Toenail Condition: Left toenails are abnormally thick.      Comments: Diabetic Foot Exam Performed and Monofilament Test Performed  Patient has redness to his right lower extremity noted to be cellulitis that is improving.  Mild redness and edema noted, no warmth present.   Skin:     General: Skin is warm.   Neurological:      Mental Status: He is alert. Mental status is at baseline.      Comments: Patient has underlying cognition impairment.   Psychiatric:         Mood and Affect: Mood normal.         Behavior: Behavior normal.         Thought Content: Thought content normal.         Judgment: Judgment normal.       CMP:  Lab Results   Component Value Date    BUN 17 04/15/2024    CREATININE 0.99 04/15/2024    BCR 17.2 04/15/2024     04/15/2024    K 4.1 04/15/2024    CO2 28.0 04/15/2024    CALCIUM 9.5 04/15/2024    ALBUMIN 4.2 04/15/2024    BILITOT 1.0 04/15/2024    ALKPHOS 78 04/15/2024    AST 26 04/15/2024    ALT 27 04/15/2024     Lipid Panel:  Lab Results   Component Value Date    CHOL 104 04/15/2024    TRIG 62 04/15/2024    HDL 55 04/15/2024    VLDL 14 04/15/2024    LDL 35 04/15/2024     HbA1c:  Lab Results   Component Value Date    HGBA1C 12.5 (A) 04/15/2024    HGBA1C 10.20 (H) 08/26/2023     Glucose:  Lab Results   Component Value Date    POCGLU 375 (A) 04/15/2024     Microalbumin:  No results found for: \"MALBCRERATIO\"  TSH:  Lab Results   Component Value Date    TSH 0.616 04/15/2024       Assessment and " Plan    Diagnoses and all orders for this visit:    1. Type 2 diabetes mellitus with hyperglycemia, unspecified whether long term insulin use (Primary)  Assessment & Plan:  -Diabetes is above goal with A1c 12.5.  -Discussed dietary and exercise guidelines with patient and family.  -Discussed the importance of yearly eye exams.  -Discussed the importance of checking BG's regularly.    -Discussed use of insulin pump with patient and family as they are requesting.  They feel like this would be the most efficient for this patient.  They feel that this would be the best option with his underlying cognition impairment.  -Increase Lantus to 25 units nightly.  -Continue Humalog 6 units 3 times daily with meals.  Discussed the importance of taking insulin regularly without missing doses.  Informed family that they can make a chart for patient to check off every day once he has taken his injection.  Will obtain labs and send order form for patient to have a Medtronic 780 G pump with missed meal detection.  Will be the most efficient pump for his condition.  -S/S hypoglycemia reviewed with Rule of 15's advised.  -Follow-up in 1 month.     Orders:  -     POC Glycosylated Hemoglobin (Hb A1C)  -     POC Glucose, Blood  -     C-Peptide  -     Cancel: Glucose, Fasting  -     Glutamic Acid Decarboxylase  -     Anti-islet Cell Antibody  -     IA-2 Autoantibodies  -     Comprehensive Metabolic Panel  -     Lipid Panel  -     Microalbumin / Creatinine Urine Ratio - Urine, Clean Catch  -     TSH  -     T4, free         Return in about 3 months (around 7/15/2024) for Follow-up appointment, A1C. The patient was instructed to contact the clinic with any interval questions or concerns.        This document has been electronically signed by ZARINA Ng  April 15, 2024 11:45 EDT   Endocrinology    Please note that portions of this document were completed with a voice recognition program. Efforts were made to edit the dictations,  but occasionally words are mis-transcribed.

## 2024-04-15 NOTE — ASSESSMENT & PLAN NOTE
-Diabetes is above goal with A1c 12.5.  -Discussed dietary and exercise guidelines with patient and family.  -Discussed the importance of yearly eye exams.  -Discussed the importance of checking BG's regularly.    -Discussed use of insulin pump with patient and family as they are requesting.  They feel like this would be the most efficient for this patient.  They feel that this would be the best option with his underlying cognition impairment.  -Increase Lantus to 25 units nightly.  -Continue Humalog 6 units 3 times daily with meals.  Discussed the importance of taking insulin regularly without missing doses.  Informed family that they can make a chart for patient to check off every day once he has taken his injection.  Will obtain labs and send order form for patient to have a Medtronic 780 G pump with missed meal detection.  Will be the most efficient pump for his condition.  -S/S hypoglycemia reviewed with Rule of 15's advised.  -Follow-up in 1 month.

## 2024-04-16 LAB — C PEPTIDE SERPL-MCNC: 2.2 NG/ML (ref 1.1–4.4)

## 2024-04-17 LAB — PANC ISLET CELL AB TITR SER: NEGATIVE {TITER}

## 2024-04-21 LAB — ISLET CELL512 AB SER-ACNC: <7.5 U/ML

## 2024-04-22 LAB — GAD65 AB SER IA-ACNC: <5 U/ML (ref 0–5)

## 2024-05-15 ENCOUNTER — OFFICE VISIT (OUTPATIENT)
Dept: ENDOCRINOLOGY | Facility: CLINIC | Age: 74
End: 2024-05-15
Payer: OTHER GOVERNMENT

## 2024-05-15 VITALS
SYSTOLIC BLOOD PRESSURE: 119 MMHG | OXYGEN SATURATION: 99 % | DIASTOLIC BLOOD PRESSURE: 73 MMHG | WEIGHT: 161.6 LBS | BODY MASS INDEX: 21.89 KG/M2 | HEIGHT: 72 IN | HEART RATE: 67 BPM

## 2024-05-15 DIAGNOSIS — E11.65 TYPE 2 DIABETES MELLITUS WITH HYPERGLYCEMIA, UNSPECIFIED WHETHER LONG TERM INSULIN USE: Primary | ICD-10-CM

## 2024-05-15 LAB — GLUCOSE BLDC GLUCOMTR-MCNC: 247 MG/DL (ref 70–130)

## 2024-05-15 RX ORDER — DULOXETIN HYDROCHLORIDE 20 MG/1
20 CAPSULE, DELAYED RELEASE ORAL DAILY
Qty: 30 CAPSULE | Refills: 2 | Status: SHIPPED | OUTPATIENT
Start: 2024-05-15 | End: 2025-05-15

## 2024-05-15 RX ORDER — INSULIN LISPRO 100 [IU]/ML
6 INJECTION, SOLUTION INTRAVENOUS; SUBCUTANEOUS
Qty: 6 ML | Refills: 5 | Status: SHIPPED | OUTPATIENT
Start: 2024-05-15 | End: 2024-05-16

## 2024-05-15 NOTE — PROGRESS NOTES
Chief Complaint   Patient presents with    Diabetes        Referring Provider  No ref. provider found     HPI   Giorgio Omer is a 73 y.o. male had concerns including Diabetes.    T2DM.    Here with his son today reports that he has dementia.  He has been doing better since his last visit.  He has been taking his medication more regularly.  He is scheduled to get his insulin pump started soon.    Diabetes:  Diabetes was diagnosed 2001, had a heart attack and was noted to be DM during the hospital stay.  Complications include heart attack-2001, no others.  Last ophtho exam was 2024.  Current medications for diabetes include Lantus 25 units QHS, Humalog 6 units TID with meals.  He checks his blood sugar 1 time per day.   Hypos: none    ACE/ARB: yes, Statin: yes  Labs:  Lipid Panel: due  DREA: due    The following portions of the patient's history were reviewed and updated as appropriate: allergies, current medications, past family history, past medical history, past social history, past surgical history, and problem list.    Diet: he doesn't watch his diet much    Past Medical History:   Diagnosis Date    Arthritis     Coronary artery disease 10/17/2016    Surgical revascularization, Dr. Bryant, 07/01, [facility], receiving LIMA to LAD, free radial graft to PDA, saphenous graft to first diagonal, saphenous graft to obtuse marginal. MPS, May 2008, per [facility]: Normal perfusion, EF 68%.  April 2012 - exercise Cardiolite WNL, EF preserved.    Diabetes mellitus 10/17/2016    on Metformin times one year, onset 2009:  March 2015.  HG A1c 8.6.     Dyslipidemia 10/17/2016    March 2015:  Total cholesterol 143, triglycerides 326, HDL 39, LDL 38.    Hyperlipidemia     Hypertension 10/17/2016    presumed essential.     Right bundle branch block 10/17/2016    baseline     Past Surgical History:   Procedure Laterality Date    CARDIAC CATHETERIZATION N/A 08/26/2022    Procedure: LEFT HEART CATH;  Surgeon: Sriram Bowers MD;  Location:   [facility];  Service: Cardiovascular;  Laterality: N/A;    CARDIAC CATHETERIZATION N/A 2023    Procedure: Coronary angiography;  Surgeon: Fabiano Duran MD;  Location:  [facility];  Service: Cardiology;  Laterality: N/A;    CARDIAC CATHETERIZATION N/A 2023    Procedure: Percutaneous Coronary Intervention;  Surgeon: Sriram Bowers MD;  Location:  [facility];  Service: Cardiovascular;  Laterality: N/A;    CIRCUMCISION N/A 2023    Procedure: CIRCUMCISION;  Surgeon: Himanshu Espinoza MD;  Location:  [facility];  Service: Urology;  Laterality: N/A;    COLONOSCOPY      CORONARY ARTERY BYPASS GRAFT        Family History   Problem Relation Age of Onset    Ovarian cancer Mother     Hypertension Father     Stroke Father       Social History     Socioeconomic History    Marital status:    Tobacco Use    Smoking status: Former     Current packs/day: 0.00     Types: Cigarettes     Quit date: 10/28/2001     Years since quittin.5    Smokeless tobacco: Never   Vaping Use    Vaping status: Never Used   Substance and Sexual Activity    Alcohol use: No    Drug use: No    Sexual activity: Defer      Allergies   Allergen Reactions    Cardizem [Diltiazem Hcl] Rash    Celecoxib Rash      Current Outpatient Medications on File Prior to Visit   Medication Sig Dispense Refill    acetaminophen (TYLENOL) 325 MG tablet Take 2 tablets by mouth Every 4 (Four) Hours As Needed for Mild Pain or Fever (temperature greater than 101F).      aspirin 81 MG chewable tablet Chew 1 tablet Daily. 90 tablet 3    clopidogrel (PLAVIX) 75 MG tablet Take 1 tablet by mouth Daily. 90 tablet 3    donepezil (ARICEPT) 5 MG tablet Take 1 tablet by mouth Every Night. Additional refills per PCP (Patient taking differently: Take 2 tablets by mouth Every Night. Additional refills per PCP) 30 tablet 5    insulin glargine (LANTUS, SEMGLEE) 100 UNIT/ML injection Inject 15 Units under  "the skin into the appropriate area as directed Every Night.      Insulin Pen Needle (Pen Needles) 32G X 4 MM misc Use 1 each 3 (Three) Times a Day Before Meals. 100 each 5    losartan (COZAAR) 25 MG tablet Take 1 tablet by mouth Daily. 90 tablet 0    metoprolol tartrate (LOPRESSOR) 25 MG tablet Take 0.5 tablets by mouth Every 12 (Twelve) Hours. 30 tablet 5    Omega-3 1000 MG capsule Take 1 capsule by mouth Daily.      rosuvastatin (CRESTOR) 40 MG tablet Take 1 tablet by mouth Every Night. 90 tablet 3    zinc sulfate (ZINCATE) 220 (50 Zn) MG capsule Take 1 capsule by mouth Daily.      [DISCONTINUED] Insulin Lispro, 1 Unit Dial, (HUMALOG) 100 UNIT/ML solution pen-injector Inject 6 Units under the skin into the appropriate area as directed 3 (Three) Times a Day Before Meals. 6 mL 5     No current facility-administered medications on file prior to visit.        Review of Systems   Constitutional:  Positive for fatigue and unexpected weight loss.   Eyes:  Positive for blurred vision.   Endocrine: Positive for polydipsia, polyphagia and polyuria.   Neurological:  Positive for memory problem.   Psychiatric/Behavioral:  Positive for sleep disturbance.    All other systems reviewed and are negative.    /73 (BP Location: Right arm, Patient Position: Sitting, Cuff Size: Adult)   Pulse 67   Ht 182.9 cm (72\")   Wt 73.3 kg (161 lb 9.6 oz)   SpO2 99%   BMI 21.92 kg/m²      Physical Exam  Vitals reviewed.   Constitutional:       Appearance: Normal appearance.      Comments: Thin appearing   Eyes:      Extraocular Movements: Extraocular movements intact.   Cardiovascular:      Rate and Rhythm: Normal rate.   Pulmonary:      Effort: Pulmonary effort is normal.   Feet:      Comments: Mild redness and discomfort noted to the right lower extremity.  Skin:     General: Skin is warm.   Neurological:      Mental Status: He is alert. Mental status is at baseline.      Comments: Patient has underlying cognition impairment. "   Psychiatric:         Mood and Affect: Mood normal.         Behavior: Behavior normal.         Thought Content: Thought content normal.         Judgment: Judgment normal.       CMP:  Lab Results   Component Value Date    BUN 17 04/15/2024    CREATININE 0.99 04/15/2024    BCR 17.2 04/15/2024     04/15/2024    K 4.1 04/15/2024    CO2 28.0 04/15/2024    CALCIUM 9.5 04/15/2024    ALBUMIN 4.2 04/15/2024    BILITOT 1.0 04/15/2024    ALKPHOS 78 04/15/2024    AST 26 04/15/2024    ALT 27 04/15/2024     Lipid Panel:  Lab Results   Component Value Date    CHOL 104 04/15/2024    TRIG 62 04/15/2024    HDL 55 04/15/2024    VLDL 14 04/15/2024    LDL 35 04/15/2024     HbA1c:  Lab Results   Component Value Date    HGBA1C 12.5 (A) 04/15/2024    HGBA1C 10.20 (H) 08/26/2023     Glucose:  Lab Results   Component Value Date    POCGLU 247 (A) 05/15/2024     Microalbumin:  Lab Results   Component Value Date    MALBCRERATIO 58.4 (H) 04/15/2024     TSH:  Lab Results   Component Value Date    TSH 0.616 04/15/2024       Assessment and Plan    Diagnoses and all orders for this visit:    1. Type 2 diabetes mellitus with hyperglycemia, unspecified whether long term insulin use (Primary)  Assessment & Plan:  -Diabetes is unchanged.  -Discussed dietary and exercise guidelines with patient and family.  -Discussed the importance of yearly eye exams.  -Discussed the importance of checking BG's regularly.    -Continue with getting started on insulin pump.  -Continue Lantus 25 units nightly.  -Continue Humalog 6 units 3 times daily with meals.  Discussed the importance of taking insulin regularly without missing doses.  Informed family that they can make a chart for patient to check off every day once he has taken his injection.  Will obtain labs and send order form for patient to have a [employer] 780 G pump with missed meal detection.  Will be the most efficient pump for his condition.  -S/S hypoglycemia reviewed with Rule of 15's  advised.  -Follow-up in 2 months.     Orders:  -     POC Glucose, Blood    Other orders  -     Insulin Lispro, 1 Unit Dial, (HUMALOG) 100 UNIT/ML solution pen-injector; Inject 6 Units under the skin into the appropriate area as directed 3 (Three) Times a Day Before Meals.  Dispense: 6 mL; Refill: 5  -     DULoxetine (Cymbalta) 20 MG capsule; Take 1 capsule by mouth Daily.  Dispense: 30 capsule; Refill: 2           Return in about 2 months (around 7/15/2024) for Follow-up appointment, A1C. The patient was instructed to contact the clinic with any interval questions or concerns.        This document has been electronically signed by ZARINA Ng  May 15, 2024 09:32 EDT   Endocrinology    Please note that portions of this document were completed with a voice recognition program. Efforts were made to edit the dictations, but occasionally words are mis-transcribed.

## 2024-05-15 NOTE — ASSESSMENT & PLAN NOTE
-Diabetes is unchanged.  -Discussed dietary and exercise guidelines with patient and family.  -Discussed the importance of yearly eye exams.  -Discussed the importance of checking BG's regularly.    -Continue with getting started on insulin pump.  -Continue Lantus 25 units nightly.  -Continue Humalog 6 units 3 times daily with meals.  Discussed the importance of taking insulin regularly without missing doses.  Informed family that they can make a chart for patient to check off every day once he has taken his injection.  Will obtain labs and send order form for patient to have a [employer] 780 G pump with missed meal detection.  Will be the most efficient pump for his condition.  -S/S hypoglycemia reviewed with Rule of 15's advised.  -Follow-up in 2 months.

## 2024-05-16 RX ORDER — INSULIN LISPRO 100 [IU]/ML
INJECTION, SOLUTION INTRAVENOUS; SUBCUTANEOUS
Qty: 90 ML | Refills: 1 | Status: SHIPPED | OUTPATIENT
Start: 2024-05-16

## 2024-06-14 ENCOUNTER — OFFICE VISIT (OUTPATIENT)
Dept: ENDOCRINOLOGY | Facility: CLINIC | Age: 74
End: 2024-06-14
Payer: OTHER GOVERNMENT

## 2024-06-14 VITALS
OXYGEN SATURATION: 92 % | HEART RATE: 78 BPM | SYSTOLIC BLOOD PRESSURE: 139 MMHG | WEIGHT: 166.2 LBS | BODY MASS INDEX: 22.54 KG/M2 | DIASTOLIC BLOOD PRESSURE: 68 MMHG

## 2024-06-14 DIAGNOSIS — E11.65 TYPE 2 DIABETES MELLITUS WITH HYPERGLYCEMIA, WITH LONG-TERM CURRENT USE OF INSULIN: Primary | ICD-10-CM

## 2024-06-14 DIAGNOSIS — Z79.4 TYPE 2 DIABETES MELLITUS WITH HYPERGLYCEMIA, WITH LONG-TERM CURRENT USE OF INSULIN: Primary | ICD-10-CM

## 2024-06-14 NOTE — ASSESSMENT & PLAN NOTE
Discussed with patient and friend in office today that he will have to continue with injections at this time at his current dose.  Will reach out to [employer] rep to see if there is any solution for pump replacement.  Otherwise he will need to remain on injections at this time.  Follow-up at next scheduled appointment.

## 2024-06-14 NOTE — PROGRESS NOTES
Chief Complaint   Patient presents with    Follow-up        Referring Provider  Bruna Saravia*     HPI   Giorgio Omer is a 73 y.o. male had concerns including Follow-up.    T2DM.    He just went on vacation with his girlfriend. He stuck his insulin pump in a bag that was placed in the suitcase on the way home and have not been able to relocate it since they have been home.  He has been back on injections doing Lantus 10 units nightly and Humalog 6 units 3 times daily with meals.    Diabetes:  Diabetes was diagnosed 2001, had a heart attack and was noted to be DM during the hospital stay.  Complications include heart attack-2001, no others.  Last ophtho exam was 2024.  Current medications for diabetes include Lantus 25 units QHS, Humalog 6 units TID with meals.  He checks his blood sugar 1 time per day.   Hypos: none    ACE/ARB: yes, Statin: yes  Labs:  Lipid Panel: due  DREA: due    The following portions of the patient's history were reviewed and updated as appropriate: allergies, current medications, past family history, past medical history, past social history, past surgical history, and problem list.    Diet: he doesn't watch his diet much    Past Medical History:   Diagnosis Date    Arthritis     Coronary artery disease 10/17/2016    Surgical revascularization, Dr. Bryant, 07/01, [facility], receiving LIMA to LAD, free radial graft to PDA, saphenous graft to first diagonal, saphenous graft to obtuse marginal. MPS, May 2008, per [facility]: Normal perfusion, EF 68%.  April 2012 - exercise Cardiolite WNL, EF preserved.    Diabetes mellitus 10/17/2016    on Metformin times one year, onset 2009:  March 2015.  HG A1c 8.6.     Dyslipidemia 10/17/2016    March 2015:  Total cholesterol 143, triglycerides 326, HDL 39, LDL 38.    Hyperlipidemia     Hypertension 10/17/2016    presumed essential.     Right bundle branch block 10/17/2016    baseline     Past Surgical History:   Procedure Laterality Date    CARDIAC  CATHETERIZATION N/A 2022    Procedure: LEFT HEART CATH;  Surgeon: Sriram Bowers MD;  Location:  [facility];  Service: Cardiovascular;  Laterality: N/A;    CARDIAC CATHETERIZATION N/A 2023    Procedure: Coronary angiography;  Surgeon: Fabiano Duran MD;  [facility];  Service: Cardiology;  Laterality: N/A;    CARDIAC CATHETERIZATION N/A 2023    Procedure: Percutaneous Coronary Intervention;  Surgeon: Sriram Bowers MD;  Location:  [facility];  Service: Cardiovascular;  Laterality: N/A;    CIRCUMCISION N/A 2023    Procedure: CIRCUMCISION;  Surgeon: Himanshu Espinoza MD;  Location:  [facility];  Service: Urology;  Laterality: N/A;    COLONOSCOPY      CORONARY ARTERY BYPASS GRAFT        Family History   Problem Relation Age of Onset    Ovarian cancer Mother     Hypertension Father     Stroke Father       Social History     Socioeconomic History    Marital status:    Tobacco Use    Smoking status: Former     Current packs/day: 0.00     Types: Cigarettes     Quit date: 10/28/2001     Years since quittin.6    Smokeless tobacco: Never   Vaping Use    Vaping status: Never Used   Substance and Sexual Activity    Alcohol use: No    Drug use: No    Sexual activity: Defer      Allergies   Allergen Reactions    Cardizem [Diltiazem Hcl] Rash    Celecoxib Rash      Current Outpatient Medications on File Prior to Visit   Medication Sig Dispense Refill    acetaminophen (TYLENOL) 325 MG tablet Take 2 tablets by mouth Every 4 (Four) Hours As Needed for Mild Pain or Fever (temperature greater than 101F).      aspirin 81 MG chewable tablet Chew 1 tablet Daily. 90 tablet 3    clopidogrel (PLAVIX) 75 MG tablet Take 1 tablet by mouth Daily. 90 tablet 3    donepezil (ARICEPT) 5 MG tablet Take 1 tablet by mouth Every Night. Additional refills per PCP (Patient taking differently: Take 2 tablets by mouth Every Night. Additional refills  per PCP) 30 tablet 5    DULoxetine (Cymbalta) 20 MG capsule Take 1 capsule by mouth Daily. 30 capsule 2    insulin glargine (LANTUS, SEMGLEE) 100 UNIT/ML injection Inject 15 Units under the skin into the appropriate area as directed Every Night.      Insulin Lispro (humaLOG) 100 UNIT/ML injection For use in insulin pump.  MDD: 100 90 mL 1    Insulin Pen Needle (Pen Needles) 32G X 4 MM misc Use 1 each 3 (Three) Times a Day Before Meals. 100 each 5    losartan (COZAAR) 25 MG tablet Take 1 tablet by mouth Daily. 90 tablet 0    metoprolol tartrate (LOPRESSOR) 25 MG tablet Take 0.5 tablets by mouth Every 12 (Twelve) Hours. 30 tablet 5    Omega-3 1000 MG capsule Take 1 capsule by mouth Daily.      rosuvastatin (CRESTOR) 40 MG tablet Take 1 tablet by mouth Every Night. 90 tablet 3    zinc sulfate (ZINCATE) 220 (50 Zn) MG capsule Take 1 capsule by mouth Daily.       No current facility-administered medications on file prior to visit.        Review of Systems   Constitutional:  Positive for fatigue and unexpected weight loss.   Eyes:  Positive for blurred vision.   Endocrine: Positive for polydipsia, polyphagia and polyuria.   Neurological:  Positive for memory problem.   Psychiatric/Behavioral:  Positive for sleep disturbance.    All other systems reviewed and are negative.    /68 (BP Location: Right arm, Patient Position: Sitting, Cuff Size: Adult)   Pulse 78   Wt 75.4 kg (166 lb 3.2 oz)   SpO2 92%   BMI 22.54 kg/m²      Physical Exam  Vitals reviewed.   Constitutional:       Appearance: Normal appearance.      Comments: Thin appearing   Eyes:      Extraocular Movements: Extraocular movements intact.   Cardiovascular:      Rate and Rhythm: Normal rate.   Pulmonary:      Effort: Pulmonary effort is normal.   Feet:      Comments: Mild redness and discomfort noted to the right lower extremity.  Skin:     General: Skin is warm.   Neurological:      Mental Status: He is alert. Mental status is at baseline.       Comments: Patient has underlying cognition impairment.   Psychiatric:         Mood and Affect: Mood normal.         Behavior: Behavior normal.         Thought Content: Thought content normal.         Judgment: Judgment normal.       CMP:  Lab Results   Component Value Date    BUN 17 04/15/2024    CREATININE 0.99 04/15/2024    BCR 17.2 04/15/2024     04/15/2024    K 4.1 04/15/2024    CO2 28.0 04/15/2024    CALCIUM 9.5 04/15/2024    ALBUMIN 4.2 04/15/2024    BILITOT 1.0 04/15/2024    ALKPHOS 78 04/15/2024    AST 26 04/15/2024    ALT 27 04/15/2024     Lipid Panel:  Lab Results   Component Value Date    CHOL 104 04/15/2024    TRIG 62 04/15/2024    HDL 55 04/15/2024    VLDL 14 04/15/2024    LDL 35 04/15/2024     HbA1c:  Lab Results   Component Value Date    HGBA1C 12.5 (A) 04/15/2024    HGBA1C 10.20 (H) 08/26/2023     Glucose:  Lab Results   Component Value Date    POCGLU 247 (A) 05/15/2024     Microalbumin:  Lab Results   Component Value Date    MALBCRERATIO 58.4 (H) 04/15/2024     TSH:  Lab Results   Component Value Date    TSH 0.616 04/15/2024       Assessment and Plan    Diagnoses and all orders for this visit:    1. Type 2 diabetes mellitus with hyperglycemia, with long-term current use of insulin (Primary)  Assessment & Plan:  Discussed with patient and friend in office today that he will have to continue with injections at this time at his current dose.  Will reach out to [employer] to see if there is any solution for pump replacement.  Otherwise he will need to remain on injections at this time.  Follow-up at next scheduled appointment.               Return for Next scheduled follow up. The patient was instructed to contact the clinic with any interval questions or concerns.        This document has been electronically signed by ZARINA Ng  June 14, 2024 11:44 EDT   Endocrinology    Please note that portions of this document were completed with a voice recognition program. Efforts were made  to edit the dictations, but occasionally words are mis-transcribed.

## 2024-07-23 ENCOUNTER — OFFICE VISIT (OUTPATIENT)
Dept: ENDOCRINOLOGY | Facility: CLINIC | Age: 74
End: 2024-07-23
Payer: OTHER GOVERNMENT

## 2024-07-23 ENCOUNTER — SPECIALTY PHARMACY (OUTPATIENT)
Dept: PHARMACY | Facility: HOSPITAL | Age: 74
End: 2024-07-23
Payer: MEDICARE

## 2024-07-23 VITALS
SYSTOLIC BLOOD PRESSURE: 121 MMHG | WEIGHT: 167.4 LBS | HEART RATE: 50 BPM | DIASTOLIC BLOOD PRESSURE: 61 MMHG | BODY MASS INDEX: 22.67 KG/M2 | OXYGEN SATURATION: 100 % | HEIGHT: 72 IN

## 2024-07-23 DIAGNOSIS — Z79.4 TYPE 2 DIABETES MELLITUS WITH HYPERGLYCEMIA, WITH LONG-TERM CURRENT USE OF INSULIN: Primary | ICD-10-CM

## 2024-07-23 DIAGNOSIS — E11.65 TYPE 2 DIABETES MELLITUS WITH HYPERGLYCEMIA, WITH LONG-TERM CURRENT USE OF INSULIN: Primary | ICD-10-CM

## 2024-07-23 LAB
EXPIRATION DATE: ABNORMAL
HBA1C MFR BLD: 9.6 % (ref 4.5–5.7)
Lab: ABNORMAL

## 2024-07-23 RX ORDER — INSULIN ASPART 100 [IU]/ML
INJECTION, SOLUTION INTRAVENOUS; SUBCUTANEOUS
COMMUNITY

## 2024-07-23 NOTE — ASSESSMENT & PLAN NOTE
-Diabetes is above goal with A1c 9.6.  -Discussed dietary and exercise guidelines with patient and family.  -Discussed the importance of yearly eye exams.  -Discussed the importance of checking BG's regularly.  Continue using CGM.  2 week data download is as follows:  Above Target: 52%  Target Range: 48%  Below Target: 0%  Trend shows regulated BG's with some mild post meal hypers.  -Continue with current insulin pump settings.  -S/S hypoglycemia reviewed with Rule of 15's advised.  -Follow-up in 3 months.
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
## 2024-07-23 NOTE — PROGRESS NOTES
Chief Complaint   Patient presents with    Type 2 diabetes mellitus with hyperglycemia, with long-term        Referring Provider  No ref. provider found     HPI   Giorgio Omer is a 73 y.o. male had concerns including Type 2 diabetes mellitus with hyperglycemia, with long-term.    T2DM.    He has recently started back on his Medtronic 780G insulin pump.  He is doing well and denies any issues.  He reports that he had one overt hypo in the last 3 weeks, but otherwise that his BG's have been more regulated.    Diabetes:  Diabetes was diagnosed 2001, had a heart attack and was noted to be DM during the hospital stay.  Complications include heart attack-2001, no others.  Last ophtho exam was 2024.  Current medications for diabetes include Humalog in an Medtronic insulin pump.  He checks his blood sugar with Guardian CGM.   Hypos: none    ACE/ARB: yes, Statin: yes  Labs:  Lipid Panel: due  DREA: due    The following portions of the patient's history were reviewed and updated as appropriate: allergies, current medications, past family history, past medical history, past social history, past surgical history, and problem list.    Diet: he doesn't watch his diet much    Past Medical History:   Diagnosis Date    Arthritis     Coronary artery disease 10/17/2016    Surgical revascularization, Dr. Bryant, 07/01, [facility], receiving LIMA to LAD, free radial graft to PDA, saphenous graft to first diagonal, saphenous graft to obtuse marginal. MPS, May 2008, per [facility]: Normal perfusion, EF 68%.  April 2012 - exercise Cardiolite WNL, EF preserved.    Diabetes mellitus 10/17/2016    on Metformin times one year, onset 2009:  March 2015.  HG A1c 8.6.     Dyslipidemia 10/17/2016    March 2015:  Total cholesterol 143, triglycerides 326, HDL 39, LDL 38.    Hyperlipidemia     Hypertension 10/17/2016    presumed essential.     Right bundle branch block 10/17/2016    baseline     Past Surgical History:   Procedure Laterality Date    CARDIAC  CATHETERIZATION N/A 2022    Procedure: LEFT HEART CATH;  Surgeon: Sriram Bowers MD;  Location:  [facility];  Service: Cardiovascular;  Laterality: N/A;    CARDIAC CATHETERIZATION N/A 2023    Procedure: Coronary angiography;  Surgeon: Fabiano Duran MD;  Location:  [facility];  Service: Cardiology;  Laterality: N/A;    CARDIAC CATHETERIZATION N/A 2023    Procedure: Percutaneous Coronary Intervention;  Surgeon: Sriram Bowers MD;  Location:  [facility];  Service: Cardiovascular;  Laterality: N/A;    CIRCUMCISION N/A 2023    Procedure: CIRCUMCISION;  Surgeon: Himanshu Espinoza MD;  Location:  [facility] OR;  Service: Urology;  Laterality: N/A;    COLONOSCOPY      CORONARY ARTERY BYPASS GRAFT        Family History   Problem Relation Age of Onset    Ovarian cancer Mother     Hypertension Father     Stroke Father       Social History     Socioeconomic History    Marital status:    Tobacco Use    Smoking status: Former     Current packs/day: 0.00     Types: Cigarettes     Quit date: 10/28/2001     Years since quittin.7    Smokeless tobacco: Never   Vaping Use    Vaping status: Never Used   Substance and Sexual Activity    Alcohol use: No    Drug use: No    Sexual activity: Defer      Allergies   Allergen Reactions    Cardizem [Diltiazem Hcl] Rash    Celecoxib Rash      Current Outpatient Medications on File Prior to Visit   Medication Sig Dispense Refill    acetaminophen (TYLENOL) 325 MG tablet Take 2 tablets by mouth Every 4 (Four) Hours As Needed for Mild Pain or Fever (temperature greater than 101F).      aspirin 81 MG chewable tablet Chew 1 tablet Daily. 90 tablet 3    clopidogrel (PLAVIX) 75 MG tablet Take 1 tablet by mouth Daily. 90 tablet 3    donepezil (ARICEPT) 5 MG tablet Take 1 tablet by mouth Every Night. Additional refills per PCP (Patient taking differently: Take 2 tablets by mouth Every Night. Additional refills  "per PCP) 30 tablet 5    DULoxetine (Cymbalta) 20 MG capsule Take 1 capsule by mouth Daily. 30 capsule 2    insulin glargine (LANTUS, SEMGLEE) 100 UNIT/ML injection Inject 15 Units under the skin into the appropriate area as directed Every Night. (Patient not taking: Reported on 7/23/2024)      Insulin Pen Needle (Pen Needles) 32G X 4 MM misc Use 1 each 3 (Three) Times a Day Before Meals. (Patient not taking: Reported on 7/23/2024) 100 each 5    losartan (COZAAR) 25 MG tablet Take 1 tablet by mouth Daily. 90 tablet 0    metoprolol tartrate (LOPRESSOR) 25 MG tablet Take 0.5 tablets by mouth Every 12 (Twelve) Hours. 30 tablet 5    Omega-3 1000 MG capsule Take 1 capsule by mouth Daily.      rosuvastatin (CRESTOR) 40 MG tablet Take 1 tablet by mouth Every Night. 90 tablet 3    zinc sulfate (ZINCATE) 220 (50 Zn) MG capsule Take 1 capsule by mouth Daily.      [DISCONTINUED] Insulin Lispro (humaLOG) 100 UNIT/ML injection For use in insulin pump.  MDD: 100 90 mL 1     No current facility-administered medications on file prior to visit.        Review of Systems   Constitutional:  Positive for fatigue and unexpected weight loss.   Eyes:  Positive for blurred vision.   Endocrine: Positive for polydipsia, polyphagia and polyuria.   Neurological:  Positive for memory problem.   Psychiatric/Behavioral:  Positive for sleep disturbance.    All other systems reviewed and are negative.    /61 (BP Location: Right arm, Patient Position: Sitting, Cuff Size: Adult)   Pulse 50   Ht 182.9 cm (72\")   Wt 75.9 kg (167 lb 6.4 oz)   SpO2 100%   BMI 22.70 kg/m²      Physical Exam  Vitals reviewed.   Constitutional:       Appearance: Normal appearance.      Comments: Thin appearing   Eyes:      Extraocular Movements: Extraocular movements intact.   Cardiovascular:      Rate and Rhythm: Normal rate.   Pulmonary:      Effort: Pulmonary effort is normal.   Feet:      Comments: Mild redness and discomfort noted to the right lower " extremity.  Skin:     General: Skin is warm.   Neurological:      Mental Status: He is alert. Mental status is at baseline.   Psychiatric:         Mood and Affect: Mood normal.         Behavior: Behavior normal.         Thought Content: Thought content normal.         Judgment: Judgment normal.       CMP:  Lab Results   Component Value Date    BUN 17 04/15/2024    CREATININE 0.99 04/15/2024    BCR 17.2 04/15/2024     04/15/2024    K 4.1 04/15/2024    CO2 28.0 04/15/2024    CALCIUM 9.5 04/15/2024    ALBUMIN 4.2 04/15/2024    BILITOT 1.0 04/15/2024    ALKPHOS 78 04/15/2024    AST 26 04/15/2024    ALT 27 04/15/2024     Lipid Panel:  Lab Results   Component Value Date    CHOL 104 04/15/2024    TRIG 62 04/15/2024    HDL 55 04/15/2024    VLDL 14 04/15/2024    LDL 35 04/15/2024     HbA1c:  Lab Results   Component Value Date    HGBA1C 9.6 (A) 07/23/2024    HGBA1C 12.5 (A) 04/15/2024     Glucose:  Lab Results   Component Value Date    POCGLU 247 (A) 05/15/2024     Microalbumin:  Lab Results   Component Value Date    MALBCRERATIO 58.4 (H) 04/15/2024     TSH:  Lab Results   Component Value Date    TSH 0.616 04/15/2024       Assessment and Plan    Diagnoses and all orders for this visit:    1. Type 2 diabetes mellitus with hyperglycemia, with long-term current use of insulin (Primary)  Assessment & Plan:  -Diabetes is above goal with A1c 9.6.  -Discussed dietary and exercise guidelines with patient and family.  -Discussed the importance of yearly eye exams.  -Discussed the importance of checking BG's regularly.  Continue using CGM.  2 week data download is as follows:  Above Target: 52%  Target Range: 48%  Below Target: 0%  Trend shows regulated BG's with some mild post meal hypers.  -Continue with current insulin pump settings.  -S/S hypoglycemia reviewed with Rule of 15's advised.  -Follow-up in 3 months.     Orders:  -     POC Glycosylated Hemoglobin (Hb A1C)             Return in about 3 months (around 10/23/2024) for  Follow-up appointment, A1C. The patient was instructed to contact the clinic with any interval questions or concerns.        This document has been electronically signed by ZARINA Ng  July 23, 2024 10:26 EDT   Endocrinology    Please note that portions of this document were completed with a voice recognition program. Efforts were made to edit the dictations, but occasionally words are mis-transcribed.

## 2024-10-15 RX ORDER — DULOXETIN HYDROCHLORIDE 20 MG/1
20 CAPSULE, DELAYED RELEASE ORAL DAILY
Qty: 30 CAPSULE | Refills: 2 | Status: SHIPPED | OUTPATIENT
Start: 2024-10-15 | End: 2024-10-16 | Stop reason: SDUPTHER

## 2024-10-16 RX ORDER — DULOXETIN HYDROCHLORIDE 20 MG/1
20 CAPSULE, DELAYED RELEASE ORAL DAILY
Qty: 30 CAPSULE | Refills: 2 | Status: SHIPPED | OUTPATIENT
Start: 2024-10-16 | End: 2025-10-16

## 2024-11-06 ENCOUNTER — OFFICE VISIT (OUTPATIENT)
Dept: CARDIOLOGY | Facility: CLINIC | Age: 74
End: 2024-11-06
Payer: MEDICARE

## 2024-11-06 ENCOUNTER — OFFICE VISIT (OUTPATIENT)
Dept: ENDOCRINOLOGY | Facility: CLINIC | Age: 74
End: 2024-11-06
Payer: OTHER GOVERNMENT

## 2024-11-06 VITALS
WEIGHT: 160.8 LBS | DIASTOLIC BLOOD PRESSURE: 59 MMHG | BODY MASS INDEX: 21.81 KG/M2 | OXYGEN SATURATION: 99 % | HEART RATE: 60 BPM | SYSTOLIC BLOOD PRESSURE: 140 MMHG

## 2024-11-06 VITALS
DIASTOLIC BLOOD PRESSURE: 60 MMHG | WEIGHT: 159.8 LBS | OXYGEN SATURATION: 97 % | SYSTOLIC BLOOD PRESSURE: 138 MMHG | BODY MASS INDEX: 21.18 KG/M2 | HEART RATE: 55 BPM | HEIGHT: 73 IN

## 2024-11-06 DIAGNOSIS — Z79.4 TYPE 2 DIABETES MELLITUS WITH HYPERGLYCEMIA, WITH LONG-TERM CURRENT USE OF INSULIN: Primary | ICD-10-CM

## 2024-11-06 DIAGNOSIS — I25.10 CORONARY ARTERY DISEASE INVOLVING NATIVE CORONARY ARTERY OF NATIVE HEART WITHOUT ANGINA PECTORIS: Primary | ICD-10-CM

## 2024-11-06 DIAGNOSIS — E11.65 TYPE 2 DIABETES MELLITUS WITH HYPERGLYCEMIA, WITH LONG-TERM CURRENT USE OF INSULIN: Primary | ICD-10-CM

## 2024-11-06 DIAGNOSIS — E11.9 TYPE 2 DIABETES MELLITUS WITHOUT COMPLICATION, WITHOUT LONG-TERM CURRENT USE OF INSULIN: ICD-10-CM

## 2024-11-06 DIAGNOSIS — E78.2 MIXED HYPERLIPIDEMIA: ICD-10-CM

## 2024-11-06 DIAGNOSIS — I10 PRIMARY HYPERTENSION: ICD-10-CM

## 2024-11-06 LAB
EXPIRATION DATE: ABNORMAL
EXPIRATION DATE: ABNORMAL
GLUCOSE BLDC GLUCOMTR-MCNC: 151 MG/DL (ref 70–130)
HBA1C MFR BLD: 7.1 % (ref 4.5–5.7)
Lab: ABNORMAL
Lab: ABNORMAL

## 2024-11-06 RX ORDER — GLUCAGON 1 MG
1 KIT INJECTION AS NEEDED
Qty: 1 EACH | Refills: 2 | Status: SHIPPED | OUTPATIENT
Start: 2024-11-06
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
## 2024-11-06 NOTE — PROGRESS NOTES
Chief Complaint   Patient presents with    Follow-up     T2DM, does have an insulin pump        Referring Provider  No ref. provider found     HPI   Giorgio Omer is a 74 y.o. male had concerns including Follow-up (T2DM, does have an insulin pump).    T2DM.    He is currently using Medtronic 780G insulin pump.  He is doing well and denies any issues.  He reports that he has some hypers with meals, but does not bolus with meals.  These are corrected with the function of the pump. He denies any overt hypos.    Diabetes:  Diabetes was diagnosed 2001, had a heart attack and was noted to be DM during the hospital stay.  Complications include heart attack-2001, no others.  Last ophtho exam was 2024.  Current medications for diabetes include Humalog in an Medtronic insulin pump.  He checks his blood sugar with Guardian CGM.   Hypos: none    ACE/ARB: yes, Statin: yes  Labs:  Lipid Panel: due  DREA: due    The following portions of the patient's history were reviewed and updated as appropriate: allergies, current medications, past family history, past medical history, past social history, past surgical history, and problem list.    Diet: he doesn't watch his diet much    Past Medical History:   Diagnosis Date    Arthritis     Coronary artery disease 10/17/2016    Surgical revascularization, Dr. Bryant, 07/01, [facility], receiving LIMA to LAD, free radial graft to PDA, saphenous graft to first diagonal, saphenous graft to obtuse marginal. MPS, May 2008, per [facility]: Normal perfusion, EF 68%.  April 2012 - exercise Cardiolite WNL, EF preserved.    Diabetes mellitus 10/17/2016    on Metformin times one year, onset 2009:  March 2015.  HG A1c 8.6.     Dyslipidemia 10/17/2016    March 2015:  Total cholesterol 143, triglycerides 326, HDL 39, LDL 38.    Hyperlipidemia     Hypertension 10/17/2016    presumed essential.     Right bundle branch block 10/17/2016    baseline     Past Surgical History:   Procedure Laterality Date    CARDIAC  CATHETERIZATION N/A 2022    Procedure: LEFT HEART CATH;  Surgeon: Sriram Bowers MD;  Location:  [facility];  Service: Cardiovascular;  Laterality: N/A;    CARDIAC CATHETERIZATION N/A 2023    Procedure: Coronary angiography;  Surgeon: Fabiano Duran MD;  [facility];  Service: Cardiology;  Laterality: N/A;    CARDIAC CATHETERIZATION N/A 2023    Procedure: Percutaneous Coronary Intervention;  Surgeon: Sriram Bowers MD;  Location:  [facility];  Service: Cardiovascular;  Laterality: N/A;    CIRCUMCISION N/A 2023    Procedure: CIRCUMCISION;  Surgeon: Himanshu Espinoza MD;  Location:  [facility];  Service: Urology;  Laterality: N/A;    COLONOSCOPY      CORONARY ARTERY BYPASS GRAFT        Family History   Problem Relation Age of Onset    Ovarian cancer Mother     Hypertension Father     Stroke Father       Social History     Socioeconomic History    Marital status:    Tobacco Use    Smoking status: Former     Current packs/day: 0.00     Types: Cigarettes     Quit date: 10/28/2001     Years since quittin.0    Smokeless tobacco: Never   Vaping Use    Vaping status: Never Used   Substance and Sexual Activity    Alcohol use: No    Drug use: No    Sexual activity: Defer      Allergies   Allergen Reactions    Cardizem [Diltiazem Hcl] Rash    Celecoxib Rash      Current Outpatient Medications on File Prior to Visit   Medication Sig Dispense Refill    acetaminophen (TYLENOL) 325 MG tablet Take 2 tablets by mouth Every 4 (Four) Hours As Needed for Mild Pain or Fever (temperature greater than 101F).      aspirin 81 MG chewable tablet Chew 1 tablet Daily. 90 tablet 3    clopidogrel (PLAVIX) 75 MG tablet Take 1 tablet by mouth Daily. 90 tablet 3    donepezil (ARICEPT) 5 MG tablet Take 1 tablet by mouth Every Night. Additional refills per PCP (Patient taking differently: Take 2 tablets by mouth Every Night. Additional refills  per PCP) 30 tablet 5    DULoxetine (CYMBALTA) 20 MG capsule Take 1 capsule by mouth Daily. 30 capsule 2    Insulin Aspart (novoLOG) 100 UNIT/ML injection Inject  under the skin into the appropriate area as directed 3 (Three) Times a Day Before Meals. FOR INSULIN PUMP      Insulin Pen Needle (Pen Needles) 32G X 4 MM misc Use 1 each 3 (Three) Times a Day Before Meals. 100 each 5    losartan (COZAAR) 25 MG tablet Take 1 tablet by mouth Daily. 90 tablet 0    metoprolol tartrate (LOPRESSOR) 25 MG tablet Take 0.5 tablets by mouth Every 12 (Twelve) Hours. 30 tablet 5    Omega-3 1000 MG capsule Take 1 capsule by mouth Daily.      rosuvastatin (CRESTOR) 40 MG tablet Take 1 tablet by mouth Every Night. 90 tablet 3    zinc sulfate (ZINCATE) 220 (50 Zn) MG capsule Take 1 capsule by mouth Daily.      insulin glargine (LANTUS, SEMGLEE) 100 UNIT/ML injection Inject 15 Units under the skin into the appropriate area as directed Every Night. (Patient not taking: Reported on 7/23/2024)       No current facility-administered medications on file prior to visit.        Review of Systems   Constitutional:  Positive for fatigue and unexpected weight loss.   Eyes:  Positive for blurred vision.   Endocrine: Positive for polydipsia, polyphagia and polyuria.   Neurological:  Positive for memory problem.   Psychiatric/Behavioral:  Positive for sleep disturbance.    All other systems reviewed and are negative.    /59 (BP Location: Right arm, Patient Position: Sitting, Cuff Size: Adult)   Pulse 60   Wt 72.9 kg (160 lb 12.8 oz)   SpO2 99%   BMI 21.81 kg/m²      Physical Exam  Vitals reviewed.   Constitutional:       Appearance: Normal appearance.      Comments: Thin appearing   Eyes:      Extraocular Movements: Extraocular movements intact.   Cardiovascular:      Rate and Rhythm: Normal rate.   Pulmonary:      Effort: Pulmonary effort is normal.   Feet:      Comments: Mild redness and discomfort noted to the right lower  extremity.  Skin:     General: Skin is warm.   Neurological:      Mental Status: He is alert. Mental status is at baseline.   Psychiatric:         Mood and Affect: Mood normal.         Behavior: Behavior normal.         Thought Content: Thought content normal.         Judgment: Judgment normal.       CMP:  Lab Results   Component Value Date    GLUCOSE 334 (H) 04/15/2024    BUN 17 04/15/2024    CREATININE 0.99 04/15/2024     04/15/2024    K 4.1 04/15/2024     04/15/2024    CALCIUM 9.5 04/15/2024    PROTEINTOT 7.0 04/15/2024    ALBUMIN 4.2 04/15/2024    ALT 27 04/15/2024    AST 26 04/15/2024    ALKPHOS 78 04/15/2024    BILITOT 1.0 04/15/2024    GLOB 2.8 04/15/2024    AGRATIO 1.5 04/15/2024    BCR 17.2 04/15/2024    ANIONGAP 8.0 04/15/2024    EGFR 79.9 04/15/2024       Lipid Panel:  Lab Results   Component Value Date    CHOL 104 04/15/2024    TRIG 62 04/15/2024    HDL 55 04/15/2024    VLDL 14 04/15/2024    LDL 35 04/15/2024     HbA1c:  Lab Results   Component Value Date    HGBA1C 7.1 (A) 11/06/2024     Glucose:  Lab Results   Component Value Date    POCGLU 151 (A) 11/06/2024     Microalbumin:  Lab Results   Component Value Date    MALBCRERATIO 58.4 (H) 04/15/2024     TSH:  Lab Results   Component Value Date    TSH 0.616 04/15/2024       Assessment and Plan    Diagnoses and all orders for this visit:    1. Type 2 diabetes mellitus with hyperglycemia, with long-term current use of insulin (Primary)  Assessment & Plan:  -Diabetes is improving with A1c 7.1.  -Discussed dietary and exercise guidelines with patient and family.  -Discussed the importance of yearly eye exams.  -Discussed the importance of checking BG's regularly.  Continue using CGM.  2 week data download is as follows:  Above Target: 27%  Target Range: 73%  Below Target: 0%  Trend shows regulated BG's with some mild post meal hypers.  -Continue with current insulin pump settings.  -Discussed the use of Glucagon at appropriate timing.  -S/S  hypoglycemia reviewed with Rule of 15's advised.  -Follow-up in 3 months.     Orders:  -     POC Glycosylated Hemoglobin (Hb A1C)  -     POC Glucose, Blood    Other orders  -     Glucagon HCl (Glucagon Emergency) 1 MG/ML reconstituted solution; Inject 1 mL into the appropriate muscle as directed by prescriber As Needed (for severe hypoglycemia and not able to resolve with oral agents.  Administer and call 911.).  Dispense: 1 each; Refill: 2             Return in about 3 months (around 2/6/2025) for A1C, Follow-up appointment. The patient was instructed to contact the clinic with any interval questions or concerns.        This document has been electronically signed by ZARINA Ng  November 6, 2024 09:16 EST   Endocrinology    Please note that portions of this document were completed with a voice recognition program. Efforts were made to edit the dictations, but occasionally words are mis-transcribed.

## 2024-11-06 NOTE — ASSESSMENT & PLAN NOTE
-Diabetes is improving with A1c 7.1.  -Discussed dietary and exercise guidelines with patient and family.  -Discussed the importance of yearly eye exams.  -Discussed the importance of checking BG's regularly.  Continue using CGM.  2 week data download is as follows:  Above Target: 27%  Target Range: 73%  Below Target: 0%  Trend shows regulated BG's with some mild post meal hypers.  -Continue with current insulin pump settings.  -Discussed the use of Glucagon at appropriate timing.  -S/S hypoglycemia reviewed with Rule of 15's advised.  -Follow-up in 3 months.

## 2025-02-18 ENCOUNTER — OFFICE VISIT (OUTPATIENT)
Dept: ENDOCRINOLOGY | Facility: CLINIC | Age: 75
End: 2025-02-18
Payer: OTHER GOVERNMENT

## 2025-02-18 VITALS
BODY MASS INDEX: 21.29 KG/M2 | HEART RATE: 60 BPM | DIASTOLIC BLOOD PRESSURE: 57 MMHG | OXYGEN SATURATION: 100 % | WEIGHT: 161.4 LBS | SYSTOLIC BLOOD PRESSURE: 131 MMHG

## 2025-02-18 DIAGNOSIS — E11.65 TYPE 2 DIABETES MELLITUS WITH HYPERGLYCEMIA, WITH LONG-TERM CURRENT USE OF INSULIN: Primary | ICD-10-CM

## 2025-02-18 DIAGNOSIS — Z79.4 TYPE 2 DIABETES MELLITUS WITH HYPERGLYCEMIA, WITH LONG-TERM CURRENT USE OF INSULIN: Primary | ICD-10-CM

## 2025-02-18 LAB
ALBUMIN SERPL-MCNC: 3.9 G/DL (ref 3.5–5.2)
ALBUMIN UR-MCNC: 135.4 MG/DL
ALBUMIN/GLOB SERPL: 1.3 G/DL
ALP SERPL-CCNC: 75 U/L (ref 39–117)
ALT SERPL W P-5'-P-CCNC: 20 U/L (ref 1–41)
ANION GAP SERPL CALCULATED.3IONS-SCNC: 6 MMOL/L (ref 5–15)
AST SERPL-CCNC: 23 U/L (ref 1–40)
BILIRUB SERPL-MCNC: 0.6 MG/DL (ref 0–1.2)
BUN SERPL-MCNC: 24 MG/DL (ref 8–23)
BUN/CREAT SERPL: 15.2 (ref 7–25)
CALCIUM SPEC-SCNC: 9.6 MG/DL (ref 8.6–10.5)
CHLORIDE SERPL-SCNC: 106 MMOL/L (ref 98–107)
CHOLEST SERPL-MCNC: 122 MG/DL (ref 0–200)
CO2 SERPL-SCNC: 27 MMOL/L (ref 22–29)
CREAT SERPL-MCNC: 1.58 MG/DL (ref 0.76–1.27)
CREAT UR-MCNC: 106.1 MG/DL
EGFRCR SERPLBLD CKD-EPI 2021: 45.3 ML/MIN/1.73
EXPIRATION DATE: ABNORMAL
GLOBULIN UR ELPH-MCNC: 2.9 GM/DL
GLUCOSE SERPL-MCNC: 152 MG/DL (ref 65–99)
HBA1C MFR BLD: 6.5 % (ref 4.5–5.7)
HDLC SERPL-MCNC: 49 MG/DL (ref 40–60)
LDLC SERPL CALC-MCNC: 44 MG/DL (ref 0–100)
LDLC/HDLC SERPL: 0.76 {RATIO}
Lab: ABNORMAL
MICROALBUMIN/CREAT UR: 1276.2 MG/G (ref 0–29)
POTASSIUM SERPL-SCNC: 4.3 MMOL/L (ref 3.5–5.2)
PROT SERPL-MCNC: 6.8 G/DL (ref 6–8.5)
SODIUM SERPL-SCNC: 139 MMOL/L (ref 136–145)
T4 FREE SERPL-MCNC: 1.18 NG/DL (ref 0.92–1.68)
TRIGL SERPL-MCNC: 178 MG/DL (ref 0–150)
TSH SERPL DL<=0.05 MIU/L-ACNC: 1.41 UIU/ML (ref 0.27–4.2)
VLDLC SERPL-MCNC: 29 MG/DL (ref 5–40)

## 2025-02-18 PROCEDURE — 95251 CONT GLUC MNTR ANALYSIS I&R: CPT | Performed by: NURSE PRACTITIONER

## 2025-02-18 PROCEDURE — 83036 HEMOGLOBIN GLYCOSYLATED A1C: CPT | Performed by: NURSE PRACTITIONER

## 2025-02-18 PROCEDURE — 99214 OFFICE O/P EST MOD 30 MIN: CPT | Performed by: NURSE PRACTITIONER

## 2025-02-18 PROCEDURE — 84443 ASSAY THYROID STIM HORMONE: CPT | Performed by: NURSE PRACTITIONER

## 2025-02-18 PROCEDURE — 80053 COMPREHEN METABOLIC PANEL: CPT | Performed by: NURSE PRACTITIONER

## 2025-02-18 PROCEDURE — 80061 LIPID PANEL: CPT | Performed by: NURSE PRACTITIONER

## 2025-02-18 PROCEDURE — 82043 UR ALBUMIN QUANTITATIVE: CPT | Performed by: NURSE PRACTITIONER

## 2025-02-18 PROCEDURE — 84439 ASSAY OF FREE THYROXINE: CPT | Performed by: NURSE PRACTITIONER

## 2025-02-18 PROCEDURE — 82570 ASSAY OF URINE CREATININE: CPT | Performed by: NURSE PRACTITIONER

## 2025-02-18 NOTE — ASSESSMENT & PLAN NOTE
-Diabetes is at goal with A1c 6.5.  -Discussed dietary and exercise guidelines with patient and family.  -Discussed the importance of yearly eye exams.  -Discussed the importance of checking BG's regularly.  Continue using CGM.  2 week data download is as follows:  Above Target: 22%  Target Range: 78%  Below Target: 0%  Trend shows regulated BG's with some mild post meal hypers.  -Continue with current insulin pump settings.  -Discussed the use of Glucagon at appropriate timing.  -S/S hypoglycemia reviewed with Rule of 15's advised.  -Follow-up in 3 months.

## 2025-02-18 NOTE — PROGRESS NOTES
Chief Complaint   Patient presents with    Follow-up     T2DM        Referring Provider  No ref. provider found     HPI   Giorgio Omer is a 74 y.o. male had concerns including Follow-up (T2DM).    T2DM.    He is currently using Medtronic 780G insulin pump.  He is doing well and denies any issues.  He reports that he has some hypers with meals, but does not bolus with meals.  These are corrected with the function of the pump. He denies any overt hypos.  His daughter in law is with her today with his son on the phone, who is the one who takes care of him. 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     Diabetes:  Diabetes was diagnosed 2001, had a heart attack and was noted to be DM during the hospital stay.  Complications include heart attack-2001, no others.  Last ophtho exam was 2024.  Current medications for diabetes include Humalog in an Medtronic insulin pump.  He checks his blood sugar with Guardian CGM.   Hypos: none    ACE/ARB: yes, Statin: yes  Labs:  Lipid Panel: due  DREA: due    The following portions of the patient's history were reviewed and updated as appropriate: allergies, current medications, past family history, past medical history, past social history, past surgical history, and problem list.    Diet: he doesn't watch his diet much    Past Medical History:   Diagnosis Date    Arthritis     Coronary artery disease 10/17/2016    Surgical revascularization, Dr. Bryant, 07/01, [facility], receiving LIMA to LAD, free radial graft to PDA, saphenous graft to first diagonal, saphenous graft to obtuse marginal. MPS, May 2008, per [facility]: Normal perfusion, EF 68%.  April 2012 - exercise Cardiolite WNL, EF preserved.    Diabetes mellitus 10/17/2016    on Metformin times one year, onset 2009:  March 2015.  HG A1c 8.6.     Dyslipidemia 10/17/2016    March 2015:  Total cholesterol 143, triglycerides 326, HDL 39, LDL 38.    Hyperlipidemia     Hypertension 10/17/2016    presumed  essential.     Right bundle branch block 10/17/2016    baseline     Past Surgical History:   Procedure Laterality Date    CARDIAC CATHETERIZATION N/A 2022    Procedure: LEFT HEART CATH;  Surgeon: Sriram Bowers MD;  Location:  [facility];  Service: Cardiovascular;  Laterality: N/A;    CARDIAC CATHETERIZATION N/A 2023    Procedure: Coronary angiography;  Surgeon: Fabiano Duran MD;  [facility];  Service: Cardiology;  Laterality: N/A;    CARDIAC CATHETERIZATION N/A 2023    Procedure: Percutaneous Coronary Intervention;  Surgeon: Sriram Bowers MD;  Location:  [facility];  Service: Cardiovascular;  Laterality: N/A;    CIRCUMCISION N/A 2023    Procedure: CIRCUMCISION;  Surgeon: Himanshu Espinoza MD;  Location:  [facility];  Service: Urology;  Laterality: N/A;    COLONOSCOPY      CORONARY ARTERY BYPASS GRAFT        Family History   Problem Relation Age of Onset    Ovarian cancer Mother     Hypertension Father     Stroke Father       Social History     Socioeconomic History    Marital status:    Tobacco Use    Smoking status: Former     Current packs/day: 0.00     Types: Cigarettes     Quit date: 10/28/2001     Years since quittin.3    Smokeless tobacco: Never   Vaping Use    Vaping status: Never Used   Substance and Sexual Activity    Alcohol use: No    Drug use: No    Sexual activity: Defer      Allergies   Allergen Reactions    Cardizem [Diltiazem Hcl] Rash    Celecoxib Rash      Current Outpatient Medications on File Prior to Visit   Medication Sig Dispense Refill    acetaminophen (TYLENOL) 325 MG tablet Take 2 tablets by mouth Every 4 (Four) Hours As Needed for Mild Pain or Fever (temperature greater than 101F).      aspirin 81 MG chewable tablet Chew 1 tablet Daily. 90 tablet 3    clopidogrel (PLAVIX) 75 MG tablet Take 1 tablet by mouth Daily. 90 tablet 3    donepezil (ARICEPT) 5 MG tablet Take 1 tablet by  mouth Every Night. Additional refills per PCP (Patient taking differently: Take 2 tablets by mouth Every Night. Additional refills per PCP) 30 tablet 5    DULoxetine (CYMBALTA) 20 MG capsule Take 1 capsule by mouth Daily. 30 capsule 2    Glucagon HCl (Glucagon Emergency) 1 MG/ML reconstituted solution Inject 1 mL into the appropriate muscle as directed by prescriber As Needed (for severe hypoglycemia and not able to resolve with oral agents.  Administer and call 911.). 1 each 2    Insulin Aspart (novoLOG) 100 UNIT/ML injection Inject  under the skin into the appropriate area as directed 3 (Three) Times a Day Before Meals. FOR INSULIN PUMP      Insulin Pen Needle (Pen Needles) 32G X 4 MM misc Use 1 each 3 (Three) Times a Day Before Meals. 100 each 5    losartan (COZAAR) 25 MG tablet Take 1 tablet by mouth Daily. 90 tablet 0    metoprolol tartrate (LOPRESSOR) 25 MG tablet Take 0.5 tablets by mouth Every 12 (Twelve) Hours. 30 tablet 5    Omega-3 1000 MG capsule Take 1 capsule by mouth Daily.      rosuvastatin (CRESTOR) 40 MG tablet Take 1 tablet by mouth Every Night. 90 tablet 3    zinc sulfate (ZINCATE) 220 (50 Zn) MG capsule Take 1 capsule by mouth Daily.      insulin glargine (LANTUS, SEMGLEE) 100 UNIT/ML injection Inject 15 Units under the skin into the appropriate area as directed Every Night. (Patient not taking: Reported on 7/23/2024)       No current facility-administered medications on file prior to visit.        Review of Systems   Constitutional:  Positive for fatigue and unexpected weight loss.   Eyes:  Positive for blurred vision.   Endocrine: Positive for polydipsia, polyphagia and polyuria.   Neurological:  Positive for memory problem.   Psychiatric/Behavioral:  Positive for sleep disturbance.    All other systems reviewed and are negative.    /57 (BP Location: Right arm, Patient Position: Sitting, Cuff Size: Adult)   Pulse 60   Wt 73.2 kg (161 lb 6.4 oz)   SpO2 100%   BMI 21.29 kg/m²       Physical Exam  Vitals reviewed.   Constitutional:       Appearance: Normal appearance.      Comments: Thin appearing   Eyes:      Extraocular Movements: Extraocular movements intact.   Cardiovascular:      Rate and Rhythm: Normal rate.   Pulmonary:      Effort: Pulmonary effort is normal.   Skin:     General: Skin is warm.   Neurological:      Mental Status: He is alert. Mental status is at baseline.   Psychiatric:         Mood and Affect: Mood normal.         Behavior: Behavior normal.         Thought Content: Thought content normal.         Judgment: Judgment normal.       CMP:  Lab Results   Component Value Date    GLUCOSE 334 (H) 04/15/2024    BUN 17 04/15/2024    CREATININE 0.99 04/15/2024     04/15/2024    K 4.1 04/15/2024     04/15/2024    CALCIUM 9.5 04/15/2024    PROTEINTOT 7.0 04/15/2024    ALBUMIN 4.2 04/15/2024    ALT 27 04/15/2024    AST 26 04/15/2024    ALKPHOS 78 04/15/2024    BILITOT 1.0 04/15/2024    GLOB 2.8 04/15/2024    AGRATIO 1.5 04/15/2024    BCR 17.2 04/15/2024    ANIONGAP 8.0 04/15/2024    EGFR 79.9 04/15/2024       Lipid Panel:  Lab Results   Component Value Date    CHOL 104 04/15/2024    TRIG 62 04/15/2024    HDL 55 04/15/2024    VLDL 14 04/15/2024    LDL 35 04/15/2024     HbA1c:  Lab Results   Component Value Date    HGBA1C 6.5 (A) 02/18/2025     Glucose:  Lab Results   Component Value Date    POCGLU 151 (A) 11/06/2024     Microalbumin:  Lab Results   Component Value Date    MALBCRERATIO 58.4 (H) 04/15/2024     TSH:  Lab Results   Component Value Date    TSH 0.616 04/15/2024       Assessment and Plan    Diagnoses and all orders for this visit:    1. Type 2 diabetes mellitus with hyperglycemia, with long-term current use of insulin (Primary)  Assessment & Plan:  -Diabetes is at goal with A1c 6.5.  -Discussed dietary and exercise guidelines with patient and family.  -Discussed the importance of yearly eye exams.  -Discussed the importance of checking BG's regularly.   Continue using CGM.  2 week data download is as follows:  Above Target: 22%  Target Range: 78%  Below Target: 0%  Trend shows regulated BG's with some mild post meal hypers.  -Continue with current insulin pump settings.  -Discussed the use of Glucagon at appropriate timing.  -S/S hypoglycemia reviewed with Rule of 15's advised.  -Follow-up in 3 months.     Orders:  -     POC Glycosylated Hemoglobin (Hb A1C)  -     Microalbumin / Creatinine Urine Ratio - Urine, Clean Catch  -     Comprehensive Metabolic Panel  -     Lipid Panel  -     TSH  -     T4, free             Return in about 3 months (around 5/18/2025) for Follow-up appointment, A1C. The patient was instructed to contact the clinic with any interval questions or concerns.        This document has been electronically signed by ZARINA Ng  February 18, 2025 12:17 EST   Endocrinology    Please note that portions of this document were completed with a voice recognition program. Efforts were made to edit the dictations, but occasionally words are mis-transcribed.

## 2025-02-19 DIAGNOSIS — R80.9 MICROALBUMINURIA DUE TO TYPE 1 DIABETES MELLITUS: Primary | ICD-10-CM

## 2025-02-19 DIAGNOSIS — E10.29 MICROALBUMINURIA DUE TO TYPE 1 DIABETES MELLITUS: Primary | ICD-10-CM

## 2025-02-26 ENCOUNTER — TELEPHONE (OUTPATIENT)
Dept: CARDIOLOGY | Facility: CLINIC | Age: 75
End: 2025-02-26
Payer: MEDICARE

## 2025-02-26 NOTE — TELEPHONE ENCOUNTER
Pt to have L4,L5 laminectomy with Dr Mil Murray [place] . They are requesting cardiac clearance along with direction of ASA and Plavix . Pts son states he's doing well from a cardiac stabndpoint , however getting worked up currently with nephrology for an elevation in his creatinine. Please advise . Fax clearance to [phone]    Last OV 11/6/24: PROBLEM LIST:    Coronary artery disease:  Surgical revascularization,  Dr. Bryant, 07/01, [facility], receiving LIMA to LAD, free radial graft to PDA, saphenous graft to first diagonal, saphenous graft to obtuse marginal.  7/24/19 MPS exercise cardiolite wnl  8/22 LHC per TM 4.0X 38 Xience to svg- D  patent lima/lad, radial to PDA. 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  CAD post recent PCI and stenting remote surgical revascularization continue GDMT and DAPT until at least August .  [facility] had suggested low-dose Xarelto for his PVD of which she is not taking at current

## 2025-02-27 NOTE — TELEPHONE ENCOUNTER
Acceptable cardiac risk  May discontinue Plavix altogether (last intervention 8/23)- be off Plavix 5-7 days prior to OR.   continue on aspirin 81 mg indefinitely   Please verify if he is taking Xarelto 2.5 mg prescribed by the [facility] for his PVD    If aspirin required to be held will need to accept higher risk of ACS

## 2025-04-21 RX ORDER — BLOOD-GLUCOSE TRANSMITTER
1 EACH MISCELLANEOUS
Qty: 1 EACH | Refills: 1 | Status: SHIPPED | OUTPATIENT
Start: 2025-04-21

## 2025-04-21 RX ORDER — INFUSION SET FOR INSULIN PUMP
1 INFUSION SETS-PARAPHERNALIA MISCELLANEOUS
Qty: 4 EACH | Refills: 2 | Status: SHIPPED | OUTPATIENT
Start: 2025-04-21

## 2025-04-21 RX ORDER — BLOOD-GLUCOSE SENSOR
1 EACH MISCELLANEOUS
Qty: 3 EACH | Refills: 2 | Status: SHIPPED | OUTPATIENT
Start: 2025-04-21

## 2025-04-21 RX ORDER — INSULIN PUMP SYRINGE, 3 ML
1 EACH MISCELLANEOUS
Qty: 2 EACH | Refills: 1 | Status: SHIPPED | OUTPATIENT
Start: 2025-04-21

## 2025-05-19 ENCOUNTER — OFFICE VISIT (OUTPATIENT)
Dept: CARDIOLOGY | Facility: CLINIC | Age: 75
End: 2025-05-19
Payer: MEDICARE

## 2025-05-19 VITALS
OXYGEN SATURATION: 96 % | SYSTOLIC BLOOD PRESSURE: 132 MMHG | BODY MASS INDEX: 21.3 KG/M2 | WEIGHT: 166 LBS | DIASTOLIC BLOOD PRESSURE: 60 MMHG | HEIGHT: 74 IN | HEART RATE: 44 BPM

## 2025-05-19 DIAGNOSIS — I10 PRIMARY HYPERTENSION: ICD-10-CM

## 2025-05-19 DIAGNOSIS — E11.9 TYPE 2 DIABETES MELLITUS WITHOUT COMPLICATION, WITHOUT LONG-TERM CURRENT USE OF INSULIN: ICD-10-CM

## 2025-05-19 DIAGNOSIS — E78.2 MIXED HYPERLIPIDEMIA: ICD-10-CM

## 2025-05-19 DIAGNOSIS — I25.10 CORONARY ARTERY DISEASE INVOLVING NATIVE CORONARY ARTERY OF NATIVE HEART WITHOUT ANGINA PECTORIS: Primary | ICD-10-CM

## 2025-05-19 RX ORDER — DULOXETIN HYDROCHLORIDE 20 MG/1
20 CAPSULE, DELAYED RELEASE ORAL DAILY
Qty: 90 CAPSULE | Refills: 2 | Status: SHIPPED | OUTPATIENT
Start: 2025-05-19 | End: 2026-05-19
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
## 2025-05-27 ENCOUNTER — OFFICE VISIT (OUTPATIENT)
Dept: ENDOCRINOLOGY | Facility: CLINIC | Age: 75
End: 2025-05-27
Payer: MEDICARE

## 2025-05-27 VITALS
OXYGEN SATURATION: 98 % | DIASTOLIC BLOOD PRESSURE: 66 MMHG | HEART RATE: 60 BPM | WEIGHT: 170 LBS | SYSTOLIC BLOOD PRESSURE: 147 MMHG | BODY MASS INDEX: 21.83 KG/M2

## 2025-05-27 DIAGNOSIS — E11.65 TYPE 2 DIABETES MELLITUS WITH HYPERGLYCEMIA, WITH LONG-TERM CURRENT USE OF INSULIN: Primary | ICD-10-CM

## 2025-05-27 DIAGNOSIS — Z79.4 TYPE 2 DIABETES MELLITUS WITH HYPERGLYCEMIA, WITH LONG-TERM CURRENT USE OF INSULIN: Primary | ICD-10-CM

## 2025-05-27 LAB
EXPIRATION DATE: ABNORMAL
HBA1C MFR BLD: 7.5 % (ref 4.5–5.7)
Lab: ABNORMAL

## 2025-05-27 RX ORDER — DULOXETIN HYDROCHLORIDE 20 MG/1
20 CAPSULE, DELAYED RELEASE ORAL DAILY
Qty: 90 CAPSULE | Refills: 2 | Status: SHIPPED | OUTPATIENT
Start: 2025-05-27 | End: 2026-05-27
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
## 2025-05-30 NOTE — ASSESSMENT & PLAN NOTE
-Diabetes is slightly above goal with A1c 7.5.  -Discussed dietary and exercise guidelines with patient and family.  -Discussed the importance of yearly eye exams.  -Discussed the importance of checking BG's regularly.  Continue using CGM. 2 week data download is as follows:  Above goal: 38%  In Range: 62%  Below Goal: 0%  Trend shows regulated BG's with some mild post meal hypers as well.    -Continue with current insulin pump settings.  -Discussed the use of Glucagon at appropriate timing.  -S/S hypoglycemia reviewed with Rule of 15's advised.  -Follow-up in 3 months.

## (undated) DEVICE — KT VLV HEMO MAP ACC PLS LG/BORE MTL/INTRO W/TORQ/DEV

## (undated) DEVICE — GW INQWIRE FC PTFE STD J/1.5 .035 260

## (undated) DEVICE — ST INF PRI SMRTSTE 20DRP 2VLV 24ML 117

## (undated) DEVICE — PATIENT RETURN ELECTRODE, SINGLE-USE, CONTACT QUALITY MONITORING, ADULT, WITH 9FT CORD, FOR PATIENTS WEIGING OVER 33LBS. (15KG): Brand: MEGADYNE

## (undated) DEVICE — CATH F5 INF JL 4 100CM: Brand: INFINITI

## (undated) DEVICE — MYNXGRIP 5F: Brand: MYNXGRIP

## (undated) DEVICE — ANGIO-SEAL VIP VASCULAR CLOSURE DEVICE: Brand: ANGIO-SEAL

## (undated) DEVICE — GUIDE CATHETER: Brand: MACH1™

## (undated) DEVICE — PINNACLE INTRODUCER SHEATH: Brand: PINNACLE

## (undated) DEVICE — CATH F5 INF 3DRC 100CM: Brand: INFINITI

## (undated) DEVICE — ELECTRD NDL EZ CLN MOD 2.75IN

## (undated) DEVICE — DEV COMPR RADL PRELUDESYNCEZ 30ML 32CM

## (undated) DEVICE — GLIDESHEATH BASIC HYDROPHILIC COATED INTRODUCER SHEATH: Brand: GLIDESHEATH

## (undated) DEVICE — 360 - 360I 10"/10" CMSQ 8RA: Brand: MEDLINE

## (undated) DEVICE — MODEL AT P65, P/N 701554-001KIT CONTENTS: HAND CONTROLLER, 3-WAY HIGH-PRESSURE STOPCOCK WITH ROTATING END AND PREMIUM HIGH-PRESSURE TUBING: Brand: ANGIOTOUCH® KIT

## (undated) DEVICE — DEV INFL MONARCH 25W

## (undated) DEVICE — HI-TORQUE VERSATURN F GUIDE WIRE FULLY COATED .014 STRAIGHT TIP 190 CM: Brand: HI-TORQUE VERSATURN

## (undated) DEVICE — GW J/TP MOVE/CORE 0.035 3MM/TP 145CM

## (undated) DEVICE — PENCL ES MEGADINE EZ/CLEAN BUTN W/HOLSTR 10FT

## (undated) DEVICE — NDL PERC 1PRT THNWALL W/BASEPLT 18G 7CM

## (undated) DEVICE — GW PERIPH VASC ADX J/TP SS .035 150CM 3MM

## (undated) DEVICE — PK CATH CARD 10

## (undated) DEVICE — CATH DIAG EXPO M/ PK 6FR FL4/FR4 PIG 3PK

## (undated) DEVICE — PREMIUM WET SKIN PREP TRAY: Brand: MEDLINE INDUSTRIES, INC.

## (undated) DEVICE — MODEL BT2000 P/N 700287-012KIT CONTENTS: MANIFOLD WITH SALINE AND CONTRAST PORTS, SALINE TUBING WITH SPIKE AND HAND SYRINGE, TRANSDUCER: Brand: BT2000 AUTOMATED MANIFOLD KIT

## (undated) DEVICE — DRAPE,LAPAROTOMY,PED,STERILE: Brand: MEDLINE

## (undated) DEVICE — NC TREK NEO™ CORONARY DILATATION CATHETER 2.75 MM X 12 MM / RAPID-EXCHANGE: Brand: NC TREK NEO™

## (undated) DEVICE — DRSNG SURESITE WNDW 4X4.5

## (undated) DEVICE — TREK CORONARY DILATATION CATHETER 2.50 MM X 20 MM / RAPID-EXCHANGE: Brand: TREK

## (undated) DEVICE — BNDG GZ SOF-FORM CONFRM 2X75IN LF STRL

## (undated) DEVICE — MANIFLD NAMIC PRECEPTOR INTEGR/TRANSD RT/HND 1/PRT 500PSI

## (undated) DEVICE — SUT GUT CHRM 4/0 RB1 27IN U203H

## (undated) DEVICE — PAD, DEFIB, ADULT, RADIOTRANS, ZOLL: Brand: MEDLINE

## (undated) DEVICE — PK CATH CARD 70

## (undated) DEVICE — ADULT DISPOSABLE SINGLE-PATIENT USE PULSE OXIMETER SENSOR: Brand: NONIN

## (undated) DEVICE — HOLDER: Brand: DEROYAL

## (undated) DEVICE — SUT GUT CHRM 3/0 SH 27IN G122H

## (undated) DEVICE — PK BASIC 70

## (undated) DEVICE — NDL ANGIOGR ADV THN SMOTH SGLWALL 21G 1.5

## (undated) DEVICE — KT MINI ACC MAK COAXL 5F 10CM

## (undated) DEVICE — CATH F5 INF MP A2 100CM 2SH: Brand: INFINITI

## (undated) DEVICE — LN FLTR ORL/NASL MICROSTREAM NONINTUB A/LNG

## (undated) DEVICE — ST EXT IV SMRTSTE 2VLV FIX M LL 6ML 41

## (undated) DEVICE — GOWN,REINF,POLY,ECL,PP SLV,XXL: Brand: MEDLINE

## (undated) DEVICE — GLV SURG PREMIERPRO MIC LTX PF SZ8 BRN

## (undated) DEVICE — TREK CORONARY DILATATION CATHETER 3.0 MM X 15 MM / RAPID-EXCHANGE: Brand: TREK